# Patient Record
Sex: FEMALE | Race: WHITE | NOT HISPANIC OR LATINO
[De-identification: names, ages, dates, MRNs, and addresses within clinical notes are randomized per-mention and may not be internally consistent; named-entity substitution may affect disease eponyms.]

---

## 2017-02-22 ENCOUNTER — APPOINTMENT (OUTPATIENT)
Dept: ELECTROPHYSIOLOGY | Facility: CLINIC | Age: 82
End: 2017-02-22

## 2017-02-22 ENCOUNTER — NON-APPOINTMENT (OUTPATIENT)
Age: 82
End: 2017-02-22

## 2017-02-22 VITALS
WEIGHT: 120 LBS | SYSTOLIC BLOOD PRESSURE: 120 MMHG | HEIGHT: 61 IN | HEART RATE: 60 BPM | OXYGEN SATURATION: 96 % | DIASTOLIC BLOOD PRESSURE: 72 MMHG | BODY MASS INDEX: 22.66 KG/M2

## 2017-03-15 ENCOUNTER — APPOINTMENT (OUTPATIENT)
Dept: INTERNAL MEDICINE | Facility: CLINIC | Age: 82
End: 2017-03-15

## 2017-03-15 DIAGNOSIS — Z96.642 PRESENCE OF LEFT ARTIFICIAL HIP JOINT: ICD-10-CM

## 2017-04-04 ENCOUNTER — RX RENEWAL (OUTPATIENT)
Age: 82
End: 2017-04-04

## 2017-04-04 ENCOUNTER — TRANSCRIPTION ENCOUNTER (OUTPATIENT)
Age: 82
End: 2017-04-04

## 2017-04-28 ENCOUNTER — APPOINTMENT (OUTPATIENT)
Dept: INTERNAL MEDICINE | Facility: CLINIC | Age: 82
End: 2017-04-28

## 2017-04-28 VITALS — RESPIRATION RATE: 14 BRPM | HEART RATE: 68 BPM | SYSTOLIC BLOOD PRESSURE: 138 MMHG | DIASTOLIC BLOOD PRESSURE: 76 MMHG

## 2017-04-28 VITALS
HEART RATE: 63 BPM | WEIGHT: 124 LBS | BODY MASS INDEX: 23.41 KG/M2 | HEIGHT: 61 IN | DIASTOLIC BLOOD PRESSURE: 70 MMHG | SYSTOLIC BLOOD PRESSURE: 140 MMHG

## 2017-04-28 DIAGNOSIS — Z91.81 HISTORY OF FALLING: ICD-10-CM

## 2017-04-28 DIAGNOSIS — Z96.642 PRESENCE OF LEFT ARTIFICIAL HIP JOINT: ICD-10-CM

## 2017-04-28 LAB
ANION GAP SERPL CALC-SCNC: 16 MMOL/L
BUN SERPL-MCNC: 26 MG/DL
CALCIUM SERPL-MCNC: 9.8 MG/DL
CHLORIDE SERPL-SCNC: 104 MMOL/L
CO2 SERPL-SCNC: 24 MMOL/L
CREAT SERPL-MCNC: 0.99 MG/DL
GLUCOSE SERPL-MCNC: 89 MG/DL
POTASSIUM SERPL-SCNC: 4.8 MMOL/L
SODIUM SERPL-SCNC: 144 MMOL/L
T4 FREE SERPL-MCNC: 1.6 NG/DL
TSH SERPL-ACNC: 0.76 UIU/ML

## 2017-04-28 RX ORDER — CLINDAMYCIN HYDROCHLORIDE 300 MG/1
300 CAPSULE ORAL
Qty: 2 | Refills: 1 | Status: DISCONTINUED | COMMUNITY
Start: 2017-04-04 | End: 2017-04-28

## 2017-05-01 ENCOUNTER — MEDICATION RENEWAL (OUTPATIENT)
Age: 82
End: 2017-05-01

## 2017-05-01 LAB
CHOLEST SERPL-MCNC: 162 MG/DL
CHOLEST/HDLC SERPL: 3.8 RATIO
HDLC SERPL-MCNC: 43 MG/DL
LDLC SERPL CALC-MCNC: 73 MG/DL
TRIGL SERPL-MCNC: 231 MG/DL

## 2017-08-23 ENCOUNTER — NON-APPOINTMENT (OUTPATIENT)
Age: 82
End: 2017-08-23

## 2017-08-23 ENCOUNTER — APPOINTMENT (OUTPATIENT)
Dept: ELECTROPHYSIOLOGY | Facility: CLINIC | Age: 82
End: 2017-08-23
Payer: MEDICARE

## 2017-08-23 VITALS — DIASTOLIC BLOOD PRESSURE: 85 MMHG | SYSTOLIC BLOOD PRESSURE: 141 MMHG | HEART RATE: 58 BPM | OXYGEN SATURATION: 97 %

## 2017-08-23 PROCEDURE — 93000 ELECTROCARDIOGRAM COMPLETE: CPT

## 2017-08-23 PROCEDURE — 99214 OFFICE O/P EST MOD 30 MIN: CPT

## 2017-10-10 ENCOUNTER — TRANSCRIPTION ENCOUNTER (OUTPATIENT)
Age: 82
End: 2017-10-10

## 2017-10-10 ENCOUNTER — OTHER (OUTPATIENT)
Age: 82
End: 2017-10-10

## 2017-11-08 ENCOUNTER — APPOINTMENT (OUTPATIENT)
Dept: INTERNAL MEDICINE | Facility: CLINIC | Age: 82
End: 2017-11-08
Payer: MEDICARE

## 2017-11-08 VITALS
HEIGHT: 60.5 IN | WEIGHT: 124 LBS | BODY MASS INDEX: 23.72 KG/M2 | HEART RATE: 64 BPM | SYSTOLIC BLOOD PRESSURE: 140 MMHG | DIASTOLIC BLOOD PRESSURE: 60 MMHG

## 2017-11-08 PROCEDURE — 90688 IIV4 VACCINE SPLT 0.5 ML IM: CPT

## 2017-11-08 PROCEDURE — G0008: CPT

## 2017-11-08 PROCEDURE — 99214 OFFICE O/P EST MOD 30 MIN: CPT | Mod: 25

## 2018-02-28 ENCOUNTER — NON-APPOINTMENT (OUTPATIENT)
Age: 83
End: 2018-02-28

## 2018-02-28 ENCOUNTER — APPOINTMENT (OUTPATIENT)
Dept: ELECTROPHYSIOLOGY | Facility: CLINIC | Age: 83
End: 2018-02-28
Payer: MEDICARE

## 2018-02-28 VITALS
DIASTOLIC BLOOD PRESSURE: 83 MMHG | BODY MASS INDEX: 23.05 KG/M2 | OXYGEN SATURATION: 98 % | HEART RATE: 60 BPM | WEIGHT: 120 LBS | SYSTOLIC BLOOD PRESSURE: 156 MMHG

## 2018-02-28 PROCEDURE — 99214 OFFICE O/P EST MOD 30 MIN: CPT

## 2018-02-28 PROCEDURE — 93000 ELECTROCARDIOGRAM COMPLETE: CPT

## 2018-03-19 ENCOUNTER — TRANSCRIPTION ENCOUNTER (OUTPATIENT)
Age: 83
End: 2018-03-19

## 2018-04-18 ENCOUNTER — TRANSCRIPTION ENCOUNTER (OUTPATIENT)
Age: 83
End: 2018-04-18

## 2018-05-09 ENCOUNTER — APPOINTMENT (OUTPATIENT)
Dept: INTERNAL MEDICINE | Facility: CLINIC | Age: 83
End: 2018-05-09
Payer: MEDICARE

## 2018-05-09 VITALS
HEART RATE: 68 BPM | DIASTOLIC BLOOD PRESSURE: 70 MMHG | BODY MASS INDEX: 23.91 KG/M2 | SYSTOLIC BLOOD PRESSURE: 130 MMHG | WEIGHT: 125 LBS | HEIGHT: 60.5 IN

## 2018-05-09 PROCEDURE — 99214 OFFICE O/P EST MOD 30 MIN: CPT

## 2018-05-09 PROCEDURE — G0444 DEPRESSION SCREEN ANNUAL: CPT | Mod: 59

## 2018-05-10 LAB
ANION GAP SERPL CALC-SCNC: 13 MMOL/L
BUN SERPL-MCNC: 22 MG/DL
CALCIUM SERPL-MCNC: 9.7 MG/DL
CHLORIDE SERPL-SCNC: 105 MMOL/L
CHOLEST SERPL-MCNC: 158 MG/DL
CHOLEST/HDLC SERPL: 3.7 RATIO
CO2 SERPL-SCNC: 25 MMOL/L
CREAT SERPL-MCNC: 1.1 MG/DL
GLUCOSE SERPL-MCNC: 102 MG/DL
HDLC SERPL-MCNC: 43 MG/DL
LDLC SERPL CALC-MCNC: 72 MG/DL
POTASSIUM SERPL-SCNC: 5.3 MMOL/L
SODIUM SERPL-SCNC: 143 MMOL/L
TRIGL SERPL-MCNC: 216 MG/DL

## 2018-05-11 ENCOUNTER — MEDICATION RENEWAL (OUTPATIENT)
Age: 83
End: 2018-05-11

## 2018-05-11 LAB — TSH SERPL-ACNC: 0.77 UIU/ML

## 2018-05-14 ENCOUNTER — TRANSCRIPTION ENCOUNTER (OUTPATIENT)
Age: 83
End: 2018-05-14

## 2018-05-17 ENCOUNTER — APPOINTMENT (OUTPATIENT)
Dept: ALLERGY | Facility: CLINIC | Age: 83
End: 2018-05-17
Payer: MEDICARE

## 2018-05-17 VITALS
DIASTOLIC BLOOD PRESSURE: 80 MMHG | RESPIRATION RATE: 14 BRPM | HEIGHT: 61 IN | BODY MASS INDEX: 23.6 KG/M2 | SYSTOLIC BLOOD PRESSURE: 150 MMHG | HEART RATE: 64 BPM | WEIGHT: 125 LBS

## 2018-05-17 PROCEDURE — 99203 OFFICE O/P NEW LOW 30 MIN: CPT

## 2018-05-18 ENCOUNTER — TRANSCRIPTION ENCOUNTER (OUTPATIENT)
Age: 83
End: 2018-05-18

## 2018-06-13 ENCOUNTER — APPOINTMENT (OUTPATIENT)
Dept: ALLERGY | Facility: CLINIC | Age: 83
End: 2018-06-13
Payer: MEDICARE

## 2018-06-13 PROCEDURE — 95024 IQ TESTS W/ALLERGENIC XTRCS: CPT

## 2018-06-13 PROCEDURE — 95018 ALL TSTG PERQ&IQ DRUGS/BIOL: CPT

## 2018-06-14 ENCOUNTER — TRANSCRIPTION ENCOUNTER (OUTPATIENT)
Age: 83
End: 2018-06-14

## 2018-06-21 ENCOUNTER — APPOINTMENT (OUTPATIENT)
Dept: ALLERGY | Facility: CLINIC | Age: 83
End: 2018-06-21
Payer: MEDICARE

## 2018-06-21 PROCEDURE — 95076 INGEST CHALLENGE INI 120 MIN: CPT

## 2018-06-26 ENCOUNTER — TRANSCRIPTION ENCOUNTER (OUTPATIENT)
Age: 83
End: 2018-06-26

## 2018-06-28 ENCOUNTER — APPOINTMENT (OUTPATIENT)
Dept: ALLERGY | Facility: CLINIC | Age: 83
End: 2018-06-28
Payer: MEDICARE

## 2018-06-28 PROCEDURE — 95076 INGEST CHALLENGE INI 120 MIN: CPT

## 2018-06-28 PROCEDURE — 95018 ALL TSTG PERQ&IQ DRUGS/BIOL: CPT

## 2018-07-09 ENCOUNTER — TRANSCRIPTION ENCOUNTER (OUTPATIENT)
Age: 83
End: 2018-07-09

## 2018-08-29 ENCOUNTER — APPOINTMENT (OUTPATIENT)
Dept: ELECTROPHYSIOLOGY | Facility: CLINIC | Age: 83
End: 2018-08-29
Payer: MEDICARE

## 2018-08-29 ENCOUNTER — APPOINTMENT (OUTPATIENT)
Dept: ELECTROPHYSIOLOGY | Facility: CLINIC | Age: 83
End: 2018-08-29

## 2018-08-29 ENCOUNTER — NON-APPOINTMENT (OUTPATIENT)
Age: 83
End: 2018-08-29

## 2018-08-29 VITALS
SYSTOLIC BLOOD PRESSURE: 153 MMHG | HEIGHT: 61 IN | HEART RATE: 60 BPM | DIASTOLIC BLOOD PRESSURE: 73 MMHG | BODY MASS INDEX: 23.03 KG/M2 | WEIGHT: 122 LBS | OXYGEN SATURATION: 99 %

## 2018-08-29 PROCEDURE — 93000 ELECTROCARDIOGRAM COMPLETE: CPT

## 2018-08-29 PROCEDURE — 99214 OFFICE O/P EST MOD 30 MIN: CPT

## 2018-08-29 RX ORDER — CEFUROXIME SODIUM 750 MG/1
750 INJECTION, POWDER, FOR SOLUTION INTRAMUSCULAR; INTRAVENOUS
Qty: 1 | Refills: 0 | Status: DISCONTINUED | COMMUNITY
Start: 2018-05-31 | End: 2018-08-29

## 2018-08-29 RX ORDER — AMOXICILLIN 250 MG/5ML
250 POWDER, FOR SUSPENSION ORAL
Qty: 100 | Refills: 0 | Status: DISCONTINUED | COMMUNITY
Start: 2018-06-13 | End: 2018-08-29

## 2018-08-29 RX ORDER — CEFUROXIME SODIUM 1.5 G/16ML
1.5 INJECTION, POWDER, FOR SOLUTION INTRAVENOUS
Qty: 1 | Refills: 0 | Status: DISCONTINUED | COMMUNITY
Start: 2018-06-15 | End: 2018-08-29

## 2018-08-29 RX ORDER — CEPHALEXIN 250 MG/5ML
250 FOR SUSPENSION ORAL 4 TIMES DAILY
Qty: 1 | Refills: 0 | Status: DISCONTINUED | COMMUNITY
Start: 2018-06-25 | End: 2018-08-29

## 2018-10-10 ENCOUNTER — TRANSCRIPTION ENCOUNTER (OUTPATIENT)
Age: 83
End: 2018-10-10

## 2018-11-05 ENCOUNTER — TRANSCRIPTION ENCOUNTER (OUTPATIENT)
Age: 83
End: 2018-11-05

## 2018-11-14 ENCOUNTER — APPOINTMENT (OUTPATIENT)
Dept: INTERNAL MEDICINE | Facility: CLINIC | Age: 83
End: 2018-11-14
Payer: MEDICARE

## 2018-11-14 VITALS
HEART RATE: 68 BPM | BODY MASS INDEX: 23.41 KG/M2 | OXYGEN SATURATION: 98 % | SYSTOLIC BLOOD PRESSURE: 160 MMHG | HEIGHT: 61 IN | DIASTOLIC BLOOD PRESSURE: 70 MMHG | WEIGHT: 124 LBS

## 2018-11-14 VITALS — SYSTOLIC BLOOD PRESSURE: 148 MMHG | HEART RATE: 64 BPM | DIASTOLIC BLOOD PRESSURE: 72 MMHG | RESPIRATION RATE: 12 BRPM

## 2018-11-14 DIAGNOSIS — Z91.89 OTHER SPECIFIED PERSONAL RISK FACTORS, NOT ELSEWHERE CLASSIFIED: ICD-10-CM

## 2018-11-14 PROCEDURE — 90662 IIV NO PRSV INCREASED AG IM: CPT

## 2018-11-14 PROCEDURE — G0439: CPT

## 2018-11-14 PROCEDURE — G0008: CPT

## 2018-11-14 NOTE — PHYSICAL EXAM

## 2018-11-14 NOTE — ASSESSMENT
[FreeTextEntry1] : 1. HBP- Echo done in past- no LVH. Known white coat element- 24 hour ambu BP done April 2014- overall BP normal with only 14-17% systolic readings above 140 with highest 153. Repeated May 2016 with essentially optimal BP control- avg 118/65 with only 1/44 systolic readings above 140.. \par  \par  2. Hyperlipidemia- last LDL 72 in April 2017 on crestor 5- understands lack of data in age group but comfortable remaining on\par  \par  3. Hypothyroid- clin euthyroid- normal TSH in May 2018\par  \par  4. Arrhythmia- doing well on Sotalol under direction Dr. Naranjo-- f/u 6 mos- last 8/18- echo done last 11/15 and essentially normal\par  \par  5. h/o Ulcerative Colitis- rare occas exacerbations- colon last in Oct 2015 and neg- bx without dysplasia or significant inflammation- Dr Beltran- now with 6 mos of occasional blood- advised to f/u with GI- ? anosxopy or flex sig- to make sure bleeding is not related to colitis- sx somewhat atypical for hemorroids\par  \par  6. HCM- mammo 1/14- neg- pros and cons discussed- has decided to defer\par  colon 7/12, 10/15- neg\par  immun- ok- shingrix discussed\par  s/p hysterectomy\par  HC Proxy- - Vladimir and son Boris\par  Depression screening negative \par  \par  7. Osteoporosis- took alendronate from 6/98- 2/10=- On drug holiday- last DEXA 1/15 stable compared 2007/ 2010/2012- see notes- repeat at some point\par \par  8. Fall 9/16- first time- no injury- seemingly stumbled/misstepped- precautions advised and revised. ONe fall summer 2018- FALL PREVENTION REVIEWED AT LENGTH\par \par - f/u 6 mos or prn- flu shot and referral to see Dr Beltran

## 2018-11-14 NOTE — HEALTH RISK ASSESSMENT
[Very Good] : ~his/her~  mood as very good [One fall no injury in past year] : Patient reported one fall in the past year without injury [0] : 2) Feeling down, depressed, or hopeless: Not at all (0) [HIV test declined] : HIV test declined [Hepatitis C test declined] : Hepatitis C test declined [None] : None [With Family] : lives with family [Retired] : retired [] :  [# Of Children ___] : has [unfilled] children [Feels Safe at Home] : Feels safe at home [Fully functional (bathing, dressing, toileting, transferring, walking, feeding)] : Fully functional (bathing, dressing, toileting, transferring, walking, feeding) [Fully functional (using the telephone, shopping, preparing meals, housekeeping, doing laundry, using] : Fully functional and needs no help or supervision to perform IADLs (using the telephone, shopping, preparing meals, housekeeping, doing laundry, using transportation, managing medications and managing finances) [Smoke Detector] : smoke detector [Carbon Monoxide Detector] : carbon monoxide detector [Seat Belt] :  uses seat belt [Discussed at today's visit] : Advance Directives Discussed at today's visit [Designated Healthcare Proxy] : Designated healthcare proxy [FreeTextEntry1] : bleeding hemorrhoids [] : No [de-identified] : No ED or hosp [de-identified] : Cardiology; Aergy & I ; optho [de-identified] : fell summer 2018- tripped on uneven sidewalk- fell on grass [[id number]] : 0 [Change in mental status noted] : No change in mental status noted [Language] : denies difficulty with language [Behavior] : denies difficulty with behavior [Learning/Retaining New Information] : denies difficulty learning/retaining new information [Handling Complex Tasks] : denies difficulty handling complex tasks [Reasoning] : denies difficulty with reasoning [Spatial Ability and Orientation] : denies difficulty with spatial ability and orientation [Reports changes in hearing] : Reports no changes in hearing [Reports changes in vision] : Reports no changes in vision [Reports changes in dental health] : Reports no changes in dental health [de-identified] : glasses [FreeTextEntry4] : Vladimir comer

## 2018-11-14 NOTE — HISTORY OF PRESENT ILLNESS
[FreeTextEntry1] : 85 yo for scheduled annual wellness and f/u for hypertension, hypothyroid, drug allergies, cardiac arrhythmia [de-identified] : Last seen in May with communication since. Went ahead with drug allergy testing and underwent challenged with amoxacilllin  and cephalosporin without incident No longer considered Penicillin- allergy\par \par Patient has been generally well without any significant intercurrent issues or problems. Adherent with medications as noted without side effects. Appetite good and weight reportedly stable. Active with good exercise tolerance. No sx of chest pain, sob, palpitations, orthopnea, PND, HARDY, edema, lightheadedness..\par \par Exercises daily- calisthenics/stretching and walking Good tolerance. Feels some limitation of motion since hip surgery- i.e getting up from floor if goes down to mop floor NO pain

## 2018-11-15 ENCOUNTER — TRANSCRIPTION ENCOUNTER (OUTPATIENT)
Age: 83
End: 2018-11-15

## 2018-11-26 ENCOUNTER — APPOINTMENT (OUTPATIENT)
Dept: GASTROENTEROLOGY | Facility: CLINIC | Age: 83
End: 2018-11-26
Payer: MEDICARE

## 2018-11-26 VITALS
DIASTOLIC BLOOD PRESSURE: 78 MMHG | WEIGHT: 123 LBS | HEART RATE: 74 BPM | OXYGEN SATURATION: 98 % | SYSTOLIC BLOOD PRESSURE: 118 MMHG | BODY MASS INDEX: 23.22 KG/M2 | HEIGHT: 61 IN

## 2018-11-26 PROCEDURE — 99203 OFFICE O/P NEW LOW 30 MIN: CPT

## 2018-11-28 ENCOUNTER — TRANSCRIPTION ENCOUNTER (OUTPATIENT)
Age: 83
End: 2018-11-28

## 2019-01-04 ENCOUNTER — APPOINTMENT (OUTPATIENT)
Dept: ORTHOPEDIC SURGERY | Facility: CLINIC | Age: 84
End: 2019-01-04
Payer: MEDICARE

## 2019-01-04 VITALS
SYSTOLIC BLOOD PRESSURE: 125 MMHG | WEIGHT: 123 LBS | HEIGHT: 61 IN | HEART RATE: 61 BPM | BODY MASS INDEX: 23.22 KG/M2 | DIASTOLIC BLOOD PRESSURE: 78 MMHG

## 2019-01-04 PROCEDURE — 73521 X-RAY EXAM HIPS BI 2 VIEWS: CPT

## 2019-01-04 PROCEDURE — 99213 OFFICE O/P EST LOW 20 MIN: CPT

## 2019-01-04 PROCEDURE — 72100 X-RAY EXAM L-S SPINE 2/3 VWS: CPT

## 2019-01-08 ENCOUNTER — TRANSCRIPTION ENCOUNTER (OUTPATIENT)
Age: 84
End: 2019-01-08

## 2019-01-11 ENCOUNTER — TRANSCRIPTION ENCOUNTER (OUTPATIENT)
Age: 84
End: 2019-01-11

## 2019-02-13 ENCOUNTER — APPOINTMENT (OUTPATIENT)
Dept: ELECTROPHYSIOLOGY | Facility: CLINIC | Age: 84
End: 2019-02-13
Payer: MEDICARE

## 2019-02-13 ENCOUNTER — NON-APPOINTMENT (OUTPATIENT)
Age: 84
End: 2019-02-13

## 2019-02-13 VITALS
HEIGHT: 60 IN | OXYGEN SATURATION: 95 % | WEIGHT: 121 LBS | BODY MASS INDEX: 23.75 KG/M2 | DIASTOLIC BLOOD PRESSURE: 83 MMHG | SYSTOLIC BLOOD PRESSURE: 175 MMHG | HEART RATE: 62 BPM

## 2019-02-13 PROCEDURE — 99214 OFFICE O/P EST MOD 30 MIN: CPT | Mod: 25

## 2019-02-13 PROCEDURE — 93000 ELECTROCARDIOGRAM COMPLETE: CPT

## 2019-02-25 ENCOUNTER — TRANSCRIPTION ENCOUNTER (OUTPATIENT)
Age: 84
End: 2019-02-25

## 2019-02-27 ENCOUNTER — APPOINTMENT (OUTPATIENT)
Dept: ELECTROPHYSIOLOGY | Facility: CLINIC | Age: 84
End: 2019-02-27

## 2019-03-05 ENCOUNTER — TRANSCRIPTION ENCOUNTER (OUTPATIENT)
Age: 84
End: 2019-03-05

## 2019-03-24 NOTE — HISTORY OF PRESENT ILLNESS
[FreeTextEntry1] : 83 yo woman with prior history of SVT/AT maintained on Sotalol 40 mg bid. She has history of HTN - on Lorsartan. History of Hypothyroidism on Levoxyl and HLD - Rosuvastatin.\par She is feeling well and has no new symptoms.  \par Denies palpitations, SOB, chest pain, dizziness, lightheadedness, syncope or presyncope.  No bleeding problems ( h/o hemorroids)\par Prior echocardiogram showed preserved left ventricular function.  Prior nuclear stress test 2010 did not show any ischemia. She has not had any ischemic symptoms.

## 2019-03-24 NOTE — DISCUSSION/SUMMARY
[FreeTextEntry1] : No SVT recurrence.\par She is on sotalol and QT interval nl for rate. Recommend follow up electrolytes and  renal function\par Bradycardia: baseline. No progression. \par  HTN - Elevated BPs on current dosage of Lorsartan. Followup eval.  \par  Followup with electrophysiology in 6 months.

## 2019-03-24 NOTE — REVIEW OF SYSTEMS
[Negative] : Heme/Lymph [Feeling Fatigued] : not feeling fatigued [Shortness Of Breath] : no shortness of breath [Cough] : no cough [Joint Pain] : no joint pain [Dizziness] : no dizziness

## 2019-03-24 NOTE — REASON FOR VISIT
[Palpitations] : palpitations [Supraventricular Tachycardia] : supraventricular tachycardia [Spouse] : spouse [FreeTextEntry2] : follow up

## 2019-03-24 NOTE — PHYSICAL EXAM
[General Appearance - Well Developed] : well developed [General Appearance - In No Acute Distress] : no acute distress [Normal Conjunctiva] : the conjunctiva exhibited no abnormalities [No Oral Pallor] : no oral pallor [No Oral Cyanosis] : no oral cyanosis [Respiration, Rhythm And Depth] : normal respiratory rhythm and effort [Auscultation Breath Sounds / Voice Sounds] : lungs were clear to auscultation bilaterally [Abdomen Soft] : soft [Abdomen Tenderness] : non-tender [Abnormal Walk] : normal gait [Nail Clubbing] : no clubbing of the fingernails [Cyanosis, Localized] : no localized cyanosis [] : no rash [No Venous Stasis] : no venous stasis [Impaired Insight] : insight and judgment were intact [5th Left ICS - MCL] : palpated at the 5th LICS in the midclavicular line [Bradycardia] : bradycardic [Rhythm Regular] : regular [Normal S1] : normal S1 [Normal S2] : normal S2 [2+] : left 2+ [1+] : left 1+ [No Abnormalities] : the abdominal aorta was not enlarged and no bruit was heard [No Pitting Edema] : no pitting edema present [FreeTextEntry1] : no JVD or carotid bruit [S3] : no S3 [S4] : no S4

## 2019-04-25 ENCOUNTER — NON-APPOINTMENT (OUTPATIENT)
Age: 84
End: 2019-04-25

## 2019-04-25 ENCOUNTER — TRANSCRIPTION ENCOUNTER (OUTPATIENT)
Age: 84
End: 2019-04-25

## 2019-04-25 ENCOUNTER — APPOINTMENT (OUTPATIENT)
Dept: INTERNAL MEDICINE | Facility: CLINIC | Age: 84
End: 2019-04-25
Payer: MEDICARE

## 2019-04-25 VITALS
BODY MASS INDEX: 25.13 KG/M2 | WEIGHT: 128 LBS | HEIGHT: 60 IN | DIASTOLIC BLOOD PRESSURE: 70 MMHG | HEART RATE: 66 BPM | SYSTOLIC BLOOD PRESSURE: 170 MMHG | OXYGEN SATURATION: 97 %

## 2019-04-25 VITALS — DIASTOLIC BLOOD PRESSURE: 80 MMHG | SYSTOLIC BLOOD PRESSURE: 170 MMHG

## 2019-04-25 DIAGNOSIS — Z00.00 ENCOUNTER FOR GENERAL ADULT MEDICAL EXAMINATION W/OUT ABNORMAL FINDINGS: ICD-10-CM

## 2019-04-25 PROCEDURE — 99215 OFFICE O/P EST HI 40 MIN: CPT

## 2019-04-26 LAB
25(OH)D3 SERPL-MCNC: 50 NG/ML
ALBUMIN SERPL ELPH-MCNC: 4.5 G/DL
ALP BLD-CCNC: 60 U/L
ALT SERPL-CCNC: 19 U/L
AMYLASE/CREAT SERPL: 174 U/L
ANION GAP SERPL CALC-SCNC: 15 MMOL/L
AST SERPL-CCNC: 27 U/L
BACTERIA UR CULT: NORMAL
BASOPHILS # BLD AUTO: 0.03 K/UL
BASOPHILS NFR BLD AUTO: 0.6 %
BILIRUB SERPL-MCNC: 0.6 MG/DL
BILIRUB UR QL STRIP: NORMAL
BUN SERPL-MCNC: 21 MG/DL
CALCIUM SERPL-MCNC: 9.4 MG/DL
CHLORIDE SERPL-SCNC: 106 MMOL/L
CHOLEST SERPL-MCNC: 173 MG/DL
CHOLEST/HDLC SERPL: 3.7 RATIO
CLARITY UR: CLEAR
CO2 SERPL-SCNC: 21 MMOL/L
COLLECTION METHOD: NORMAL
CREAT SERPL-MCNC: 0.83 MG/DL
EOSINOPHIL # BLD AUTO: 0.09 K/UL
EOSINOPHIL NFR BLD AUTO: 1.7 %
ESTIMATED AVERAGE GLUCOSE: 117 MG/DL
GLUCOSE SERPL-MCNC: 78 MG/DL
GLUCOSE UR-MCNC: NORMAL
HBA1C MFR BLD HPLC: 5.7 %
HCG UR QL: 0.2 EU/DL
HCT VFR BLD CALC: 41.8 %
HDLC SERPL-MCNC: 47 MG/DL
HGB BLD-MCNC: 13.1 G/DL
HGB UR QL STRIP.AUTO: NORMAL
IMM GRANULOCYTES NFR BLD AUTO: 0.2 %
KETONES UR-MCNC: NORMAL
LDLC SERPL CALC-MCNC: 95 MG/DL
LEUKOCYTE ESTERASE UR QL STRIP: NORMAL
LPL SERPL-CCNC: 46 U/L
LYMPHOCYTES # BLD AUTO: 2.1 K/UL
LYMPHOCYTES NFR BLD AUTO: 40.8 %
MAN DIFF?: NORMAL
MCHC RBC-ENTMCNC: 28.8 PG
MCHC RBC-ENTMCNC: 31.3 GM/DL
MCV RBC AUTO: 91.9 FL
MONOCYTES # BLD AUTO: 0.45 K/UL
MONOCYTES NFR BLD AUTO: 8.7 %
NEUTROPHILS # BLD AUTO: 2.47 K/UL
NEUTROPHILS NFR BLD AUTO: 48 %
NITRITE UR QL STRIP: NORMAL
PH UR STRIP: 7
PLATELET # BLD AUTO: 232 K/UL
POTASSIUM SERPL-SCNC: 4.5 MMOL/L
PROT SERPL-MCNC: 7.2 G/DL
PROT UR STRIP-MCNC: NORMAL
RBC # BLD: 4.55 M/UL
RBC # FLD: 14.3 %
SODIUM SERPL-SCNC: 142 MMOL/L
SP GR UR STRIP: 1.01
TRIGL SERPL-MCNC: 155 MG/DL
TSH SERPL-ACNC: 1.48 UIU/ML
VIT B12 SERPL-MCNC: >2000 PG/ML
WBC # FLD AUTO: 5.15 K/UL

## 2019-04-26 NOTE — REVIEW OF SYSTEMS
[Vision Problems] : vision problems [Hearing Loss] : hearing loss [Sore Throat] : sore throat [Negative] : Psychiatric [Chills] : no chills [Fever] : no fever [Nasal Discharge] : no nasal discharge [Night Sweats] : no night sweats

## 2019-04-26 NOTE — PHYSICAL EXAM
[No Acute Distress] : no acute distress [Well Developed] : well developed [Well Nourished] : well nourished [Well-Appearing] : well-appearing [Normal Sclera/Conjunctiva] : normal sclera/conjunctiva [EOMI] : extraocular movements intact [Normal Outer Ear/Nose] : the outer ears and nose were normal in appearance [PERRL] : pupils equal round and reactive to light [Normal Oropharynx] : the oropharynx was normal [No JVD] : no jugular venous distention [Supple] : supple [No Respiratory Distress] : no respiratory distress  [Thyroid Normal, No Nodules] : the thyroid was normal and there were no nodules present [No Lymphadenopathy] : no lymphadenopathy [No Accessory Muscle Use] : no accessory muscle use [Clear to Auscultation] : lungs were clear to auscultation bilaterally [Normal Rate] : normal rate  [Regular Rhythm] : with a regular rhythm [Normal S1, S2] : normal S1 and S2 [No Murmur] : no murmur heard [No Carotid Bruits] : no carotid bruits [No Abdominal Bruit] : a ~M bruit was not heard ~T in the abdomen [No Varicosities] : no varicosities [Pedal Pulses Present] : the pedal pulses are present [No Edema] : there was no peripheral edema [No Extremity Clubbing/Cyanosis] : no extremity clubbing/cyanosis [No Palpable Aorta] : no palpable aorta [Soft] : abdomen soft [Non Tender] : non-tender [Non-distended] : non-distended [No Masses] : no abdominal mass palpated [No HSM] : no HSM [Normal Posterior Cervical Nodes] : no posterior cervical lymphadenopathy [Normal Bowel Sounds] : normal bowel sounds [Normal Anterior Cervical Nodes] : no anterior cervical lymphadenopathy [No CVA Tenderness] : no CVA  tenderness [No Joint Swelling] : no joint swelling [No Spinal Tenderness] : no spinal tenderness [Grossly Normal Strength/Tone] : grossly normal strength/tone [No Rash] : no rash [Normal Gait] : normal gait [Coordination Grossly Intact] : coordination grossly intact [Deep Tendon Reflexes (DTR)] : deep tendon reflexes were 2+ and symmetric [No Focal Deficits] : no focal deficits [Normal Affect] : the affect was normal [Normal Insight/Judgement] : insight and judgment were intact [Speech Grossly Normal] : speech grossly normal [de-identified] : conjunctiva pink [de-identified] : no bruit [de-identified] : Romberg negative, no pronator drift, finger to nose pointing- normal

## 2019-04-26 NOTE — HISTORY OF PRESENT ILLNESS
[FreeTextEntry8] : Seen today after patient called me yesterday night when on call. She is 85 yo female with hx SVT, controlled on Sotalol, followed by Dr Naarnjo. Had experienced two episodes of feeling "dizzy and not herself" in the past 24 hours, first episode was brief in the morning, only few minutes, second episode occurring in evening, after completing her one hour floor routine which she did without any problems, lasting for few hours, improved  by this morning but not yet herself. She thought she might have SVT and questioning need to increase Sotalol.  She denies chest pain, palpitations, dyspnea, headache, change of vision or speech or sensation of room spinning. She had no problems with her speech, weakness, or gait. She denies any URI symptoms, N/V or diarrhea, but did have on the prior evening,  episode of intense  abdominal cramps in suprapubic area, lasting few hours, and  but no associated diarrhea, constipation. Every day, she has 2 coffees, 1 tea, and 1 glass of water.\par She lives with her 90 yo  who is able to drive her.

## 2019-05-22 ENCOUNTER — APPOINTMENT (OUTPATIENT)
Dept: INTERNAL MEDICINE | Facility: CLINIC | Age: 84
End: 2019-05-22
Payer: MEDICARE

## 2019-05-22 VITALS — DIASTOLIC BLOOD PRESSURE: 70 MMHG | RESPIRATION RATE: 14 BRPM | SYSTOLIC BLOOD PRESSURE: 160 MMHG | HEART RATE: 70 BPM

## 2019-05-22 VITALS — BODY MASS INDEX: 25.13 KG/M2 | HEIGHT: 60 IN | WEIGHT: 128 LBS

## 2019-05-22 DIAGNOSIS — R10.9 UNSPECIFIED ABDOMINAL PAIN: ICD-10-CM

## 2019-05-22 DIAGNOSIS — K64.9 UNSPECIFIED HEMORRHOIDS: ICD-10-CM

## 2019-05-22 DIAGNOSIS — Z87.898 PERSONAL HISTORY OF OTHER SPECIFIED CONDITIONS: ICD-10-CM

## 2019-05-22 DIAGNOSIS — Z88.1 ALLERGY STATUS TO OTHER ANTIBIOTIC AGENTS: ICD-10-CM

## 2019-05-22 DIAGNOSIS — Z87.39 PERSONAL HISTORY OF OTHER DISEASES OF THE MUSCULOSKELETAL SYSTEM AND CONNECTIVE TISSUE: ICD-10-CM

## 2019-05-22 DIAGNOSIS — M25.551 PAIN IN RIGHT HIP: ICD-10-CM

## 2019-05-22 PROCEDURE — 99214 OFFICE O/P EST MOD 30 MIN: CPT

## 2019-05-22 NOTE — ASSESSMENT
[FreeTextEntry1] : 1. HBP- Echo done in past- no LVH. Known white coat element- 24 hour ambu BP done April 2014- overall BP normal with only 14-17% systolic readings above 140 with highest 153. Repeated May 2016 with essentially optimal BP control- avg 118/65 with only 1/44 systolic readings above 140.. BP elevated to 170 when seen last month and not feeling well. Dr Mark increased dose of losartan to 50 mg twice daily\par \par BP ok today, but could be white coat- and component of pseudohypertension- options reviewed- could do another 24 hr ambu bp- recently piurchased home cuff- will try to verify accuracy with MD son and then check BP  several times a week- to report on readings at some point- unless all high\par  \par  2. Hyperlipidemia- last LDL 95 in April 2019 on crestor 5- understands lack of data in age group but comfortable remaining on\par  \par  3. Hypothyroid- clin euthyroid- normal TSH in April 2019\par  \par  4. Arrhythmia- doing well on Sotalol under direction Dr. Naranjo-- f/u 6 mos- last 2/19- echo done last 11/15 and essentially normal\par  \par  5. h/o Ulcerative Colitis- rare occas exacerbations- colon last in Oct 2015 and neg- bx without dysplasia or significant inflammation- Dr Beltran-  with 6 mos of occasional blood reported 11/18- Saw Dr Beltran- felt to be hemorrhoidal\par  \par  6. HCM- mammo 1/14- neg- pros and cons discussed- has decided to defer\par  colon 7/12, 10/15- neg\par  immun- ok- shingrix discussed and encouraged\par  s/p hysterectomy\par  HC Proxy- - Vladimir and son Boris\par  Depression screening negative \par  \par  7. Osteoporosis- took alendronate from 6/98- 2/10=- On drug holiday- last DEXA 1/15 stable compared 2007/ 2010/2012- see notes- repeat at some point\par \par 8. Episode of dysequilibrium- April 2019- unclear etioilgy- resolved and eval neg- ? viral syndrone\par \par LAbs in APril\par

## 2019-05-22 NOTE — HEALTH RISK ASSESSMENT
[No falls in past year] : Patient reported no falls in the past year [0] : 2) Feeling down, depressed, or hopeless: Not at all (0) [] : No [de-identified] : No ED or Hosp [de-identified] : EP, Ortho, GI, Optho [de-identified] : ,  [de-identified] : Rare social [de-identified] : Extremely active around house and specific exercises [de-identified] : Healthy diet- tries to avoid fats Fruits and vegetables [[id number]] : 0

## 2019-05-22 NOTE — HISTORY OF PRESENT ILLNESS
[FreeTextEntry1] : 84+ yo for scheduled f/u for history of SVT, IBD, thyroid disease,  and routine care [de-identified] : Last seen by me in Nov. Saw Dr Varghese- hemorrhoids- bleeding- better. ROutine f/u with EP and Ortho. Had acute illness about one month ago- Reports an imbalance when walking- had similar sx when had arrhtymia- see notes. Exam and labs were all fine. Sx resolved completely. Only had sx when walking- in am and then resolved. No other neuro sx NO assoc nausea.\par \par Patient has been generally well since. Adherent with medications as noted without side effects. Appetite good and weight reportedly stable. Active with good exercise tolerance. No sx of chest pain, sob, palpitations, orthopnea, PND, HARDY, edema, lightheadedness..\par \par \par Walking and stretching, isometrics- course of PT and doing exercises at home

## 2019-08-19 ENCOUNTER — TRANSCRIPTION ENCOUNTER (OUTPATIENT)
Age: 84
End: 2019-08-19

## 2019-08-28 ENCOUNTER — APPOINTMENT (OUTPATIENT)
Dept: ELECTROPHYSIOLOGY | Facility: CLINIC | Age: 84
End: 2019-08-28
Payer: MEDICARE

## 2019-08-28 ENCOUNTER — NON-APPOINTMENT (OUTPATIENT)
Age: 84
End: 2019-08-28

## 2019-08-28 VITALS
OXYGEN SATURATION: 97 % | DIASTOLIC BLOOD PRESSURE: 88 MMHG | SYSTOLIC BLOOD PRESSURE: 149 MMHG | HEIGHT: 60 IN | HEART RATE: 63 BPM

## 2019-08-28 PROCEDURE — 99214 OFFICE O/P EST MOD 30 MIN: CPT

## 2019-08-28 PROCEDURE — 93000 ELECTROCARDIOGRAM COMPLETE: CPT

## 2019-08-28 NOTE — PHYSICAL EXAM
[General Appearance - Well Developed] : well developed [Normal Conjunctiva] : the conjunctiva exhibited no abnormalities [General Appearance - In No Acute Distress] : no acute distress [No Oral Pallor] : no oral pallor [No Oral Cyanosis] : no oral cyanosis [Respiration, Rhythm And Depth] : normal respiratory rhythm and effort [Auscultation Breath Sounds / Voice Sounds] : lungs were clear to auscultation bilaterally [Abdomen Soft] : soft [Abdomen Tenderness] : non-tender [Abnormal Walk] : normal gait [Nail Clubbing] : no clubbing of the fingernails [Cyanosis, Localized] : no localized cyanosis [] : no rash [No Venous Stasis] : no venous stasis [Impaired Insight] : insight and judgment were intact [5th Left ICS - MCL] : palpated at the 5th LICS in the midclavicular line [Bradycardia] : bradycardic [Rhythm Regular] : regular [Normal S1] : normal S1 [Normal S2] : normal S2 [2+] : left 2+ [1+] : left 1+ [No Abnormalities] : the abdominal aorta was not enlarged and no bruit was heard [No Pitting Edema] : no pitting edema present [FreeTextEntry1] : no JVD or carotid bruit [S3] : no S3 [S4] : no S4

## 2019-08-28 NOTE — HISTORY OF PRESENT ILLNESS
[FreeTextEntry1] : 84 yo woman with prior history of SVT/AT maintained on Sotalol 40 mg bid. She has history of HTN - on Lorsartan. History of Hypothyroidism on Levoxyl and HLD - Rosuvastatin.\par No new symptoms.  \par Denies palpitations, SOB, chest pain, dizziness, lightheadedness, syncope or presyncope.  No bleeding problems ( h/o hemorroids)\par Prior echocardiogram 2015showed preserved left ventricular function.  Prior nuclear stress test 2010 did not show any ischemia. She has not had any ischemic symptoms.

## 2019-08-28 NOTE — DISCUSSION/SUMMARY
[FreeTextEntry1] : She is doing well and no SVT recurrence.\par She is on sotalol and QT interval nl for rate. Recommend follow up electrolytes and  renal function\par Bradycardia: baseline. No progression. \par  Followup with electrophysiology in 6 months.

## 2019-10-29 ENCOUNTER — TRANSCRIPTION ENCOUNTER (OUTPATIENT)
Age: 84
End: 2019-10-29

## 2019-10-30 ENCOUNTER — APPOINTMENT (OUTPATIENT)
Dept: GASTROENTEROLOGY | Facility: CLINIC | Age: 84
End: 2019-10-30
Payer: MEDICARE

## 2019-10-30 PROCEDURE — 99214 OFFICE O/P EST MOD 30 MIN: CPT

## 2019-10-30 NOTE — PHYSICAL EXAM
[General Appearance - Alert] : alert [General Appearance - In No Acute Distress] : in no acute distress [Outer Ear] : the ears and nose were normal in appearance [Auscultation Breath Sounds / Voice Sounds] : lungs were clear to auscultation bilaterally [Heart Rate And Rhythm] : heart rate was normal and rhythm regular [Heart Sounds] : normal S1 and S2 [Heart Sounds Gallop] : no gallops [Murmurs] : no murmurs [Heart Sounds Pericardial Friction Rub] : no pericardial rub [Edema] : there was no peripheral edema [Bowel Sounds] : normal bowel sounds [Abdomen Soft] : soft [Abdomen Tenderness] : non-tender [] : no hepato-splenomegaly [Abdomen Mass (___ Cm)] : no abdominal mass palpated [Normal Sphincter Tone] : normal sphincter tone [No Rectal Mass] : no rectal mass [External Hemorrhoid] : external hemorrhoids [FreeTextEntry1] : brown stool in vault, no blood [Skin Color & Pigmentation] : normal skin color and pigmentation

## 2019-10-30 NOTE — HISTORY OF PRESENT ILLNESS
[FreeTextEntry1] : Yazmin Presents for a followup visit. She relates a few weeks of lower, cramping followed by diarrheal stools and gas and bloating. Sometimes she has incontinence. Sometimes she sees small amount of dark red blood. No fevers or chills. No weight loss. She admits to having dairy products.

## 2019-10-30 NOTE — ASSESSMENT
[FreeTextEntry1] : This is an 85-year-old female with ulcerative colitis reporting chronic lower abdominal cramping, fecal incontinence and diarrhea. Diarrhea occurs possibly 4 times a week consisting of several stools a day when she developed cramping. I recommend stool studies to rule out infectious process. I recommend fecal calprotection and lactoferrin.Recommend blood work including CBC, sedimentation rate and C-reactive protein. I recommend to a trial on a lactose-free diet. If the stool studies and blood work are unremarkable and she continues to have cramping with diarrhea after 2 weeks on lactose-free diet, she will need to undergo a colonoscopy. At this time she is reluctant to do this.Questions were answered. She stated understanding.

## 2019-10-30 NOTE — REASON FOR VISIT
[Follow-Up: _____] : a [unfilled] follow-up visit [FreeTextEntry1] : lower abdominal cramps, diarrhea, bloating

## 2019-10-31 ENCOUNTER — TRANSCRIPTION ENCOUNTER (OUTPATIENT)
Age: 84
End: 2019-10-31

## 2019-10-31 LAB
ALBUMIN SERPL ELPH-MCNC: 4.4 G/DL
ALP BLD-CCNC: 56 U/L
ALT SERPL-CCNC: 18 U/L
ANION GAP SERPL CALC-SCNC: 13 MMOL/L
AST SERPL-CCNC: 23 U/L
BASOPHILS # BLD AUTO: 0.04 K/UL
BASOPHILS NFR BLD AUTO: 0.7 %
BILIRUB SERPL-MCNC: 0.3 MG/DL
BUN SERPL-MCNC: 25 MG/DL
CALCIUM SERPL-MCNC: 9.4 MG/DL
CHLORIDE SERPL-SCNC: 107 MMOL/L
CO2 SERPL-SCNC: 23 MMOL/L
CREAT SERPL-MCNC: 0.9 MG/DL
CRP SERPL-MCNC: 0.57 MG/DL
EOSINOPHIL # BLD AUTO: 0.13 K/UL
EOSINOPHIL NFR BLD AUTO: 2.2 %
ERYTHROCYTE [SEDIMENTATION RATE] IN BLOOD BY WESTERGREN METHOD: 25 MM/HR
GLUCOSE SERPL-MCNC: 110 MG/DL
HCT VFR BLD CALC: 38 %
HGB BLD-MCNC: 11.9 G/DL
IMM GRANULOCYTES NFR BLD AUTO: 0.3 %
LYMPHOCYTES # BLD AUTO: 2.01 K/UL
LYMPHOCYTES NFR BLD AUTO: 34.7 %
MAN DIFF?: NORMAL
MCHC RBC-ENTMCNC: 29 PG
MCHC RBC-ENTMCNC: 31.3 GM/DL
MCV RBC AUTO: 92.7 FL
MONOCYTES # BLD AUTO: 0.66 K/UL
MONOCYTES NFR BLD AUTO: 11.4 %
NEUTROPHILS # BLD AUTO: 2.94 K/UL
NEUTROPHILS NFR BLD AUTO: 50.7 %
PLATELET # BLD AUTO: 273 K/UL
POTASSIUM SERPL-SCNC: 4.6 MMOL/L
PROT SERPL-MCNC: 6.7 G/DL
RBC # BLD: 4.1 M/UL
RBC # FLD: 14.6 %
SODIUM SERPL-SCNC: 143 MMOL/L
WBC # FLD AUTO: 5.8 K/UL

## 2019-11-01 LAB
C DIFF TOX GENS STL QL NAA+PROBE: NORMAL
CDIFF BY PCR: NOT DETECTED
GI PCR PANEL, STOOL: NORMAL

## 2019-11-05 LAB — CALPROTECTIN FECAL: 171 UG/G

## 2019-11-07 LAB — LACTOFERRIN STL-MCNC: 44.8

## 2019-11-12 ENCOUNTER — TRANSCRIPTION ENCOUNTER (OUTPATIENT)
Age: 84
End: 2019-11-12

## 2019-11-17 ENCOUNTER — MOBILE ON CALL (OUTPATIENT)
Age: 84
End: 2019-11-17

## 2019-11-18 ENCOUNTER — MESSAGE (OUTPATIENT)
Age: 84
End: 2019-11-18

## 2019-12-04 ENCOUNTER — APPOINTMENT (OUTPATIENT)
Dept: INTERNAL MEDICINE | Facility: CLINIC | Age: 84
End: 2019-12-04
Payer: MEDICARE

## 2019-12-04 VITALS
BODY MASS INDEX: 23.95 KG/M2 | OXYGEN SATURATION: 97 % | DIASTOLIC BLOOD PRESSURE: 70 MMHG | HEART RATE: 62 BPM | HEIGHT: 60 IN | WEIGHT: 122 LBS | SYSTOLIC BLOOD PRESSURE: 150 MMHG

## 2019-12-04 DIAGNOSIS — E03.9 HYPOTHYROIDISM, UNSPECIFIED: ICD-10-CM

## 2019-12-04 DIAGNOSIS — R10.30 LOWER ABDOMINAL PAIN, UNSPECIFIED: ICD-10-CM

## 2019-12-04 DIAGNOSIS — M47.816 SPONDYLOSIS W/OUT MYELOPATHY OR RADICULOPATHY, LUMBAR REGION: ICD-10-CM

## 2019-12-04 DIAGNOSIS — M81.0 AGE-RELATED OSTEOPOROSIS W/OUT CURRENT PATHOLOGICAL FRACTURE: ICD-10-CM

## 2019-12-04 DIAGNOSIS — E78.5 HYPERLIPIDEMIA, UNSPECIFIED: ICD-10-CM

## 2019-12-04 PROCEDURE — G0439: CPT

## 2019-12-04 PROCEDURE — G0444 DEPRESSION SCREEN ANNUAL: CPT | Mod: 59

## 2019-12-04 RX ORDER — BUDESONIDE 9 MG/1
9 TABLET, EXTENDED RELEASE ORAL
Qty: 30 | Refills: 2 | Status: DISCONTINUED | COMMUNITY
Start: 2019-11-18 | End: 2019-12-04

## 2019-12-04 RX ORDER — MESALAMINE 1.2 G/1
1.2 TABLET, DELAYED RELEASE ORAL
Qty: 120 | Refills: 5 | Status: DISCONTINUED | COMMUNITY
Start: 2019-11-07 | End: 2019-12-04

## 2019-12-04 NOTE — ASSESSMENT
[FreeTextEntry1] : 1. HBP- Echo done in past- no LVH. Known white coat element- 24 hour ambu BP done April 2014- overall BP normal with only 14-17% systolic readings above 140 with highest 153. Repeated May 2016 with essentially optimal BP control- avg 118/65 with only 1/44 systolic readings above 140.\par \par Home cuff- readings 24- 4 above 140 in range 140-150- leave on current. Brought in home cuff and assessed as accurate with office reading\par  \par  2. Hyperlipidemia- last LDL 95 in April 2019 on crestor 5- understands lack of data in age group but comfortable remaining on med\par  \par  3. Hypothyroid- clin euthyroid- normal TSH in April 2019- renewed today\par  \par  4. Arrhythmia- doing well on Sotalol under direction Dr. Naranjo-- f/u 6 mos- last 8/19- echo done last 11/15 and essentially normal- plans f/u 2/20\par  \par  5. h/o Ulcerative Colitis- rare occas exacerbations- colon last in Oct 2015 and neg- bx without dysplasia or significant inflammation- Dr Beltran- acute  sx 11/19 and started on oral meds- see notes- doing better- plan is to leave on meds for  to treat for 3 mos- til mid-feb and then reassess\par  \par  6. HCM- mammo 1/14- neg- pros and cons discussed- has decided to defer\par  colon 7/12, 10/15- neg\par  immun- ok- shingrix and flu shot this season completed\par  s/p hysterectomy\par  HC Proxy- - Vladimir and son Boris\par  Depression screening negative \par  \par  7. Osteoporosis- took alendronate from 6/98- 2/10=- On drug holiday- last DEXA 1/15 stable compared 2007/ 2010/2012- see notes- repeat at some point\par \par 8. Episode of dysequilibrium- April 2019- unclear etioilgy- resolved and eval neg- ? viral syndrone- not recurred\par \par Labs in [facility]- Dr Beltran- see no need to repeat- f/u 6 mos or prn\par

## 2019-12-04 NOTE — PHYSICAL EXAM
[No Acute Distress] : no acute distress [Well Nourished] : well nourished [Well Developed] : well developed [PERRL] : pupils equal round and reactive to light [Well-Appearing] : well-appearing [Normal Sclera/Conjunctiva] : normal sclera/conjunctiva [Normal Outer Ear/Nose] : the outer ears and nose were normal in appearance [EOMI] : extraocular movements intact [Normal Oropharynx] : the oropharynx was normal [Supple] : supple [No JVD] : no jugular venous distention [No Lymphadenopathy] : no lymphadenopathy [No Respiratory Distress] : no respiratory distress  [Thyroid Normal, No Nodules] : the thyroid was normal and there were no nodules present [No Accessory Muscle Use] : no accessory muscle use [Clear to Auscultation] : lungs were clear to auscultation bilaterally [Regular Rhythm] : with a regular rhythm [Normal Rate] : normal rate  [Normal S1, S2] : normal S1 and S2 [No Murmur] : no murmur heard [No Abdominal Bruit] : a ~M bruit was not heard ~T in the abdomen [No Carotid Bruits] : no carotid bruits [Pedal Pulses Present] : the pedal pulses are present [No Varicosities] : no varicosities [No Edema] : there was no peripheral edema [No Palpable Aorta] : no palpable aorta [No Extremity Clubbing/Cyanosis] : no extremity clubbing/cyanosis [Soft] : abdomen soft [Non Tender] : non-tender [Non-distended] : non-distended [No Masses] : no abdominal mass palpated [No HSM] : no HSM [Normal Bowel Sounds] : normal bowel sounds [Normal Posterior Cervical Nodes] : no posterior cervical lymphadenopathy [Normal Anterior Cervical Nodes] : no anterior cervical lymphadenopathy [No CVA Tenderness] : no CVA  tenderness [No Spinal Tenderness] : no spinal tenderness [No Joint Swelling] : no joint swelling [No Rash] : no rash [Grossly Normal Strength/Tone] : grossly normal strength/tone [Normal Gait] : normal gait [Coordination Grossly Intact] : coordination grossly intact [No Focal Deficits] : no focal deficits [Deep Tendon Reflexes (DTR)] : deep tendon reflexes were 2+ and symmetric [Normal Affect] : the affect was normal [Normal Insight/Judgement] : insight and judgment were intact [de-identified] : OA chnages of hands- DIP and PIP [de-identified] : TN's partially occluded by cerumen

## 2019-12-04 NOTE — COUNSELING
[Fall prevention counseling provided] : Fall prevention counseling provided [No throw rugs] : No throw rugs [Adequate lighting] : Adequate lighting [Use proper foot wear] : Use proper foot wear [Good understanding] : Patient has a good understanding of lifestyle changes and steps needed to achieve self management goal [None] : None

## 2019-12-04 NOTE — HEALTH RISK ASSESSMENT
[Fair] : ~his/her~ current health as fair  [Very Good] : ~his/her~  mood as very good [No] : No [No falls in past year] : Patient reported no falls in the past year [0] : 1) Little interest or pleasure doing things: Not at all (0) [1] : 2) Feeling down, depressed, or hopeless for several days (1) [HIV test declined] : HIV test declined [Hepatitis C test declined] : Hepatitis C test declined [None] : None [With Family] : lives with family [Retired] : retired [] :  [# Of Children ___] : has [unfilled] children [Feels Safe at Home] : Feels safe at home [Fully functional (bathing, dressing, toileting, transferring, walking, feeding)] : Fully functional (bathing, dressing, toileting, transferring, walking, feeding) [Fully functional (using the telephone, shopping, preparing meals, housekeeping, doing laundry, using] : Fully functional and needs no help or supervision to perform IADLs (using the telephone, shopping, preparing meals, housekeeping, doing laundry, using transportation, managing medications and managing finances) [Reports normal functional visual acuity (ie: able to read med bottle)] : Reports normal functional visual acuity [Smoke Detector] : smoke detector [Seat Belt] :  uses seat belt [Designated Healthcare Proxy] : Designated healthcare proxy [Name: ___] : Health Care Proxy's Name: [unfilled]  [I will adhere to the patient's wishes as expressed in the advance directive except as noted below.] : I will adhere to the patient's wishes as expressed in the advance directive except as noted below [FreeTextEntry1] : ulcerative colitis flair- see HPI [] : No [de-identified] : No hosp or ED or urgicenter [de-identified] : GI; derm- skin check; optho [de-identified] : Exercises at home- walks 2- 21/2 miles daily [Audit-CScore] : 0 [[id number]] : 1 [de-identified] : lactose free diet- somewhat in "flux" as some foods cause sx- self aware [Change in mental status noted] : No change in mental status noted [Language] : denies difficulty with language [Learning/Retaining New Information] : denies difficulty learning/retaining new information [Behavior] : denies difficulty with behavior [Reasoning] : denies difficulty with reasoning [Handling Complex Tasks] : denies difficulty handling complex tasks [Spatial Ability and Orientation] : denies difficulty with spatial ability and orientation [Reports changes in hearing] : Reports no changes in hearing [Reports changes in vision] : Reports no changes in vision [Reports changes in dental health] : Reports no changes in dental health [de-identified] : glasses [de-identified] : Drives without issues [AdvancecareDate] : 12/19

## 2019-12-04 NOTE — HISTORY OF PRESENT ILLNESS
[de-identified] : Last seen in May with contact since- see notes. Had flare of IBD and treated by Dr Beltran with oral budesonide and mesalamine- see GI notes- took mesalamine for one week- diarrhea- felt related- stopped- started budesonide- 3 caps daily- started two weeks ago- sx are improved.PLan is to treat for three months\par \par \par  Adherent with medications as noted without side effects. Appetite good and weight reportedly stable. Active with good exercise tolerance. No sx of chest pain, sob, palpitations, orthopnea, PND, HARDY, edema, lightheadedness..\par

## 2019-12-05 ENCOUNTER — TRANSCRIPTION ENCOUNTER (OUTPATIENT)
Age: 84
End: 2019-12-05

## 2019-12-16 ENCOUNTER — TRANSCRIPTION ENCOUNTER (OUTPATIENT)
Age: 84
End: 2019-12-16

## 2019-12-17 ENCOUNTER — TRANSCRIPTION ENCOUNTER (OUTPATIENT)
Age: 84
End: 2019-12-17

## 2019-12-20 ENCOUNTER — TRANSCRIPTION ENCOUNTER (OUTPATIENT)
Age: 84
End: 2019-12-20

## 2020-01-01 ENCOUNTER — TRANSCRIPTION ENCOUNTER (OUTPATIENT)
Age: 85
End: 2020-01-01

## 2020-01-01 ENCOUNTER — APPOINTMENT (OUTPATIENT)
Dept: INTERNAL MEDICINE | Facility: CLINIC | Age: 85
End: 2020-01-01
Payer: MEDICARE

## 2020-01-01 ENCOUNTER — MED ADMIN CHARGE (OUTPATIENT)
Age: 85
End: 2020-01-01

## 2020-01-01 ENCOUNTER — NON-APPOINTMENT (OUTPATIENT)
Age: 85
End: 2020-01-01

## 2020-01-01 ENCOUNTER — APPOINTMENT (OUTPATIENT)
Dept: GASTROENTEROLOGY | Facility: CLINIC | Age: 85
End: 2020-01-01
Payer: MEDICARE

## 2020-01-01 ENCOUNTER — RESULT REVIEW (OUTPATIENT)
Age: 85
End: 2020-01-01

## 2020-01-01 ENCOUNTER — INPATIENT (INPATIENT)
Facility: HOSPITAL | Age: 85
LOS: 8 days | Discharge: HOME | DRG: 872 | End: 2020-08-11
Attending: HOSPITALIST | Admitting: STUDENT IN AN ORGANIZED HEALTH CARE EDUCATION/TRAINING PROGRAM
Payer: MEDICARE

## 2020-01-01 ENCOUNTER — INPATIENT (INPATIENT)
Facility: HOSPITAL | Age: 85
LOS: 4 days | Discharge: HOME | DRG: 386 | End: 2020-05-20
Attending: HOSPITALIST | Admitting: STUDENT IN AN ORGANIZED HEALTH CARE EDUCATION/TRAINING PROGRAM
Payer: MEDICARE

## 2020-01-01 ENCOUNTER — APPOINTMENT (OUTPATIENT)
Dept: INTERNAL MEDICINE | Facility: CLINIC | Age: 85
End: 2020-01-01

## 2020-01-01 ENCOUNTER — APPOINTMENT (OUTPATIENT)
Dept: INFECTIOUS DISEASE | Facility: CLINIC | Age: 85
End: 2020-01-01
Payer: MEDICARE

## 2020-01-01 ENCOUNTER — APPOINTMENT (OUTPATIENT)
Dept: ELECTROPHYSIOLOGY | Facility: CLINIC | Age: 85
End: 2020-01-01

## 2020-01-01 VITALS
DIASTOLIC BLOOD PRESSURE: 72 MMHG | SYSTOLIC BLOOD PRESSURE: 125 MMHG | HEART RATE: 84 BPM | TEMPERATURE: 98 F | OXYGEN SATURATION: 97 % | RESPIRATION RATE: 18 BRPM

## 2020-01-01 VITALS
OXYGEN SATURATION: 95 % | HEART RATE: 65 BPM | DIASTOLIC BLOOD PRESSURE: 74 MMHG | SYSTOLIC BLOOD PRESSURE: 120 MMHG | RESPIRATION RATE: 18 BRPM | TEMPERATURE: 99 F

## 2020-01-01 VITALS
DIASTOLIC BLOOD PRESSURE: 59 MMHG | HEIGHT: 60 IN | HEART RATE: 51 BPM | TEMPERATURE: 98 F | RESPIRATION RATE: 19 BRPM | OXYGEN SATURATION: 99 % | SYSTOLIC BLOOD PRESSURE: 144 MMHG | WEIGHT: 115.08 LBS

## 2020-01-01 VITALS
HEIGHT: 60 IN | DIASTOLIC BLOOD PRESSURE: 69 MMHG | OXYGEN SATURATION: 96 % | RESPIRATION RATE: 22 BRPM | WEIGHT: 111.99 LBS | TEMPERATURE: 102 F | SYSTOLIC BLOOD PRESSURE: 130 MMHG | HEART RATE: 92 BPM

## 2020-01-01 DIAGNOSIS — Z02.9 ENCOUNTER FOR ADMINISTRATIVE EXAMINATIONS, UNSPECIFIED: ICD-10-CM

## 2020-01-01 DIAGNOSIS — I10 ESSENTIAL (PRIMARY) HYPERTENSION: ICD-10-CM

## 2020-01-01 DIAGNOSIS — Z98.89 OTHER SPECIFIED POSTPROCEDURAL STATES: Chronic | ICD-10-CM

## 2020-01-01 DIAGNOSIS — Z86.19 PERSONAL HISTORY OF OTHER INFECTIOUS AND PARASITIC DISEASES: ICD-10-CM

## 2020-01-01 DIAGNOSIS — K51.90 ULCERATIVE COLITIS, UNSPECIFIED, WITHOUT COMPLICATIONS: ICD-10-CM

## 2020-01-01 DIAGNOSIS — E03.9 HYPOTHYROIDISM, UNSPECIFIED: ICD-10-CM

## 2020-01-01 DIAGNOSIS — I47.1 SUPRAVENTRICULAR TACHYCARDIA: ICD-10-CM

## 2020-01-01 DIAGNOSIS — C18.9 MALIGNANT NEOPLASM OF COLON, UNSPECIFIED: ICD-10-CM

## 2020-01-01 DIAGNOSIS — A04.72 ENTEROCOLITIS DUE TO CLOSTRIDIUM DIFFICILE, NOT SPECIFIED AS RECURRENT: ICD-10-CM

## 2020-01-01 DIAGNOSIS — K51.90 ULCERATIVE COLITIS, UNSPECIFIED, W/OUT COMPLICATIONS: ICD-10-CM

## 2020-01-01 DIAGNOSIS — Z87.19 PERSONAL HISTORY OF OTHER DISEASES OF THE DIGESTIVE SYSTEM: ICD-10-CM

## 2020-01-01 DIAGNOSIS — M25.559 PAIN IN UNSPECIFIED HIP: ICD-10-CM

## 2020-01-01 DIAGNOSIS — Z23 ENCOUNTER FOR IMMUNIZATION: ICD-10-CM

## 2020-01-01 DIAGNOSIS — Z41.9 ENCOUNTER FOR PROCEDURE FOR PURPOSES OTHER THAN REMEDYING HEALTH STATE, UNSPECIFIED: Chronic | ICD-10-CM

## 2020-01-01 DIAGNOSIS — Z79.899 OTHER LONG TERM (CURRENT) DRUG THERAPY: ICD-10-CM

## 2020-01-01 DIAGNOSIS — E78.00 PURE HYPERCHOLESTEROLEMIA, UNSPECIFIED: ICD-10-CM

## 2020-01-01 DIAGNOSIS — A41.9 SEPSIS, UNSPECIFIED ORGANISM: ICD-10-CM

## 2020-01-01 DIAGNOSIS — K64.4 RESIDUAL HEMORRHOIDAL SKIN TAGS: ICD-10-CM

## 2020-01-01 DIAGNOSIS — Z96.642 PRESENCE OF LEFT ARTIFICIAL HIP JOINT: ICD-10-CM

## 2020-01-01 DIAGNOSIS — Z29.9 ENCOUNTER FOR PROPHYLACTIC MEASURES, UNSPECIFIED: ICD-10-CM

## 2020-01-01 DIAGNOSIS — E87.2 ACIDOSIS: ICD-10-CM

## 2020-01-01 DIAGNOSIS — Z93.3 COLOSTOMY STATUS: ICD-10-CM

## 2020-01-01 LAB
ALBUMIN SERPL ELPH-MCNC: 3 G/DL — LOW (ref 3.3–5)
ALBUMIN SERPL ELPH-MCNC: 3.1 G/DL — LOW (ref 3.3–5)
ALBUMIN SERPL ELPH-MCNC: 3.2 G/DL — LOW (ref 3.3–5)
ALBUMIN SERPL ELPH-MCNC: 3.2 G/DL — LOW (ref 3.3–5)
ALBUMIN SERPL ELPH-MCNC: 3.3 G/DL — SIGNIFICANT CHANGE UP (ref 3.3–5)
ALBUMIN SERPL ELPH-MCNC: 3.8 G/DL — SIGNIFICANT CHANGE UP (ref 3.3–5)
ALBUMIN SERPL ELPH-MCNC: 4.1 G/DL — SIGNIFICANT CHANGE UP (ref 3.3–5)
ALP SERPL-CCNC: 40 U/L — SIGNIFICANT CHANGE UP (ref 40–120)
ALP SERPL-CCNC: 41 U/L — SIGNIFICANT CHANGE UP (ref 40–120)
ALP SERPL-CCNC: 44 U/L — SIGNIFICANT CHANGE UP (ref 40–120)
ALP SERPL-CCNC: 45 U/L — SIGNIFICANT CHANGE UP (ref 40–120)
ALP SERPL-CCNC: 48 U/L — SIGNIFICANT CHANGE UP (ref 40–120)
ALP SERPL-CCNC: 53 U/L — SIGNIFICANT CHANGE UP (ref 40–120)
ALP SERPL-CCNC: 53 U/L — SIGNIFICANT CHANGE UP (ref 40–120)
ALP SERPL-CCNC: 54 U/L — SIGNIFICANT CHANGE UP (ref 40–120)
ALP SERPL-CCNC: 62 U/L — SIGNIFICANT CHANGE UP (ref 40–120)
ALT FLD-CCNC: 10 U/L — SIGNIFICANT CHANGE UP (ref 10–45)
ALT FLD-CCNC: 11 U/L — SIGNIFICANT CHANGE UP (ref 10–45)
ALT FLD-CCNC: 12 U/L — SIGNIFICANT CHANGE UP (ref 10–45)
ALT FLD-CCNC: 12 U/L — SIGNIFICANT CHANGE UP (ref 10–45)
ALT FLD-CCNC: 15 U/L — SIGNIFICANT CHANGE UP (ref 10–45)
ALT FLD-CCNC: 9 U/L — LOW (ref 10–45)
ALT FLD-CCNC: 9 U/L — LOW (ref 10–45)
ANION GAP SERPL CALC-SCNC: 13 MMOL/L — SIGNIFICANT CHANGE UP (ref 5–17)
ANION GAP SERPL CALC-SCNC: 14 MMOL/L — SIGNIFICANT CHANGE UP (ref 5–17)
ANION GAP SERPL CALC-SCNC: 14 MMOL/L — SIGNIFICANT CHANGE UP (ref 5–17)
ANION GAP SERPL CALC-SCNC: 15 MMOL/L — SIGNIFICANT CHANGE UP (ref 5–17)
ANION GAP SERPL CALC-SCNC: 16 MMOL/L — SIGNIFICANT CHANGE UP (ref 5–17)
ANION GAP SERPL CALC-SCNC: 17 MMOL/L — SIGNIFICANT CHANGE UP (ref 5–17)
ANION GAP SERPL CALC-SCNC: 18 MMOL/L — HIGH (ref 5–17)
ANISOCYTOSIS BLD QL: SLIGHT — SIGNIFICANT CHANGE UP
APPEARANCE UR: CLEAR — SIGNIFICANT CHANGE UP
APPEARANCE UR: CLEAR — SIGNIFICANT CHANGE UP
APTT BLD: 25.9 SEC — LOW (ref 27.5–36.3)
APTT BLD: 27.8 SEC — SIGNIFICANT CHANGE UP (ref 27.5–35.5)
AST SERPL-CCNC: 15 U/L — SIGNIFICANT CHANGE UP (ref 10–40)
AST SERPL-CCNC: 16 U/L — SIGNIFICANT CHANGE UP (ref 10–40)
AST SERPL-CCNC: 17 U/L — SIGNIFICANT CHANGE UP (ref 10–40)
AST SERPL-CCNC: 18 U/L — SIGNIFICANT CHANGE UP (ref 10–40)
AST SERPL-CCNC: 19 U/L — SIGNIFICANT CHANGE UP (ref 10–40)
AST SERPL-CCNC: 24 U/L — SIGNIFICANT CHANGE UP (ref 10–40)
AST SERPL-CCNC: 25 U/L — SIGNIFICANT CHANGE UP (ref 10–40)
BACTERIA # UR AUTO: NEGATIVE — SIGNIFICANT CHANGE UP
BACTERIA # UR AUTO: NEGATIVE — SIGNIFICANT CHANGE UP
BASE EXCESS BLDV CALC-SCNC: 1.2 MMOL/L — SIGNIFICANT CHANGE UP (ref -2–2)
BASOPHILS # BLD AUTO: 0 K/UL — SIGNIFICANT CHANGE UP (ref 0–0.2)
BASOPHILS # BLD AUTO: 0 K/UL — SIGNIFICANT CHANGE UP (ref 0–0.2)
BASOPHILS # BLD AUTO: 0.03 K/UL — SIGNIFICANT CHANGE UP (ref 0–0.2)
BASOPHILS # BLD AUTO: 0.03 K/UL — SIGNIFICANT CHANGE UP (ref 0–0.2)
BASOPHILS # BLD AUTO: 0.06 K/UL — SIGNIFICANT CHANGE UP (ref 0–0.2)
BASOPHILS # BLD AUTO: 0.07 K/UL — SIGNIFICANT CHANGE UP (ref 0–0.2)
BASOPHILS # BLD AUTO: 0.07 K/UL — SIGNIFICANT CHANGE UP (ref 0–0.2)
BASOPHILS # BLD AUTO: 0.08 K/UL — SIGNIFICANT CHANGE UP (ref 0–0.2)
BASOPHILS NFR BLD AUTO: 0 % — SIGNIFICANT CHANGE UP (ref 0–2)
BASOPHILS NFR BLD AUTO: 0 % — SIGNIFICANT CHANGE UP (ref 0–2)
BASOPHILS NFR BLD AUTO: 0.2 % — SIGNIFICANT CHANGE UP (ref 0–2)
BASOPHILS NFR BLD AUTO: 0.3 % — SIGNIFICANT CHANGE UP (ref 0–2)
BASOPHILS NFR BLD AUTO: 0.3 % — SIGNIFICANT CHANGE UP (ref 0–2)
BASOPHILS NFR BLD AUTO: 0.4 % — SIGNIFICANT CHANGE UP (ref 0–2)
BASOPHILS NFR BLD AUTO: 0.5 % — SIGNIFICANT CHANGE UP (ref 0–2)
BASOPHILS NFR BLD AUTO: 0.6 % — SIGNIFICANT CHANGE UP (ref 0–2)
BILIRUB SERPL-MCNC: 0.1 MG/DL — LOW (ref 0.2–1.2)
BILIRUB SERPL-MCNC: 0.2 MG/DL — SIGNIFICANT CHANGE UP (ref 0.2–1.2)
BILIRUB SERPL-MCNC: 0.2 MG/DL — SIGNIFICANT CHANGE UP (ref 0.2–1.2)
BILIRUB SERPL-MCNC: 0.4 MG/DL — SIGNIFICANT CHANGE UP (ref 0.2–1.2)
BILIRUB SERPL-MCNC: 0.5 MG/DL — SIGNIFICANT CHANGE UP (ref 0.2–1.2)
BILIRUB SERPL-MCNC: 0.6 MG/DL — SIGNIFICANT CHANGE UP (ref 0.2–1.2)
BILIRUB SERPL-MCNC: 0.6 MG/DL — SIGNIFICANT CHANGE UP (ref 0.2–1.2)
BILIRUB UR-MCNC: NEGATIVE — SIGNIFICANT CHANGE UP
BILIRUB UR-MCNC: NEGATIVE — SIGNIFICANT CHANGE UP
BLD GP AB SCN SERPL QL: NEGATIVE — SIGNIFICANT CHANGE UP
BLD GP AB SCN SERPL QL: NEGATIVE — SIGNIFICANT CHANGE UP
BUN SERPL-MCNC: 10 MG/DL — SIGNIFICANT CHANGE UP (ref 7–23)
BUN SERPL-MCNC: 11 MG/DL — SIGNIFICANT CHANGE UP (ref 7–23)
BUN SERPL-MCNC: 12 MG/DL — SIGNIFICANT CHANGE UP (ref 7–23)
BUN SERPL-MCNC: 14 MG/DL — SIGNIFICANT CHANGE UP (ref 7–23)
BUN SERPL-MCNC: 14 MG/DL — SIGNIFICANT CHANGE UP (ref 7–23)
BUN SERPL-MCNC: 15 MG/DL — SIGNIFICANT CHANGE UP (ref 7–23)
BUN SERPL-MCNC: 16 MG/DL — SIGNIFICANT CHANGE UP (ref 7–23)
BUN SERPL-MCNC: 19 MG/DL — SIGNIFICANT CHANGE UP (ref 7–23)
BUN SERPL-MCNC: 21 MG/DL — SIGNIFICANT CHANGE UP (ref 7–23)
BUN SERPL-MCNC: 27 MG/DL — HIGH (ref 7–23)
BUN SERPL-MCNC: 8 MG/DL — SIGNIFICANT CHANGE UP (ref 7–23)
BUN SERPL-MCNC: 9 MG/DL — SIGNIFICANT CHANGE UP (ref 7–23)
C DIFF GDH STL QL: POSITIVE — SIGNIFICANT CHANGE UP
C DIFF GDH STL QL: SIGNIFICANT CHANGE UP
C DIFF TOX GENS STL QL NAA+PROBE: NORMAL
CA-I SERPL-SCNC: 1.15 MMOL/L — SIGNIFICANT CHANGE UP (ref 1.12–1.3)
CALCIUM SERPL-MCNC: 7.7 MG/DL — LOW (ref 8.4–10.5)
CALCIUM SERPL-MCNC: 7.7 MG/DL — LOW (ref 8.4–10.5)
CALCIUM SERPL-MCNC: 7.9 MG/DL — LOW (ref 8.4–10.5)
CALCIUM SERPL-MCNC: 7.9 MG/DL — LOW (ref 8.4–10.5)
CALCIUM SERPL-MCNC: 8 MG/DL — LOW (ref 8.4–10.5)
CALCIUM SERPL-MCNC: 8.1 MG/DL — LOW (ref 8.4–10.5)
CALCIUM SERPL-MCNC: 8.4 MG/DL — SIGNIFICANT CHANGE UP (ref 8.4–10.5)
CALCIUM SERPL-MCNC: 8.5 MG/DL — SIGNIFICANT CHANGE UP (ref 8.4–10.5)
CALCIUM SERPL-MCNC: 8.6 MG/DL — SIGNIFICANT CHANGE UP (ref 8.4–10.5)
CALCIUM SERPL-MCNC: 8.6 MG/DL — SIGNIFICANT CHANGE UP (ref 8.4–10.5)
CALCIUM SERPL-MCNC: 8.7 MG/DL — SIGNIFICANT CHANGE UP (ref 8.4–10.5)
CALCIUM SERPL-MCNC: 8.7 MG/DL — SIGNIFICANT CHANGE UP (ref 8.4–10.5)
CALCIUM SERPL-MCNC: 8.8 MG/DL — SIGNIFICANT CHANGE UP (ref 8.4–10.5)
CALCIUM SERPL-MCNC: 9 MG/DL — SIGNIFICANT CHANGE UP (ref 8.4–10.5)
CALCIUM SERPL-MCNC: 9 MG/DL — SIGNIFICANT CHANGE UP (ref 8.4–10.5)
CALCIUM SERPL-MCNC: 9.1 MG/DL — SIGNIFICANT CHANGE UP (ref 8.4–10.5)
CALPROTECTIN STL-MCNT: 278 UG/G — HIGH (ref 0–120)
CDIFF BY PCR: DETECTED
CEA SERPL-MCNC: 116 NG/ML — HIGH (ref 0–3.8)
CHLORIDE BLDV-SCNC: 112 MMOL/L — HIGH (ref 96–108)
CHLORIDE SERPL-SCNC: 101 MMOL/L — SIGNIFICANT CHANGE UP (ref 96–108)
CHLORIDE SERPL-SCNC: 102 MMOL/L — SIGNIFICANT CHANGE UP (ref 96–108)
CHLORIDE SERPL-SCNC: 103 MMOL/L — SIGNIFICANT CHANGE UP (ref 96–108)
CHLORIDE SERPL-SCNC: 104 MMOL/L — SIGNIFICANT CHANGE UP (ref 96–108)
CHLORIDE SERPL-SCNC: 104 MMOL/L — SIGNIFICANT CHANGE UP (ref 96–108)
CHLORIDE SERPL-SCNC: 105 MMOL/L — SIGNIFICANT CHANGE UP (ref 96–108)
CHLORIDE SERPL-SCNC: 105 MMOL/L — SIGNIFICANT CHANGE UP (ref 96–108)
CHLORIDE SERPL-SCNC: 106 MMOL/L — SIGNIFICANT CHANGE UP (ref 96–108)
CHLORIDE SERPL-SCNC: 107 MMOL/L — SIGNIFICANT CHANGE UP (ref 96–108)
CHLORIDE SERPL-SCNC: 107 MMOL/L — SIGNIFICANT CHANGE UP (ref 96–108)
CHLORIDE SERPL-SCNC: 108 MMOL/L — SIGNIFICANT CHANGE UP (ref 96–108)
CHLORIDE SERPL-SCNC: 108 MMOL/L — SIGNIFICANT CHANGE UP (ref 96–108)
CO2 BLDV-SCNC: 27 MMOL/L — SIGNIFICANT CHANGE UP (ref 22–30)
CO2 SERPL-SCNC: 16 MMOL/L — LOW (ref 22–31)
CO2 SERPL-SCNC: 16 MMOL/L — LOW (ref 22–31)
CO2 SERPL-SCNC: 17 MMOL/L — LOW (ref 22–31)
CO2 SERPL-SCNC: 19 MMOL/L — LOW (ref 22–31)
CO2 SERPL-SCNC: 20 MMOL/L — LOW (ref 22–31)
CO2 SERPL-SCNC: 20 MMOL/L — LOW (ref 22–31)
CO2 SERPL-SCNC: 21 MMOL/L — LOW (ref 22–31)
CO2 SERPL-SCNC: 21 MMOL/L — LOW (ref 22–31)
CO2 SERPL-SCNC: 22 MMOL/L — SIGNIFICANT CHANGE UP (ref 22–31)
CO2 SERPL-SCNC: 23 MMOL/L — SIGNIFICANT CHANGE UP (ref 22–31)
CO2 SERPL-SCNC: 24 MMOL/L — SIGNIFICANT CHANGE UP (ref 22–31)
CO2 SERPL-SCNC: 24 MMOL/L — SIGNIFICANT CHANGE UP (ref 22–31)
CO2 SERPL-SCNC: 25 MMOL/L — SIGNIFICANT CHANGE UP (ref 22–31)
CO2 SERPL-SCNC: 25 MMOL/L — SIGNIFICANT CHANGE UP (ref 22–31)
COLOR SPEC: YELLOW — SIGNIFICANT CHANGE UP
COLOR SPEC: YELLOW — SIGNIFICANT CHANGE UP
CREAT SERPL-MCNC: 0.63 MG/DL — SIGNIFICANT CHANGE UP (ref 0.5–1.3)
CREAT SERPL-MCNC: 0.65 MG/DL — SIGNIFICANT CHANGE UP (ref 0.5–1.3)
CREAT SERPL-MCNC: 0.67 MG/DL — SIGNIFICANT CHANGE UP (ref 0.5–1.3)
CREAT SERPL-MCNC: 0.71 MG/DL — SIGNIFICANT CHANGE UP (ref 0.5–1.3)
CREAT SERPL-MCNC: 0.71 MG/DL — SIGNIFICANT CHANGE UP (ref 0.5–1.3)
CREAT SERPL-MCNC: 0.72 MG/DL — SIGNIFICANT CHANGE UP (ref 0.5–1.3)
CREAT SERPL-MCNC: 0.72 MG/DL — SIGNIFICANT CHANGE UP (ref 0.5–1.3)
CREAT SERPL-MCNC: 0.73 MG/DL — SIGNIFICANT CHANGE UP (ref 0.5–1.3)
CREAT SERPL-MCNC: 0.8 MG/DL — SIGNIFICANT CHANGE UP (ref 0.5–1.3)
CREAT SERPL-MCNC: 0.83 MG/DL — SIGNIFICANT CHANGE UP (ref 0.5–1.3)
CREAT SERPL-MCNC: 0.88 MG/DL — SIGNIFICANT CHANGE UP (ref 0.5–1.3)
CREAT SERPL-MCNC: 0.88 MG/DL — SIGNIFICANT CHANGE UP (ref 0.5–1.3)
CREAT SERPL-MCNC: 0.9 MG/DL — SIGNIFICANT CHANGE UP (ref 0.5–1.3)
CREAT SERPL-MCNC: 0.98 MG/DL — SIGNIFICANT CHANGE UP (ref 0.5–1.3)
CRP SERPL-MCNC: 5.86 MG/DL — HIGH (ref 0–0.4)
CULTURE RESULTS: NO GROWTH — SIGNIFICANT CHANGE UP
CULTURE RESULTS: SIGNIFICANT CHANGE UP
DIFF PNL FLD: ABNORMAL
DIFF PNL FLD: NEGATIVE — SIGNIFICANT CHANGE UP
EOSINOPHIL # BLD AUTO: 0 K/UL — SIGNIFICANT CHANGE UP (ref 0–0.5)
EOSINOPHIL # BLD AUTO: 0 K/UL — SIGNIFICANT CHANGE UP (ref 0–0.5)
EOSINOPHIL # BLD AUTO: 0.02 K/UL — SIGNIFICANT CHANGE UP (ref 0–0.5)
EOSINOPHIL # BLD AUTO: 0.03 K/UL — SIGNIFICANT CHANGE UP (ref 0–0.5)
EOSINOPHIL # BLD AUTO: 0.07 K/UL — SIGNIFICANT CHANGE UP (ref 0–0.5)
EOSINOPHIL # BLD AUTO: 0.09 K/UL — SIGNIFICANT CHANGE UP (ref 0–0.5)
EOSINOPHIL # BLD AUTO: 0.1 K/UL — SIGNIFICANT CHANGE UP (ref 0–0.5)
EOSINOPHIL # BLD AUTO: 0.17 K/UL — SIGNIFICANT CHANGE UP (ref 0–0.5)
EOSINOPHIL NFR BLD AUTO: 0 % — SIGNIFICANT CHANGE UP (ref 0–6)
EOSINOPHIL NFR BLD AUTO: 0 % — SIGNIFICANT CHANGE UP (ref 0–6)
EOSINOPHIL NFR BLD AUTO: 0.1 % — SIGNIFICANT CHANGE UP (ref 0–6)
EOSINOPHIL NFR BLD AUTO: 0.3 % — SIGNIFICANT CHANGE UP (ref 0–6)
EOSINOPHIL NFR BLD AUTO: 0.4 % — SIGNIFICANT CHANGE UP (ref 0–6)
EOSINOPHIL NFR BLD AUTO: 0.5 % — SIGNIFICANT CHANGE UP (ref 0–6)
EOSINOPHIL NFR BLD AUTO: 0.7 % — SIGNIFICANT CHANGE UP (ref 0–6)
EOSINOPHIL NFR BLD AUTO: 1.4 % — SIGNIFICANT CHANGE UP (ref 0–6)
EPI CELLS # UR: 3 — SIGNIFICANT CHANGE UP
EPI CELLS # UR: 5 /HPF — SIGNIFICANT CHANGE UP
ERYTHROCYTE [SEDIMENTATION RATE] IN BLOOD: 12 MM/HR — SIGNIFICANT CHANGE UP (ref 0–20)
GAMMA INTERFERON BACKGROUND BLD IA-ACNC: 0.02 IU/ML — SIGNIFICANT CHANGE UP
GAS PNL BLDV: 139 MMOL/L — SIGNIFICANT CHANGE UP (ref 135–145)
GAS PNL BLDV: SIGNIFICANT CHANGE UP
GLUCOSE BLDV-MCNC: 119 MG/DL — HIGH (ref 70–99)
GLUCOSE SERPL-MCNC: 102 MG/DL — HIGH (ref 70–99)
GLUCOSE SERPL-MCNC: 103 MG/DL — HIGH (ref 70–99)
GLUCOSE SERPL-MCNC: 103 MG/DL — HIGH (ref 70–99)
GLUCOSE SERPL-MCNC: 114 MG/DL — HIGH (ref 70–99)
GLUCOSE SERPL-MCNC: 117 MG/DL — HIGH (ref 70–99)
GLUCOSE SERPL-MCNC: 131 MG/DL — HIGH (ref 70–99)
GLUCOSE SERPL-MCNC: 147 MG/DL — HIGH (ref 70–99)
GLUCOSE SERPL-MCNC: 165 MG/DL — HIGH (ref 70–99)
GLUCOSE SERPL-MCNC: 175 MG/DL — HIGH (ref 70–99)
GLUCOSE SERPL-MCNC: 183 MG/DL — HIGH (ref 70–99)
GLUCOSE SERPL-MCNC: 206 MG/DL — HIGH (ref 70–99)
GLUCOSE SERPL-MCNC: 222 MG/DL — HIGH (ref 70–99)
GLUCOSE SERPL-MCNC: 82 MG/DL — SIGNIFICANT CHANGE UP (ref 70–99)
GLUCOSE SERPL-MCNC: 94 MG/DL — SIGNIFICANT CHANGE UP (ref 70–99)
GLUCOSE SERPL-MCNC: 94 MG/DL — SIGNIFICANT CHANGE UP (ref 70–99)
GLUCOSE SERPL-MCNC: 95 MG/DL — SIGNIFICANT CHANGE UP (ref 70–99)
GLUCOSE UR QL: NEGATIVE — SIGNIFICANT CHANGE UP
GLUCOSE UR QL: NEGATIVE — SIGNIFICANT CHANGE UP
HAV IGG SER QL IA: REACTIVE
HAV IGM SER-ACNC: SIGNIFICANT CHANGE UP
HBV CORE AB SER-ACNC: REACTIVE
HBV DNA # SERPL NAA+PROBE: SIGNIFICANT CHANGE UP IU/ML
HBV DNA SERPL NAA+PROBE-LOG#: SIGNIFICANT CHANGE UP LOG10IU/ML
HBV SURFACE AB SER-ACNC: >1000 MIU/ML — SIGNIFICANT CHANGE UP
HBV SURFACE AG SER-ACNC: SIGNIFICANT CHANGE UP
HCO3 BLDV-SCNC: 25 MMOL/L — SIGNIFICANT CHANGE UP (ref 21–29)
HCT VFR BLD CALC: 28 % — LOW (ref 34.5–45)
HCT VFR BLD CALC: 29 % — LOW (ref 34.5–45)
HCT VFR BLD CALC: 29 % — LOW (ref 34.5–45)
HCT VFR BLD CALC: 29.3 % — LOW (ref 34.5–45)
HCT VFR BLD CALC: 29.4 % — LOW (ref 34.5–45)
HCT VFR BLD CALC: 29.8 % — LOW (ref 34.5–45)
HCT VFR BLD CALC: 30.1 % — LOW (ref 34.5–45)
HCT VFR BLD CALC: 30.6 % — LOW (ref 34.5–45)
HCT VFR BLD CALC: 30.8 % — LOW (ref 34.5–45)
HCT VFR BLD CALC: 31.7 % — LOW (ref 34.5–45)
HCT VFR BLD CALC: 31.8 % — LOW (ref 34.5–45)
HCT VFR BLD CALC: 32.7 % — LOW (ref 34.5–45)
HCT VFR BLD CALC: 32.8 % — LOW (ref 34.5–45)
HCT VFR BLD CALC: 33.2 % — LOW (ref 34.5–45)
HCT VFR BLD CALC: 38 % — SIGNIFICANT CHANGE UP (ref 34.5–45)
HCT VFR BLDA CALC: 39 % — SIGNIFICANT CHANGE UP (ref 39–50)
HCV RNA SERPL NAA DL=5-ACNC: SIGNIFICANT CHANGE UP IU/ML
HCV RNA SPEC NAA+PROBE-LOG IU: SIGNIFICANT CHANGE UP LOG10IU/ML
HGB BLD CALC-MCNC: 12.6 G/DL — SIGNIFICANT CHANGE UP (ref 11.5–15.5)
HGB BLD-MCNC: 10.2 G/DL — LOW (ref 11.5–15.5)
HGB BLD-MCNC: 10.7 G/DL — LOW (ref 11.5–15.5)
HGB BLD-MCNC: 11.6 G/DL — SIGNIFICANT CHANGE UP (ref 11.5–15.5)
HGB BLD-MCNC: 8.8 G/DL — LOW (ref 11.5–15.5)
HGB BLD-MCNC: 8.9 G/DL — LOW (ref 11.5–15.5)
HGB BLD-MCNC: 9.1 G/DL — LOW (ref 11.5–15.5)
HGB BLD-MCNC: 9.1 G/DL — LOW (ref 11.5–15.5)
HGB BLD-MCNC: 9.2 G/DL — LOW (ref 11.5–15.5)
HGB BLD-MCNC: 9.3 G/DL — LOW (ref 11.5–15.5)
HGB BLD-MCNC: 9.4 G/DL — LOW (ref 11.5–15.5)
HGB BLD-MCNC: 9.6 G/DL — LOW (ref 11.5–15.5)
HGB BLD-MCNC: 9.6 G/DL — LOW (ref 11.5–15.5)
HGB BLD-MCNC: 9.7 G/DL — LOW (ref 11.5–15.5)
HYALINE CASTS # UR AUTO: 0 /LPF — SIGNIFICANT CHANGE UP (ref 0–7)
HYALINE CASTS # UR AUTO: 2 /LPF — SIGNIFICANT CHANGE UP (ref 0–2)
IMM GRANULOCYTES NFR BLD AUTO: 0.4 % — SIGNIFICANT CHANGE UP (ref 0–1.5)
IMM GRANULOCYTES NFR BLD AUTO: 0.5 % — SIGNIFICANT CHANGE UP (ref 0–1.5)
IMM GRANULOCYTES NFR BLD AUTO: 1.1 % — SIGNIFICANT CHANGE UP (ref 0–1.5)
IMM GRANULOCYTES NFR BLD AUTO: 1.5 % — SIGNIFICANT CHANGE UP (ref 0–1.5)
IMM GRANULOCYTES NFR BLD AUTO: 1.9 % — HIGH (ref 0–1.5)
IMM GRANULOCYTES NFR BLD AUTO: 2.5 % — HIGH (ref 0–1.5)
INR BLD: 0.97 RATIO — SIGNIFICANT CHANGE UP (ref 0.88–1.16)
INR BLD: 1.14 RATIO — SIGNIFICANT CHANGE UP (ref 0.88–1.16)
KETONES UR-MCNC: ABNORMAL
KETONES UR-MCNC: SIGNIFICANT CHANGE UP
LACTATE BLDV-MCNC: 1.7 MMOL/L — SIGNIFICANT CHANGE UP (ref 0.7–2)
LACTATE BLDV-MCNC: 2.4 MMOL/L — HIGH (ref 0.7–2)
LACTATE SERPL-SCNC: 1 MMOL/L — SIGNIFICANT CHANGE UP (ref 0.7–2)
LACTATE SERPL-SCNC: 1.6 MMOL/L — SIGNIFICANT CHANGE UP (ref 0.7–2)
LACTATE SERPL-SCNC: 2.2 MMOL/L — HIGH (ref 0.7–2)
LACTATE SERPL-SCNC: 2.4 MMOL/L — HIGH (ref 0.7–2)
LACTATE SERPL-SCNC: 2.4 MMOL/L — HIGH (ref 0.7–2)
LACTATE SERPL-SCNC: 2.7 MMOL/L — HIGH (ref 0.7–2)
LACTATE SERPL-SCNC: 3.9 MMOL/L — HIGH (ref 0.7–2)
LACTATE SERPL-SCNC: 3.9 MMOL/L — HIGH (ref 0.7–2)
LEUKOCYTE ESTERASE UR-ACNC: ABNORMAL
LEUKOCYTE ESTERASE UR-ACNC: NEGATIVE — SIGNIFICANT CHANGE UP
LYMPHOCYTES # BLD AUTO: 0.79 K/UL — LOW (ref 1–3.3)
LYMPHOCYTES # BLD AUTO: 1.42 K/UL — SIGNIFICANT CHANGE UP (ref 1–3.3)
LYMPHOCYTES # BLD AUTO: 1.79 K/UL — SIGNIFICANT CHANGE UP (ref 1–3.3)
LYMPHOCYTES # BLD AUTO: 1.8 K/UL — SIGNIFICANT CHANGE UP (ref 1–3.3)
LYMPHOCYTES # BLD AUTO: 1.87 K/UL — SIGNIFICANT CHANGE UP (ref 1–3.3)
LYMPHOCYTES # BLD AUTO: 13.4 % — SIGNIFICANT CHANGE UP (ref 13–44)
LYMPHOCYTES # BLD AUTO: 15.8 % — SIGNIFICANT CHANGE UP (ref 13–44)
LYMPHOCYTES # BLD AUTO: 16.8 % — SIGNIFICANT CHANGE UP (ref 13–44)
LYMPHOCYTES # BLD AUTO: 17.9 % — SIGNIFICANT CHANGE UP (ref 13–44)
LYMPHOCYTES # BLD AUTO: 2.22 K/UL — SIGNIFICANT CHANGE UP (ref 1–3.3)
LYMPHOCYTES # BLD AUTO: 2.23 K/UL — SIGNIFICANT CHANGE UP (ref 1–3.3)
LYMPHOCYTES # BLD AUTO: 2.32 K/UL — SIGNIFICANT CHANGE UP (ref 1–3.3)
LYMPHOCYTES # BLD AUTO: 2.6 % — LOW (ref 13–44)
LYMPHOCYTES # BLD AUTO: 6.1 % — LOW (ref 13–44)
LYMPHOCYTES # BLD AUTO: 8.2 % — LOW (ref 13–44)
LYMPHOCYTES # BLD AUTO: 8.9 % — LOW (ref 13–44)
M TB IFN-G BLD-IMP: NEGATIVE — SIGNIFICANT CHANGE UP
M TB IFN-G CD4+ BCKGRND COR BLD-ACNC: 0.01 IU/ML — SIGNIFICANT CHANGE UP
M TB IFN-G CD4+CD8+ BCKGRND COR BLD-ACNC: 0.01 IU/ML — SIGNIFICANT CHANGE UP
MAGNESIUM SERPL-MCNC: 1.5 MG/DL — LOW (ref 1.6–2.6)
MAGNESIUM SERPL-MCNC: 1.6 MG/DL — SIGNIFICANT CHANGE UP (ref 1.6–2.6)
MAGNESIUM SERPL-MCNC: 1.6 MG/DL — SIGNIFICANT CHANGE UP (ref 1.6–2.6)
MAGNESIUM SERPL-MCNC: 1.7 MG/DL — SIGNIFICANT CHANGE UP (ref 1.6–2.6)
MAGNESIUM SERPL-MCNC: 1.8 MG/DL — SIGNIFICANT CHANGE UP (ref 1.6–2.6)
MAGNESIUM SERPL-MCNC: 2 MG/DL — SIGNIFICANT CHANGE UP (ref 1.6–2.6)
MAGNESIUM SERPL-MCNC: 2.1 MG/DL — SIGNIFICANT CHANGE UP (ref 1.6–2.6)
MANUAL SMEAR VERIFICATION: SIGNIFICANT CHANGE UP
MCHC RBC-ENTMCNC: 26.5 PG — LOW (ref 27–34)
MCHC RBC-ENTMCNC: 26.7 PG — LOW (ref 27–34)
MCHC RBC-ENTMCNC: 26.7 PG — LOW (ref 27–34)
MCHC RBC-ENTMCNC: 26.8 PG — LOW (ref 27–34)
MCHC RBC-ENTMCNC: 26.8 PG — LOW (ref 27–34)
MCHC RBC-ENTMCNC: 26.9 PG — LOW (ref 27–34)
MCHC RBC-ENTMCNC: 26.9 PG — LOW (ref 27–34)
MCHC RBC-ENTMCNC: 27.1 PG — SIGNIFICANT CHANGE UP (ref 27–34)
MCHC RBC-ENTMCNC: 27.1 PG — SIGNIFICANT CHANGE UP (ref 27–34)
MCHC RBC-ENTMCNC: 27.2 PG — SIGNIFICANT CHANGE UP (ref 27–34)
MCHC RBC-ENTMCNC: 27.3 PG — SIGNIFICANT CHANGE UP (ref 27–34)
MCHC RBC-ENTMCNC: 27.4 PG — SIGNIFICANT CHANGE UP (ref 27–34)
MCHC RBC-ENTMCNC: 28.1 PG — SIGNIFICANT CHANGE UP (ref 27–34)
MCHC RBC-ENTMCNC: 28.4 PG — SIGNIFICANT CHANGE UP (ref 27–34)
MCHC RBC-ENTMCNC: 29 PG — SIGNIFICANT CHANGE UP (ref 27–34)
MCHC RBC-ENTMCNC: 30.5 GM/DL — LOW (ref 32–36)
MCHC RBC-ENTMCNC: 30.5 GM/DL — LOW (ref 32–36)
MCHC RBC-ENTMCNC: 30.6 GM/DL — LOW (ref 32–36)
MCHC RBC-ENTMCNC: 30.7 GM/DL — LOW (ref 32–36)
MCHC RBC-ENTMCNC: 30.7 GM/DL — LOW (ref 32–36)
MCHC RBC-ENTMCNC: 31.1 GM/DL — LOW (ref 32–36)
MCHC RBC-ENTMCNC: 31.1 GM/DL — LOW (ref 32–36)
MCHC RBC-ENTMCNC: 31.2 GM/DL — LOW (ref 32–36)
MCHC RBC-ENTMCNC: 31.2 GM/DL — LOW (ref 32–36)
MCHC RBC-ENTMCNC: 31.4 GM/DL — LOW (ref 32–36)
MCHC RBC-ENTMCNC: 32 GM/DL — SIGNIFICANT CHANGE UP (ref 32–36)
MCHC RBC-ENTMCNC: 32.2 GM/DL — SIGNIFICANT CHANGE UP (ref 32–36)
MCHC RBC-ENTMCNC: 32.7 GM/DL — SIGNIFICANT CHANGE UP (ref 32–36)
MCV RBC AUTO: 85.1 FL — SIGNIFICANT CHANGE UP (ref 80–100)
MCV RBC AUTO: 85.2 FL — SIGNIFICANT CHANGE UP (ref 80–100)
MCV RBC AUTO: 85.5 FL — SIGNIFICANT CHANGE UP (ref 80–100)
MCV RBC AUTO: 85.9 FL — SIGNIFICANT CHANGE UP (ref 80–100)
MCV RBC AUTO: 86 FL — SIGNIFICANT CHANGE UP (ref 80–100)
MCV RBC AUTO: 86.6 FL — SIGNIFICANT CHANGE UP (ref 80–100)
MCV RBC AUTO: 86.7 FL — SIGNIFICANT CHANGE UP (ref 80–100)
MCV RBC AUTO: 87.2 FL — SIGNIFICANT CHANGE UP (ref 80–100)
MCV RBC AUTO: 87.6 FL — SIGNIFICANT CHANGE UP (ref 80–100)
MCV RBC AUTO: 88.1 FL — SIGNIFICANT CHANGE UP (ref 80–100)
MCV RBC AUTO: 88.3 FL — SIGNIFICANT CHANGE UP (ref 80–100)
MCV RBC AUTO: 88.3 FL — SIGNIFICANT CHANGE UP (ref 80–100)
MCV RBC AUTO: 88.6 FL — SIGNIFICANT CHANGE UP (ref 80–100)
MCV RBC AUTO: 89.6 FL — SIGNIFICANT CHANGE UP (ref 80–100)
MCV RBC AUTO: 90.4 FL — SIGNIFICANT CHANGE UP (ref 80–100)
MONOCYTES # BLD AUTO: 0.43 K/UL — SIGNIFICANT CHANGE UP (ref 0–0.9)
MONOCYTES # BLD AUTO: 0.82 K/UL — SIGNIFICANT CHANGE UP (ref 0–0.9)
MONOCYTES # BLD AUTO: 0.89 K/UL — SIGNIFICANT CHANGE UP (ref 0–0.9)
MONOCYTES # BLD AUTO: 0.98 K/UL — HIGH (ref 0–0.9)
MONOCYTES # BLD AUTO: 1.27 K/UL — HIGH (ref 0–0.9)
MONOCYTES # BLD AUTO: 1.4 K/UL — HIGH (ref 0–0.9)
MONOCYTES # BLD AUTO: 1.59 K/UL — HIGH (ref 0–0.9)
MONOCYTES # BLD AUTO: 1.82 K/UL — HIGH (ref 0–0.9)
MONOCYTES NFR BLD AUTO: 10.1 % — SIGNIFICANT CHANGE UP (ref 2–14)
MONOCYTES NFR BLD AUTO: 2.7 % — SIGNIFICANT CHANGE UP (ref 2–14)
MONOCYTES NFR BLD AUTO: 3.5 % — SIGNIFICANT CHANGE UP (ref 2–14)
MONOCYTES NFR BLD AUTO: 6 % — SIGNIFICANT CHANGE UP (ref 2–14)
MONOCYTES NFR BLD AUTO: 6.1 % — SIGNIFICANT CHANGE UP (ref 2–14)
MONOCYTES NFR BLD AUTO: 7.1 % — SIGNIFICANT CHANGE UP (ref 2–14)
MONOCYTES NFR BLD AUTO: 7.3 % — SIGNIFICANT CHANGE UP (ref 2–14)
MONOCYTES NFR BLD AUTO: 8.3 % — SIGNIFICANT CHANGE UP (ref 2–14)
NEUTROPHILS # BLD AUTO: 10.4 K/UL — HIGH (ref 1.8–7.4)
NEUTROPHILS # BLD AUTO: 13.97 K/UL — HIGH (ref 1.8–7.4)
NEUTROPHILS # BLD AUTO: 18.01 K/UL — HIGH (ref 1.8–7.4)
NEUTROPHILS # BLD AUTO: 27.78 K/UL — HIGH (ref 1.8–7.4)
NEUTROPHILS # BLD AUTO: 32.86 K/UL — HIGH (ref 1.8–7.4)
NEUTROPHILS # BLD AUTO: 8.8 K/UL — HIGH (ref 1.8–7.4)
NEUTROPHILS # BLD AUTO: 8.88 K/UL — HIGH (ref 1.8–7.4)
NEUTROPHILS # BLD AUTO: 9.72 K/UL — HIGH (ref 1.8–7.4)
NEUTROPHILS NFR BLD AUTO: 70.5 % — SIGNIFICANT CHANGE UP (ref 43–77)
NEUTROPHILS NFR BLD AUTO: 70.5 % — SIGNIFICANT CHANGE UP (ref 43–77)
NEUTROPHILS NFR BLD AUTO: 74.9 % — SIGNIFICANT CHANGE UP (ref 43–77)
NEUTROPHILS NFR BLD AUTO: 77.6 % — HIGH (ref 43–77)
NEUTROPHILS NFR BLD AUTO: 82.7 % — HIGH (ref 43–77)
NEUTROPHILS NFR BLD AUTO: 86.9 % — HIGH (ref 43–77)
NEUTROPHILS NFR BLD AUTO: 87.6 % — HIGH (ref 43–77)
NEUTROPHILS NFR BLD AUTO: 91.4 % — HIGH (ref 43–77)
NEUTS BAND # BLD: 3.5 % — SIGNIFICANT CHANGE UP (ref 0–8)
NITRITE UR-MCNC: NEGATIVE — SIGNIFICANT CHANGE UP
NITRITE UR-MCNC: NEGATIVE — SIGNIFICANT CHANGE UP
NRBC # BLD: 0 /100 WBCS — SIGNIFICANT CHANGE UP (ref 0–0)
OVALOCYTES BLD QL SMEAR: SLIGHT — SIGNIFICANT CHANGE UP
PCO2 BLDV: 41 MMHG — SIGNIFICANT CHANGE UP (ref 35–50)
PH BLDV: 7.41 — SIGNIFICANT CHANGE UP (ref 7.35–7.45)
PH UR: 5.5 — SIGNIFICANT CHANGE UP (ref 5–8)
PH UR: 6.5 — SIGNIFICANT CHANGE UP (ref 5–8)
PHOSPHATE SERPL-MCNC: 1.1 MG/DL — LOW (ref 2.5–4.5)
PHOSPHATE SERPL-MCNC: 1.3 MG/DL — LOW (ref 2.5–4.5)
PHOSPHATE SERPL-MCNC: 1.5 MG/DL — LOW (ref 2.5–4.5)
PHOSPHATE SERPL-MCNC: 2.2 MG/DL — LOW (ref 2.5–4.5)
PHOSPHATE SERPL-MCNC: 2.2 MG/DL — LOW (ref 2.5–4.5)
PHOSPHATE SERPL-MCNC: 2.5 MG/DL — SIGNIFICANT CHANGE UP (ref 2.5–4.5)
PHOSPHATE SERPL-MCNC: 2.6 MG/DL — SIGNIFICANT CHANGE UP (ref 2.5–4.5)
PHOSPHATE SERPL-MCNC: 2.8 MG/DL — SIGNIFICANT CHANGE UP (ref 2.5–4.5)
PHOSPHATE SERPL-MCNC: 2.9 MG/DL — SIGNIFICANT CHANGE UP (ref 2.5–4.5)
PHOSPHATE SERPL-MCNC: 2.9 MG/DL — SIGNIFICANT CHANGE UP (ref 2.5–4.5)
PHOSPHATE SERPL-MCNC: 3.3 MG/DL — SIGNIFICANT CHANGE UP (ref 2.5–4.5)
PHOSPHATE SERPL-MCNC: 3.4 MG/DL — SIGNIFICANT CHANGE UP (ref 2.5–4.5)
PHOSPHATE SERPL-MCNC: 3.5 MG/DL — SIGNIFICANT CHANGE UP (ref 2.5–4.5)
PLAT MORPH BLD: NORMAL — SIGNIFICANT CHANGE UP
PLATELET # BLD AUTO: 211 K/UL — SIGNIFICANT CHANGE UP (ref 150–400)
PLATELET # BLD AUTO: 241 K/UL — SIGNIFICANT CHANGE UP (ref 150–400)
PLATELET # BLD AUTO: 259 K/UL — SIGNIFICANT CHANGE UP (ref 150–400)
PLATELET # BLD AUTO: 265 K/UL — SIGNIFICANT CHANGE UP (ref 150–400)
PLATELET # BLD AUTO: 276 K/UL — SIGNIFICANT CHANGE UP (ref 150–400)
PLATELET # BLD AUTO: 287 K/UL — SIGNIFICANT CHANGE UP (ref 150–400)
PLATELET # BLD AUTO: 288 K/UL — SIGNIFICANT CHANGE UP (ref 150–400)
PLATELET # BLD AUTO: 317 K/UL — SIGNIFICANT CHANGE UP (ref 150–400)
PLATELET # BLD AUTO: 358 K/UL — SIGNIFICANT CHANGE UP (ref 150–400)
PLATELET # BLD AUTO: 384 K/UL — SIGNIFICANT CHANGE UP (ref 150–400)
PLATELET # BLD AUTO: 385 K/UL — SIGNIFICANT CHANGE UP (ref 150–400)
PLATELET # BLD AUTO: 418 K/UL — HIGH (ref 150–400)
PLATELET # BLD AUTO: 456 K/UL — HIGH (ref 150–400)
PLATELET # BLD AUTO: 495 K/UL — HIGH (ref 150–400)
PLATELET # BLD AUTO: 506 K/UL — HIGH (ref 150–400)
PO2 BLDV: 20 MMHG — LOW (ref 25–45)
POLYCHROMASIA BLD QL SMEAR: SLIGHT — SIGNIFICANT CHANGE UP
POTASSIUM BLDV-SCNC: 3.9 MMOL/L — SIGNIFICANT CHANGE UP (ref 3.5–5.3)
POTASSIUM SERPL-MCNC: 3.2 MMOL/L — LOW (ref 3.5–5.3)
POTASSIUM SERPL-MCNC: 3.3 MMOL/L — LOW (ref 3.5–5.3)
POTASSIUM SERPL-MCNC: 3.4 MMOL/L — LOW (ref 3.5–5.3)
POTASSIUM SERPL-MCNC: 3.5 MMOL/L — SIGNIFICANT CHANGE UP (ref 3.5–5.3)
POTASSIUM SERPL-MCNC: 3.6 MMOL/L — SIGNIFICANT CHANGE UP (ref 3.5–5.3)
POTASSIUM SERPL-MCNC: 3.7 MMOL/L — SIGNIFICANT CHANGE UP (ref 3.5–5.3)
POTASSIUM SERPL-MCNC: 3.8 MMOL/L — SIGNIFICANT CHANGE UP (ref 3.5–5.3)
POTASSIUM SERPL-MCNC: 3.8 MMOL/L — SIGNIFICANT CHANGE UP (ref 3.5–5.3)
POTASSIUM SERPL-MCNC: 3.9 MMOL/L — SIGNIFICANT CHANGE UP (ref 3.5–5.3)
POTASSIUM SERPL-MCNC: 4.3 MMOL/L — SIGNIFICANT CHANGE UP (ref 3.5–5.3)
POTASSIUM SERPL-SCNC: 3.2 MMOL/L — LOW (ref 3.5–5.3)
POTASSIUM SERPL-SCNC: 3.3 MMOL/L — LOW (ref 3.5–5.3)
POTASSIUM SERPL-SCNC: 3.4 MMOL/L — LOW (ref 3.5–5.3)
POTASSIUM SERPL-SCNC: 3.5 MMOL/L — SIGNIFICANT CHANGE UP (ref 3.5–5.3)
POTASSIUM SERPL-SCNC: 3.6 MMOL/L — SIGNIFICANT CHANGE UP (ref 3.5–5.3)
POTASSIUM SERPL-SCNC: 3.7 MMOL/L — SIGNIFICANT CHANGE UP (ref 3.5–5.3)
POTASSIUM SERPL-SCNC: 3.8 MMOL/L — SIGNIFICANT CHANGE UP (ref 3.5–5.3)
POTASSIUM SERPL-SCNC: 3.8 MMOL/L — SIGNIFICANT CHANGE UP (ref 3.5–5.3)
POTASSIUM SERPL-SCNC: 3.9 MMOL/L — SIGNIFICANT CHANGE UP (ref 3.5–5.3)
POTASSIUM SERPL-SCNC: 4.3 MMOL/L — SIGNIFICANT CHANGE UP (ref 3.5–5.3)
PROT SERPL-MCNC: 5.4 G/DL — LOW (ref 6–8.3)
PROT SERPL-MCNC: 5.5 G/DL — LOW (ref 6–8.3)
PROT SERPL-MCNC: 5.6 G/DL — LOW (ref 6–8.3)
PROT SERPL-MCNC: 5.9 G/DL — LOW (ref 6–8.3)
PROT SERPL-MCNC: 6 G/DL — SIGNIFICANT CHANGE UP (ref 6–8.3)
PROT SERPL-MCNC: 6.5 G/DL — SIGNIFICANT CHANGE UP (ref 6–8.3)
PROT SERPL-MCNC: 7.3 G/DL — SIGNIFICANT CHANGE UP (ref 6–8.3)
PROT UR-MCNC: ABNORMAL
PROT UR-MCNC: SIGNIFICANT CHANGE UP
PROTHROM AB SERPL-ACNC: 11 SEC — SIGNIFICANT CHANGE UP (ref 10–12.9)
PROTHROM AB SERPL-ACNC: 13.5 SEC — SIGNIFICANT CHANGE UP (ref 10.6–13.6)
QUANT TB PLUS MITOGEN MINUS NIL: 8.57 IU/ML — SIGNIFICANT CHANGE UP
RBC # BLD: 3.29 M/UL — LOW (ref 3.8–5.2)
RBC # BLD: 3.29 M/UL — LOW (ref 3.8–5.2)
RBC # BLD: 3.35 M/UL — LOW (ref 3.8–5.2)
RBC # BLD: 3.36 M/UL — LOW (ref 3.8–5.2)
RBC # BLD: 3.44 M/UL — LOW (ref 3.8–5.2)
RBC # BLD: 3.47 M/UL — LOW (ref 3.8–5.2)
RBC # BLD: 3.47 M/UL — LOW (ref 3.8–5.2)
RBC # BLD: 3.58 M/UL — LOW (ref 3.8–5.2)
RBC # BLD: 3.59 M/UL — LOW (ref 3.8–5.2)
RBC # BLD: 3.59 M/UL — LOW (ref 3.8–5.2)
RBC # BLD: 3.6 M/UL — LOW (ref 3.8–5.2)
RBC # BLD: 3.63 M/UL — LOW (ref 3.8–5.2)
RBC # BLD: 3.69 M/UL — LOW (ref 3.8–5.2)
RBC # BLD: 3.79 M/UL — LOW (ref 3.8–5.2)
RBC # BLD: 4.24 M/UL — SIGNIFICANT CHANGE UP (ref 3.8–5.2)
RBC # FLD: 13.6 % — SIGNIFICANT CHANGE UP (ref 10.3–14.5)
RBC # FLD: 14 % — SIGNIFICANT CHANGE UP (ref 10.3–14.5)
RBC # FLD: 14.1 % — SIGNIFICANT CHANGE UP (ref 10.3–14.5)
RBC # FLD: 14.2 % — SIGNIFICANT CHANGE UP (ref 10.3–14.5)
RBC # FLD: 14.4 % — SIGNIFICANT CHANGE UP (ref 10.3–14.5)
RBC # FLD: 15.9 % — HIGH (ref 10.3–14.5)
RBC # FLD: 16 % — HIGH (ref 10.3–14.5)
RBC # FLD: 16.1 % — HIGH (ref 10.3–14.5)
RBC # FLD: 16.1 % — HIGH (ref 10.3–14.5)
RBC # FLD: 16.2 % — HIGH (ref 10.3–14.5)
RBC # FLD: 16.3 % — HIGH (ref 10.3–14.5)
RBC # FLD: 16.3 % — HIGH (ref 10.3–14.5)
RBC # FLD: 16.5 % — HIGH (ref 10.3–14.5)
RBC BLD AUTO: ABNORMAL
RBC CASTS # UR COMP ASSIST: 1 /HPF — SIGNIFICANT CHANGE UP (ref 0–4)
RBC CASTS # UR COMP ASSIST: 2 /HPF — SIGNIFICANT CHANGE UP (ref 0–4)
RH IG SCN BLD-IMP: POSITIVE — SIGNIFICANT CHANGE UP
RH IG SCN BLD-IMP: POSITIVE — SIGNIFICANT CHANGE UP
SAO2 % BLDV: 29 % — LOW (ref 67–88)
SARS-COV-2 IGG SERPL QL IA: NEGATIVE — SIGNIFICANT CHANGE UP
SARS-COV-2 IGM SERPL IA-ACNC: <3.8 AU/ML — SIGNIFICANT CHANGE UP
SARS-COV-2 RNA SPEC QL NAA+PROBE: SIGNIFICANT CHANGE UP
SODIUM SERPL-SCNC: 137 MMOL/L — SIGNIFICANT CHANGE UP (ref 135–145)
SODIUM SERPL-SCNC: 139 MMOL/L — SIGNIFICANT CHANGE UP (ref 135–145)
SODIUM SERPL-SCNC: 139 MMOL/L — SIGNIFICANT CHANGE UP (ref 135–145)
SODIUM SERPL-SCNC: 140 MMOL/L — SIGNIFICANT CHANGE UP (ref 135–145)
SODIUM SERPL-SCNC: 141 MMOL/L — SIGNIFICANT CHANGE UP (ref 135–145)
SODIUM SERPL-SCNC: 141 MMOL/L — SIGNIFICANT CHANGE UP (ref 135–145)
SODIUM SERPL-SCNC: 142 MMOL/L — SIGNIFICANT CHANGE UP (ref 135–145)
SODIUM SERPL-SCNC: 142 MMOL/L — SIGNIFICANT CHANGE UP (ref 135–145)
SODIUM SERPL-SCNC: 144 MMOL/L — SIGNIFICANT CHANGE UP (ref 135–145)
SODIUM SERPL-SCNC: 145 MMOL/L — SIGNIFICANT CHANGE UP (ref 135–145)
SODIUM SERPL-SCNC: 145 MMOL/L — SIGNIFICANT CHANGE UP (ref 135–145)
SP GR SPEC: 1.01 — SIGNIFICANT CHANGE UP (ref 1.01–1.02)
SP GR SPEC: 1.02 — SIGNIFICANT CHANGE UP (ref 1.01–1.02)
SPECIMEN SOURCE: SIGNIFICANT CHANGE UP
UROBILINOGEN FLD QL: NEGATIVE — SIGNIFICANT CHANGE UP
UROBILINOGEN FLD QL: NEGATIVE — SIGNIFICANT CHANGE UP
WBC # BLD: 10.09 K/UL — SIGNIFICANT CHANGE UP (ref 3.8–10.5)
WBC # BLD: 10.3 K/UL — SIGNIFICANT CHANGE UP (ref 3.8–10.5)
WBC # BLD: 10.85 K/UL — HIGH (ref 3.8–10.5)
WBC # BLD: 11.84 K/UL — HIGH (ref 3.8–10.5)
WBC # BLD: 12.48 K/UL — HIGH (ref 3.8–10.5)
WBC # BLD: 13.41 K/UL — HIGH (ref 3.8–10.5)
WBC # BLD: 13.81 K/UL — HIGH (ref 3.8–10.5)
WBC # BLD: 14.62 K/UL — HIGH (ref 3.8–10.5)
WBC # BLD: 15.95 K/UL — HIGH (ref 3.8–10.5)
WBC # BLD: 21.8 K/UL — HIGH (ref 3.8–10.5)
WBC # BLD: 30.39 K/UL — HIGH (ref 3.8–10.5)
WBC # BLD: 36.35 K/UL — HIGH (ref 3.8–10.5)
WBC # BLD: 4.86 K/UL — SIGNIFICANT CHANGE UP (ref 3.8–10.5)
WBC # BLD: 5.64 K/UL — SIGNIFICANT CHANGE UP (ref 3.8–10.5)
WBC # BLD: 6.09 K/UL — SIGNIFICANT CHANGE UP (ref 3.8–10.5)
WBC # FLD AUTO: 10.09 K/UL — SIGNIFICANT CHANGE UP (ref 3.8–10.5)
WBC # FLD AUTO: 10.3 K/UL — SIGNIFICANT CHANGE UP (ref 3.8–10.5)
WBC # FLD AUTO: 10.85 K/UL — HIGH (ref 3.8–10.5)
WBC # FLD AUTO: 11.84 K/UL — HIGH (ref 3.8–10.5)
WBC # FLD AUTO: 12.48 K/UL — HIGH (ref 3.8–10.5)
WBC # FLD AUTO: 13.41 K/UL — HIGH (ref 3.8–10.5)
WBC # FLD AUTO: 13.81 K/UL — HIGH (ref 3.8–10.5)
WBC # FLD AUTO: 14.62 K/UL — HIGH (ref 3.8–10.5)
WBC # FLD AUTO: 15.95 K/UL — HIGH (ref 3.8–10.5)
WBC # FLD AUTO: 21.8 K/UL — HIGH (ref 3.8–10.5)
WBC # FLD AUTO: 30.39 K/UL — HIGH (ref 3.8–10.5)
WBC # FLD AUTO: 36.35 K/UL — HIGH (ref 3.8–10.5)
WBC # FLD AUTO: 4.86 K/UL — SIGNIFICANT CHANGE UP (ref 3.8–10.5)
WBC # FLD AUTO: 5.64 K/UL — SIGNIFICANT CHANGE UP (ref 3.8–10.5)
WBC # FLD AUTO: 6.09 K/UL — SIGNIFICANT CHANGE UP (ref 3.8–10.5)
WBC UR QL: 2 /HPF — SIGNIFICANT CHANGE UP (ref 0–5)
WBC UR QL: 5 /HPF — SIGNIFICANT CHANGE UP (ref 0–5)

## 2020-01-01 PROCEDURE — 80048 BASIC METABOLIC PNL TOTAL CA: CPT

## 2020-01-01 PROCEDURE — 86850 RBC ANTIBODY SCREEN: CPT

## 2020-01-01 PROCEDURE — 73502 X-RAY EXAM HIP UNI 2-3 VIEWS: CPT

## 2020-01-01 PROCEDURE — 86900 BLOOD TYPING SEROLOGIC ABO: CPT

## 2020-01-01 PROCEDURE — 85610 PROTHROMBIN TIME: CPT

## 2020-01-01 PROCEDURE — 99233 SBSQ HOSP IP/OBS HIGH 50: CPT

## 2020-01-01 PROCEDURE — 99285 EMERGENCY DEPT VISIT HI MDM: CPT | Mod: 25

## 2020-01-01 PROCEDURE — 86901 BLOOD TYPING SEROLOGIC RH(D): CPT

## 2020-01-01 PROCEDURE — 99442: CPT | Mod: 95

## 2020-01-01 PROCEDURE — 83735 ASSAY OF MAGNESIUM: CPT

## 2020-01-01 PROCEDURE — 80053 COMPREHEN METABOLIC PANEL: CPT

## 2020-01-01 PROCEDURE — 96375 TX/PRO/DX INJ NEW DRUG ADDON: CPT

## 2020-01-01 PROCEDURE — 99223 1ST HOSP IP/OBS HIGH 75: CPT | Mod: GC

## 2020-01-01 PROCEDURE — 83993 ASSAY FOR CALPROTECTIN FECAL: CPT

## 2020-01-01 PROCEDURE — 99233 SBSQ HOSP IP/OBS HIGH 50: CPT | Mod: GC

## 2020-01-01 PROCEDURE — 84295 ASSAY OF SERUM SODIUM: CPT

## 2020-01-01 PROCEDURE — 82378 CARCINOEMBRYONIC ANTIGEN: CPT

## 2020-01-01 PROCEDURE — 74177 CT ABD & PELVIS W/CONTRAST: CPT | Mod: 26

## 2020-01-01 PROCEDURE — 97162 PT EVAL MOD COMPLEX 30 MIN: CPT

## 2020-01-01 PROCEDURE — 99232 SBSQ HOSP IP/OBS MODERATE 35: CPT

## 2020-01-01 PROCEDURE — 99232 SBSQ HOSP IP/OBS MODERATE 35: CPT | Mod: GC

## 2020-01-01 PROCEDURE — 90662 IIV NO PRSV INCREASED AG IM: CPT

## 2020-01-01 PROCEDURE — 88305 TISSUE EXAM BY PATHOLOGIST: CPT

## 2020-01-01 PROCEDURE — 86769 SARS-COV-2 COVID-19 ANTIBODY: CPT

## 2020-01-01 PROCEDURE — 71260 CT THORAX DX C+: CPT | Mod: 26

## 2020-01-01 PROCEDURE — 93010 ELECTROCARDIOGRAM REPORT: CPT

## 2020-01-01 PROCEDURE — 76937 US GUIDE VASCULAR ACCESS: CPT | Mod: 26

## 2020-01-01 PROCEDURE — 93970 EXTREMITY STUDY: CPT

## 2020-01-01 PROCEDURE — 87507 IADNA-DNA/RNA PROBE TQ 12-25: CPT

## 2020-01-01 PROCEDURE — 71260 CT THORAX DX C+: CPT

## 2020-01-01 PROCEDURE — 82803 BLOOD GASES ANY COMBINATION: CPT

## 2020-01-01 PROCEDURE — G0008: CPT

## 2020-01-01 PROCEDURE — 99222 1ST HOSP IP/OBS MODERATE 55: CPT

## 2020-01-01 PROCEDURE — 73502 X-RAY EXAM HIP UNI 2-3 VIEWS: CPT | Mod: 26,RT

## 2020-01-01 PROCEDURE — 82330 ASSAY OF CALCIUM: CPT

## 2020-01-01 PROCEDURE — 84100 ASSAY OF PHOSPHORUS: CPT

## 2020-01-01 PROCEDURE — 85652 RBC SED RATE AUTOMATED: CPT

## 2020-01-01 PROCEDURE — 83605 ASSAY OF LACTIC ACID: CPT

## 2020-01-01 PROCEDURE — 87040 BLOOD CULTURE FOR BACTERIA: CPT

## 2020-01-01 PROCEDURE — 99285 EMERGENCY DEPT VISIT HI MDM: CPT | Mod: CS

## 2020-01-01 PROCEDURE — 85730 THROMBOPLASTIN TIME PARTIAL: CPT

## 2020-01-01 PROCEDURE — 93970 EXTREMITY STUDY: CPT | Mod: 26

## 2020-01-01 PROCEDURE — 99213 OFFICE O/P EST LOW 20 MIN: CPT | Mod: 95

## 2020-01-01 PROCEDURE — 86706 HEP B SURFACE ANTIBODY: CPT

## 2020-01-01 PROCEDURE — 99213 OFFICE O/P EST LOW 20 MIN: CPT

## 2020-01-01 PROCEDURE — 86704 HEP B CORE ANTIBODY TOTAL: CPT

## 2020-01-01 PROCEDURE — 87086 URINE CULTURE/COLONY COUNT: CPT

## 2020-01-01 PROCEDURE — 99421 OL DIG E/M SVC 5-10 MIN: CPT

## 2020-01-01 PROCEDURE — 99443: CPT | Mod: 95

## 2020-01-01 PROCEDURE — 76937 US GUIDE VASCULAR ACCESS: CPT

## 2020-01-01 PROCEDURE — 87517 HEPATITIS B DNA QUANT: CPT

## 2020-01-01 PROCEDURE — 99285 EMERGENCY DEPT VISIT HI MDM: CPT | Mod: GC

## 2020-01-01 PROCEDURE — 99239 HOSP IP/OBS DSCHRG MGMT >30: CPT

## 2020-01-01 PROCEDURE — 82435 ASSAY OF BLOOD CHLORIDE: CPT

## 2020-01-01 PROCEDURE — 86480 TB TEST CELL IMMUN MEASURE: CPT

## 2020-01-01 PROCEDURE — 86140 C-REACTIVE PROTEIN: CPT

## 2020-01-01 PROCEDURE — 85027 COMPLETE CBC AUTOMATED: CPT

## 2020-01-01 PROCEDURE — 86708 HEPATITIS A ANTIBODY: CPT

## 2020-01-01 PROCEDURE — 88305 TISSUE EXAM BY PATHOLOGIST: CPT | Mod: 26

## 2020-01-01 PROCEDURE — 82947 ASSAY GLUCOSE BLOOD QUANT: CPT

## 2020-01-01 PROCEDURE — 81001 URINALYSIS AUTO W/SCOPE: CPT

## 2020-01-01 PROCEDURE — 45331 SIGMOIDOSCOPY AND BIOPSY: CPT | Mod: GC

## 2020-01-01 PROCEDURE — 85014 HEMATOCRIT: CPT

## 2020-01-01 PROCEDURE — 87324 CLOSTRIDIUM AG IA: CPT

## 2020-01-01 PROCEDURE — 87340 HEPATITIS B SURFACE AG IA: CPT

## 2020-01-01 PROCEDURE — 99223 1ST HOSP IP/OBS HIGH 75: CPT

## 2020-01-01 PROCEDURE — 83690 ASSAY OF LIPASE: CPT

## 2020-01-01 PROCEDURE — 87449 NOS EACH ORGANISM AG IA: CPT

## 2020-01-01 PROCEDURE — 12345: CPT | Mod: NC,GC

## 2020-01-01 PROCEDURE — 74177 CT ABD & PELVIS W/CONTRAST: CPT

## 2020-01-01 PROCEDURE — 99222 1ST HOSP IP/OBS MODERATE 55: CPT | Mod: GC

## 2020-01-01 PROCEDURE — 86709 HEPATITIS A IGM ANTIBODY: CPT

## 2020-01-01 PROCEDURE — U0003: CPT

## 2020-01-01 PROCEDURE — 93005 ELECTROCARDIOGRAM TRACING: CPT

## 2020-01-01 PROCEDURE — 84132 ASSAY OF SERUM POTASSIUM: CPT

## 2020-01-01 PROCEDURE — 87522 HEPATITIS C REVRS TRNSCRPJ: CPT

## 2020-01-01 PROCEDURE — 96374 THER/PROPH/DIAG INJ IV PUSH: CPT

## 2020-01-01 PROCEDURE — 96374 THER/PROPH/DIAG INJ IV PUSH: CPT | Mod: XU

## 2020-01-01 RX ORDER — POTASSIUM CHLORIDE 20 MEQ
40 PACKET (EA) ORAL EVERY 4 HOURS
Refills: 0 | Status: COMPLETED | OUTPATIENT
Start: 2020-01-01 | End: 2020-01-01

## 2020-01-01 RX ORDER — SOTALOL HCL 120 MG
40 TABLET ORAL
Refills: 0 | Status: DISCONTINUED | OUTPATIENT
Start: 2020-01-01 | End: 2020-01-01

## 2020-01-01 RX ORDER — VANCOMYCIN HCL 1 G
125 VIAL (EA) INTRAVENOUS
Qty: 128 | Refills: 0
Start: 2020-01-01

## 2020-01-01 RX ORDER — SODIUM CHLORIDE 9 MG/ML
1000 INJECTION, SOLUTION INTRAVENOUS
Refills: 0 | Status: DISCONTINUED | OUTPATIENT
Start: 2020-01-01 | End: 2020-01-01

## 2020-01-01 RX ORDER — MESALAMINE 375 MG/1
0.38 CAPSULE, EXTENDED RELEASE ORAL DAILY
Qty: 120 | Refills: 6 | Status: DISCONTINUED | COMMUNITY
Start: 2020-01-01 | End: 2020-01-01

## 2020-01-01 RX ORDER — POTASSIUM CHLORIDE 20 MEQ
40 PACKET (EA) ORAL ONCE
Refills: 0 | Status: COMPLETED | OUTPATIENT
Start: 2020-01-01 | End: 2020-01-01

## 2020-01-01 RX ORDER — LEVOTHYROXINE SODIUM 125 MCG
1 TABLET ORAL
Qty: 0 | Refills: 0 | DISCHARGE

## 2020-01-01 RX ORDER — MESALAMINE 4 G/60ML
4 ENEMA RECTAL
Qty: 28 | Refills: 5 | Status: DISCONTINUED | COMMUNITY
Start: 2020-01-01 | End: 2020-01-01

## 2020-01-01 RX ORDER — LEVOTHYROXINE SODIUM 125 MCG
50 TABLET ORAL DAILY
Refills: 0 | Status: DISCONTINUED | OUTPATIENT
Start: 2020-01-01 | End: 2020-01-01

## 2020-01-01 RX ORDER — SODIUM CHLORIDE 9 MG/ML
1500 INJECTION INTRAMUSCULAR; INTRAVENOUS; SUBCUTANEOUS ONCE
Refills: 0 | Status: COMPLETED | OUTPATIENT
Start: 2020-01-01 | End: 2020-01-01

## 2020-01-01 RX ORDER — ENOXAPARIN SODIUM 100 MG/ML
40 INJECTION SUBCUTANEOUS DAILY
Refills: 0 | Status: DISCONTINUED | OUTPATIENT
Start: 2020-01-01 | End: 2020-01-01

## 2020-01-01 RX ORDER — PREDNISONE 5 MG/1
5 TABLET ORAL
Qty: 250 | Refills: 0 | Status: DISCONTINUED | COMMUNITY
Start: 2020-01-01 | End: 2020-01-01

## 2020-01-01 RX ORDER — VANCOMYCIN HCL 1 G
2.5 VIAL (EA) INTRAVENOUS
Qty: 128 | Refills: 0
Start: 2020-01-01

## 2020-01-01 RX ORDER — SODIUM,POTASSIUM PHOSPHATES 278-250MG
4 POWDER IN PACKET (EA) ORAL ONCE
Refills: 0 | Status: COMPLETED | OUTPATIENT
Start: 2020-01-01 | End: 2020-01-01

## 2020-01-01 RX ORDER — MAGNESIUM SULFATE 500 MG/ML
2 VIAL (ML) INJECTION ONCE
Refills: 0 | Status: COMPLETED | OUTPATIENT
Start: 2020-01-01 | End: 2020-01-01

## 2020-01-01 RX ORDER — POTASSIUM PHOSPHATE, MONOBASIC POTASSIUM PHOSPHATE, DIBASIC 236; 224 MG/ML; MG/ML
30 INJECTION, SOLUTION INTRAVENOUS ONCE
Refills: 0 | Status: COMPLETED | OUTPATIENT
Start: 2020-01-01 | End: 2020-01-01

## 2020-01-01 RX ORDER — LEVOTHYROXINE SODIUM 125 MCG
1 TABLET ORAL
Qty: 0 | Refills: 0 | DISCHARGE
Start: 2020-01-01

## 2020-01-01 RX ORDER — LOSARTAN POTASSIUM 100 MG/1
1 TABLET, FILM COATED ORAL
Qty: 14 | Refills: 0
Start: 2020-01-01 | End: 2020-01-01

## 2020-01-01 RX ORDER — SOTALOL HCL 120 MG
0.5 TABLET ORAL
Qty: 0 | Refills: 0 | DISCHARGE

## 2020-01-01 RX ORDER — VANCOMYCIN HCL 1 G
125 VIAL (EA) INTRAVENOUS EVERY 6 HOURS
Refills: 0 | Status: DISCONTINUED | OUTPATIENT
Start: 2020-01-01 | End: 2020-01-01

## 2020-01-01 RX ORDER — FIDAXOMICIN 200 MG/1
200 TABLET, FILM COATED ORAL
Qty: 20 | Refills: 1 | Status: DISCONTINUED | COMMUNITY
Start: 2020-01-01 | End: 2020-01-01

## 2020-01-01 RX ORDER — SODIUM CHLORIDE 9 MG/ML
1000 INJECTION INTRAMUSCULAR; INTRAVENOUS; SUBCUTANEOUS ONCE
Refills: 0 | Status: COMPLETED | OUTPATIENT
Start: 2020-01-01 | End: 2020-01-01

## 2020-01-01 RX ORDER — METRONIDAZOLE 500 MG
500 TABLET ORAL EVERY 8 HOURS
Refills: 0 | Status: DISCONTINUED | OUTPATIENT
Start: 2020-01-01 | End: 2020-01-01

## 2020-01-01 RX ORDER — VANCOMYCIN HYDROCHLORIDE 250 MG/5ML
250 SOLUTION ORAL DAILY
Qty: 1 | Refills: 3 | Status: DISCONTINUED | COMMUNITY
Start: 2020-01-01 | End: 2020-01-01

## 2020-01-01 RX ORDER — LOSARTAN POTASSIUM 100 MG/1
1 TABLET, FILM COATED ORAL
Qty: 0 | Refills: 0 | DISCHARGE

## 2020-01-01 RX ORDER — CHOLESTYRAMINE 4 G/9G
4 POWDER, FOR SUSPENSION ORAL DAILY
Refills: 0 | Status: DISCONTINUED | OUTPATIENT
Start: 2020-01-01 | End: 2020-01-01

## 2020-01-01 RX ORDER — SODIUM,POTASSIUM PHOSPHATES 278-250MG
2 POWDER IN PACKET (EA) ORAL
Refills: 0 | Status: COMPLETED | OUTPATIENT
Start: 2020-01-01 | End: 2020-01-01

## 2020-01-01 RX ORDER — HYDROCORTISONE 25 MG/G
2.5 CREAM TOPICAL
Qty: 1 | Refills: 5 | Status: DISCONTINUED | COMMUNITY
Start: 2018-11-26 | End: 2020-01-01

## 2020-01-01 RX ORDER — ATORVASTATIN CALCIUM 80 MG/1
20 TABLET, FILM COATED ORAL AT BEDTIME
Refills: 0 | Status: DISCONTINUED | OUTPATIENT
Start: 2020-01-01 | End: 2020-01-01

## 2020-01-01 RX ORDER — METRONIDAZOLE 500 MG
TABLET ORAL
Refills: 0 | Status: DISCONTINUED | OUTPATIENT
Start: 2020-01-01 | End: 2020-01-01

## 2020-01-01 RX ORDER — BUDESONIDE 3 MG/1
3 CAPSULE, COATED PELLETS ORAL DAILY
Qty: 90 | Refills: 0 | Status: DISCONTINUED | COMMUNITY
Start: 2019-11-18 | End: 2020-01-01

## 2020-01-01 RX ORDER — METRONIDAZOLE 500 MG
500 TABLET ORAL ONCE
Refills: 0 | Status: COMPLETED | OUTPATIENT
Start: 2020-01-01 | End: 2020-01-01

## 2020-01-01 RX ORDER — HYDROCORTISONE 2.5% 25 MG/G
2.5 CREAM TOPICAL
Qty: 1 | Refills: 2 | Status: DISCONTINUED | COMMUNITY
Start: 2020-01-01 | End: 2020-01-01

## 2020-01-01 RX ORDER — POTASSIUM PHOSPHATE, MONOBASIC POTASSIUM PHOSPHATE, DIBASIC 236; 224 MG/ML; MG/ML
15 INJECTION, SOLUTION INTRAVENOUS ONCE
Refills: 0 | Status: COMPLETED | OUTPATIENT
Start: 2020-01-01 | End: 2020-01-01

## 2020-01-01 RX ORDER — BUDESONIDE, MICRONIZED 100 %
1 POWDER (GRAM) MISCELLANEOUS
Qty: 0 | Refills: 0 | DISCHARGE

## 2020-01-01 RX ORDER — PANTOPRAZOLE SODIUM 20 MG/1
40 TABLET, DELAYED RELEASE ORAL
Refills: 0 | Status: DISCONTINUED | OUTPATIENT
Start: 2020-01-01 | End: 2020-01-01

## 2020-01-01 RX ORDER — LOSARTAN POTASSIUM 100 MG/1
50 TABLET, FILM COATED ORAL DAILY
Refills: 0 | Status: DISCONTINUED | OUTPATIENT
Start: 2020-01-01 | End: 2020-01-01

## 2020-01-01 RX ORDER — LEVOTHYROXINE SODIUM 125 MCG
100 TABLET ORAL DAILY
Refills: 0 | Status: DISCONTINUED | OUTPATIENT
Start: 2020-01-01 | End: 2020-01-01

## 2020-01-01 RX ORDER — POTASSIUM CHLORIDE 20 MEQ
1 PACKET (EA) ORAL
Qty: 7 | Refills: 0
Start: 2020-01-01 | End: 2020-01-01

## 2020-01-01 RX ORDER — SOTALOL HCL 120 MG
40 TABLET ORAL EVERY 12 HOURS
Refills: 0 | Status: DISCONTINUED | OUTPATIENT
Start: 2020-01-01 | End: 2020-01-01

## 2020-01-01 RX ORDER — PANTOPRAZOLE 20 MG/1
20 TABLET, DELAYED RELEASE ORAL DAILY
Qty: 30 | Refills: 2 | Status: DISCONTINUED | COMMUNITY
Start: 2020-01-01 | End: 2020-01-01

## 2020-01-01 RX ORDER — ACETAMINOPHEN 500 MG
650 TABLET ORAL EVERY 6 HOURS
Refills: 0 | Status: DISCONTINUED | OUTPATIENT
Start: 2020-01-01 | End: 2020-01-01

## 2020-01-01 RX ORDER — ONDANSETRON 8 MG/1
4 TABLET, FILM COATED ORAL ONCE
Refills: 0 | Status: COMPLETED | OUTPATIENT
Start: 2020-01-01 | End: 2020-01-01

## 2020-01-01 RX ORDER — SODIUM,POTASSIUM PHOSPHATES 278-250MG
3 POWDER IN PACKET (EA) ORAL ONCE
Refills: 0 | Status: COMPLETED | OUTPATIENT
Start: 2020-01-01 | End: 2020-01-01

## 2020-01-01 RX ORDER — PANTOPRAZOLE 20 MG/1
20 TABLET, DELAYED RELEASE ORAL TWICE DAILY
Qty: 60 | Refills: 2 | Status: DISCONTINUED | COMMUNITY
Start: 2020-01-01 | End: 2020-01-01

## 2020-01-01 RX ORDER — ONDANSETRON 8 MG/1
4 TABLET, FILM COATED ORAL EVERY 6 HOURS
Refills: 0 | Status: DISCONTINUED | OUTPATIENT
Start: 2020-01-01 | End: 2020-01-01

## 2020-01-01 RX ORDER — ROSUVASTATIN CALCIUM 5 MG/1
1 TABLET ORAL
Qty: 0 | Refills: 0 | DISCHARGE

## 2020-01-01 RX ORDER — BENZOCAINE AND MENTHOL 5; 1 G/100ML; G/100ML
1 LIQUID ORAL ONCE
Refills: 0 | Status: COMPLETED | OUTPATIENT
Start: 2020-01-01 | End: 2020-01-01

## 2020-01-01 RX ORDER — METRONIDAZOLE 250 MG/1
250 TABLET ORAL EVERY 6 HOURS
Qty: 40 | Refills: 0 | Status: DISCONTINUED | COMMUNITY
Start: 2020-01-01 | End: 2020-01-01

## 2020-01-01 RX ORDER — MESALAMINE 800 MG/1
800 TABLET, DELAYED RELEASE ORAL
Qty: 180 | Refills: 5 | Status: DISCONTINUED | COMMUNITY
Start: 2020-01-01 | End: 2020-01-01

## 2020-01-01 RX ORDER — ATORVASTATIN CALCIUM 80 MG/1
1 TABLET, FILM COATED ORAL
Qty: 14 | Refills: 0
Start: 2020-01-01 | End: 2020-01-01

## 2020-01-01 RX ORDER — MORPHINE SULFATE 50 MG/1
2 CAPSULE, EXTENDED RELEASE ORAL ONCE
Refills: 0 | Status: DISCONTINUED | OUTPATIENT
Start: 2020-01-01 | End: 2020-01-01

## 2020-01-01 RX ADMIN — Medication 650 MILLIGRAM(S): at 04:39

## 2020-01-01 RX ADMIN — ATORVASTATIN CALCIUM 20 MILLIGRAM(S): 80 TABLET, FILM COATED ORAL at 23:11

## 2020-01-01 RX ADMIN — Medication 40 MILLIEQUIVALENT(S): at 00:35

## 2020-01-01 RX ADMIN — Medication 50 MICROGRAM(S): at 05:44

## 2020-01-01 RX ADMIN — Medication 40 MILLIGRAM(S): at 05:06

## 2020-01-01 RX ADMIN — Medication 125 MILLIGRAM(S): at 22:35

## 2020-01-01 RX ADMIN — Medication 650 MILLIGRAM(S): at 22:29

## 2020-01-01 RX ADMIN — ENOXAPARIN SODIUM 40 MILLIGRAM(S): 100 INJECTION SUBCUTANEOUS at 12:15

## 2020-01-01 RX ADMIN — Medication 100 MILLIGRAM(S): at 04:46

## 2020-01-01 RX ADMIN — Medication 125 MILLIGRAM(S): at 05:44

## 2020-01-01 RX ADMIN — Medication 20 MILLIGRAM(S): at 21:33

## 2020-01-01 RX ADMIN — ATORVASTATIN CALCIUM 20 MILLIGRAM(S): 80 TABLET, FILM COATED ORAL at 21:14

## 2020-01-01 RX ADMIN — Medication 40 MILLIGRAM(S): at 06:52

## 2020-01-01 RX ADMIN — Medication 125 MILLIGRAM(S): at 05:51

## 2020-01-01 RX ADMIN — Medication 100 MILLIGRAM(S): at 04:39

## 2020-01-01 RX ADMIN — Medication 40 MILLIGRAM(S): at 05:35

## 2020-01-01 RX ADMIN — Medication 50 MICROGRAM(S): at 05:23

## 2020-01-01 RX ADMIN — ATORVASTATIN CALCIUM 20 MILLIGRAM(S): 80 TABLET, FILM COATED ORAL at 22:55

## 2020-01-01 RX ADMIN — Medication 100 MILLIGRAM(S): at 20:24

## 2020-01-01 RX ADMIN — Medication 20 MILLIGRAM(S): at 05:43

## 2020-01-01 RX ADMIN — Medication 40 MILLIGRAM(S): at 19:06

## 2020-01-01 RX ADMIN — Medication 100 MILLIGRAM(S): at 21:46

## 2020-01-01 RX ADMIN — ATORVASTATIN CALCIUM 20 MILLIGRAM(S): 80 TABLET, FILM COATED ORAL at 21:38

## 2020-01-01 RX ADMIN — Medication 40 MILLIGRAM(S): at 06:32

## 2020-01-01 RX ADMIN — ENOXAPARIN SODIUM 40 MILLIGRAM(S): 100 INJECTION SUBCUTANEOUS at 11:33

## 2020-01-01 RX ADMIN — Medication 125 MILLIGRAM(S): at 06:01

## 2020-01-01 RX ADMIN — Medication 125 MILLIGRAM(S): at 05:22

## 2020-01-01 RX ADMIN — Medication 100 MILLIGRAM(S): at 13:35

## 2020-01-01 RX ADMIN — ENOXAPARIN SODIUM 40 MILLIGRAM(S): 100 INJECTION SUBCUTANEOUS at 12:23

## 2020-01-01 RX ADMIN — ONDANSETRON 4 MILLIGRAM(S): 8 TABLET, FILM COATED ORAL at 16:57

## 2020-01-01 RX ADMIN — Medication 40 MILLIGRAM(S): at 05:05

## 2020-01-01 RX ADMIN — POTASSIUM PHOSPHATE, MONOBASIC POTASSIUM PHOSPHATE, DIBASIC 83.33 MILLIMOLE(S): 236; 224 INJECTION, SOLUTION INTRAVENOUS at 19:36

## 2020-01-01 RX ADMIN — ATORVASTATIN CALCIUM 20 MILLIGRAM(S): 80 TABLET, FILM COATED ORAL at 21:39

## 2020-01-01 RX ADMIN — Medication 40 MILLIGRAM(S): at 17:43

## 2020-01-01 RX ADMIN — ATORVASTATIN CALCIUM 20 MILLIGRAM(S): 80 TABLET, FILM COATED ORAL at 21:33

## 2020-01-01 RX ADMIN — Medication 125 MILLIGRAM(S): at 00:03

## 2020-01-01 RX ADMIN — Medication 650 MILLIGRAM(S): at 05:09

## 2020-01-01 RX ADMIN — Medication 50 MICROGRAM(S): at 05:05

## 2020-01-01 RX ADMIN — Medication 40 MILLIGRAM(S): at 05:51

## 2020-01-01 RX ADMIN — Medication 125 MILLIGRAM(S): at 12:28

## 2020-01-01 RX ADMIN — Medication 40 MILLIGRAM(S): at 18:45

## 2020-01-01 RX ADMIN — Medication 100 MILLIGRAM(S): at 23:00

## 2020-01-01 RX ADMIN — Medication 125 MILLIGRAM(S): at 06:24

## 2020-01-01 RX ADMIN — Medication 40 MILLIGRAM(S): at 06:14

## 2020-01-01 RX ADMIN — ENOXAPARIN SODIUM 40 MILLIGRAM(S): 100 INJECTION SUBCUTANEOUS at 12:53

## 2020-01-01 RX ADMIN — ENOXAPARIN SODIUM 40 MILLIGRAM(S): 100 INJECTION SUBCUTANEOUS at 11:50

## 2020-01-01 RX ADMIN — MORPHINE SULFATE 2 MILLIGRAM(S): 50 CAPSULE, EXTENDED RELEASE ORAL at 16:57

## 2020-01-01 RX ADMIN — Medication 650 MILLIGRAM(S): at 23:00

## 2020-01-01 RX ADMIN — ENOXAPARIN SODIUM 40 MILLIGRAM(S): 100 INJECTION SUBCUTANEOUS at 13:35

## 2020-01-01 RX ADMIN — Medication 125 MILLIGRAM(S): at 00:05

## 2020-01-01 RX ADMIN — Medication 125 MILLIGRAM(S): at 12:53

## 2020-01-01 RX ADMIN — Medication 100 MILLIGRAM(S): at 11:47

## 2020-01-01 RX ADMIN — Medication 125 MILLIGRAM(S): at 06:32

## 2020-01-01 RX ADMIN — ATORVASTATIN CALCIUM 20 MILLIGRAM(S): 80 TABLET, FILM COATED ORAL at 22:08

## 2020-01-01 RX ADMIN — SODIUM CHLORIDE 75 MILLILITER(S): 9 INJECTION, SOLUTION INTRAVENOUS at 12:55

## 2020-01-01 RX ADMIN — Medication 100 MILLIGRAM(S): at 05:46

## 2020-01-01 RX ADMIN — Medication 100 MILLIGRAM(S): at 09:33

## 2020-01-01 RX ADMIN — Medication 50 MICROGRAM(S): at 05:50

## 2020-01-01 RX ADMIN — Medication 125 MILLIGRAM(S): at 19:11

## 2020-01-01 RX ADMIN — Medication 50 GRAM(S): at 12:34

## 2020-01-01 RX ADMIN — Medication 125 MILLIGRAM(S): at 17:43

## 2020-01-01 RX ADMIN — Medication 125 MILLIGRAM(S): at 12:34

## 2020-01-01 RX ADMIN — Medication 2 PACKET(S): at 19:47

## 2020-01-01 RX ADMIN — Medication 100 MILLIGRAM(S): at 22:27

## 2020-01-01 RX ADMIN — Medication 50 MICROGRAM(S): at 05:36

## 2020-01-01 RX ADMIN — Medication 125 MILLIGRAM(S): at 23:09

## 2020-01-01 RX ADMIN — Medication 40 MILLIGRAM(S): at 17:32

## 2020-01-01 RX ADMIN — Medication 40 MILLIGRAM(S): at 05:22

## 2020-01-01 RX ADMIN — Medication 50 MICROGRAM(S): at 06:02

## 2020-01-01 RX ADMIN — Medication 4 PACKET(S): at 12:22

## 2020-01-01 RX ADMIN — Medication 650 MILLIGRAM(S): at 22:46

## 2020-01-01 RX ADMIN — Medication 125 MILLIGRAM(S): at 17:11

## 2020-01-01 RX ADMIN — Medication 40 MILLIGRAM(S): at 17:57

## 2020-01-01 RX ADMIN — Medication 50 MICROGRAM(S): at 06:14

## 2020-01-01 RX ADMIN — Medication 100 MILLIGRAM(S): at 20:57

## 2020-01-01 RX ADMIN — Medication 125 MILLIGRAM(S): at 12:23

## 2020-01-01 RX ADMIN — ATORVASTATIN CALCIUM 20 MILLIGRAM(S): 80 TABLET, FILM COATED ORAL at 22:35

## 2020-01-01 RX ADMIN — ENOXAPARIN SODIUM 40 MILLIGRAM(S): 100 INJECTION SUBCUTANEOUS at 11:48

## 2020-01-01 RX ADMIN — Medication 100 MILLIGRAM(S): at 12:26

## 2020-01-01 RX ADMIN — ATORVASTATIN CALCIUM 20 MILLIGRAM(S): 80 TABLET, FILM COATED ORAL at 21:42

## 2020-01-01 RX ADMIN — Medication 40 MILLIGRAM(S): at 06:02

## 2020-01-01 RX ADMIN — Medication 125 MILLIGRAM(S): at 23:11

## 2020-01-01 RX ADMIN — POTASSIUM PHOSPHATE, MONOBASIC POTASSIUM PHOSPHATE, DIBASIC 62.5 MILLIMOLE(S): 236; 224 INJECTION, SOLUTION INTRAVENOUS at 08:50

## 2020-01-01 RX ADMIN — LOSARTAN POTASSIUM 50 MILLIGRAM(S): 100 TABLET, FILM COATED ORAL at 05:05

## 2020-01-01 RX ADMIN — ATORVASTATIN CALCIUM 20 MILLIGRAM(S): 80 TABLET, FILM COATED ORAL at 21:46

## 2020-01-01 RX ADMIN — ENOXAPARIN SODIUM 40 MILLIGRAM(S): 100 INJECTION SUBCUTANEOUS at 12:26

## 2020-01-01 RX ADMIN — Medication 125 MILLIGRAM(S): at 22:27

## 2020-01-01 RX ADMIN — Medication 100 MILLIGRAM(S): at 19:08

## 2020-01-01 RX ADMIN — ATORVASTATIN CALCIUM 20 MILLIGRAM(S): 80 TABLET, FILM COATED ORAL at 22:26

## 2020-01-01 RX ADMIN — Medication 40 MILLIGRAM(S): at 18:17

## 2020-01-01 RX ADMIN — Medication 650 MILLIGRAM(S): at 22:27

## 2020-01-01 RX ADMIN — Medication 100 MILLIGRAM(S): at 00:03

## 2020-01-01 RX ADMIN — Medication 40 MILLIEQUIVALENT(S): at 18:34

## 2020-01-01 RX ADMIN — Medication 40 MILLIGRAM(S): at 18:15

## 2020-01-01 RX ADMIN — POTASSIUM PHOSPHATE, MONOBASIC POTASSIUM PHOSPHATE, DIBASIC 83.33 MILLIMOLE(S): 236; 224 INJECTION, SOLUTION INTRAVENOUS at 14:48

## 2020-01-01 RX ADMIN — Medication 100 MILLIGRAM(S): at 13:44

## 2020-01-01 RX ADMIN — Medication 100 MILLIGRAM(S): at 05:17

## 2020-01-01 RX ADMIN — Medication 50 MICROGRAM(S): at 05:13

## 2020-01-01 RX ADMIN — Medication 20 MILLIGRAM(S): at 19:03

## 2020-01-01 RX ADMIN — Medication 100 MILLIGRAM(S): at 20:37

## 2020-01-01 RX ADMIN — ENOXAPARIN SODIUM 40 MILLIGRAM(S): 100 INJECTION SUBCUTANEOUS at 12:29

## 2020-01-01 RX ADMIN — LOSARTAN POTASSIUM 50 MILLIGRAM(S): 100 TABLET, FILM COATED ORAL at 05:36

## 2020-01-01 RX ADMIN — Medication 40 MILLIGRAM(S): at 18:33

## 2020-01-01 RX ADMIN — Medication 125 MILLIGRAM(S): at 06:47

## 2020-01-01 RX ADMIN — Medication 40 MILLIGRAM(S): at 02:13

## 2020-01-01 RX ADMIN — Medication 20 MILLIGRAM(S): at 07:36

## 2020-01-01 RX ADMIN — Medication 2 PACKET(S): at 05:50

## 2020-01-01 RX ADMIN — Medication 40 MILLIGRAM(S): at 19:57

## 2020-01-01 RX ADMIN — SODIUM CHLORIDE 1000 MILLILITER(S): 9 INJECTION INTRAMUSCULAR; INTRAVENOUS; SUBCUTANEOUS at 17:54

## 2020-01-01 RX ADMIN — Medication 125 MILLIGRAM(S): at 12:29

## 2020-01-01 RX ADMIN — Medication 40 MILLIGRAM(S): at 18:25

## 2020-01-01 RX ADMIN — Medication 100 MILLIGRAM(S): at 15:38

## 2020-01-01 RX ADMIN — Medication 125 MILLIGRAM(S): at 23:22

## 2020-01-01 RX ADMIN — BENZOCAINE AND MENTHOL 1 LOZENGE: 5; 1 LIQUID ORAL at 18:21

## 2020-01-01 RX ADMIN — Medication 40 MILLIGRAM(S): at 06:24

## 2020-01-01 RX ADMIN — SODIUM CHLORIDE 1500 MILLILITER(S): 9 INJECTION INTRAMUSCULAR; INTRAVENOUS; SUBCUTANEOUS at 19:17

## 2020-01-01 RX ADMIN — Medication 125 MILLIGRAM(S): at 18:25

## 2020-01-01 RX ADMIN — LOSARTAN POTASSIUM 50 MILLIGRAM(S): 100 TABLET, FILM COATED ORAL at 06:09

## 2020-01-01 RX ADMIN — Medication 40 MILLIGRAM(S): at 17:09

## 2020-01-01 RX ADMIN — Medication 100 MILLIGRAM(S): at 06:02

## 2020-01-01 RX ADMIN — Medication 125 MILLIGRAM(S): at 12:54

## 2020-01-01 RX ADMIN — Medication 125 MILLIGRAM(S): at 23:19

## 2020-01-01 RX ADMIN — Medication 125 MILLIGRAM(S): at 18:46

## 2020-01-01 RX ADMIN — Medication 50 MICROGRAM(S): at 05:22

## 2020-01-01 RX ADMIN — Medication 3 PACKET(S): at 10:06

## 2020-01-01 RX ADMIN — Medication 125 MILLIGRAM(S): at 13:34

## 2020-01-01 RX ADMIN — Medication 100 MILLIGRAM(S): at 20:12

## 2020-01-01 RX ADMIN — Medication 40 MILLIGRAM(S): at 05:44

## 2020-01-01 RX ADMIN — Medication 50 MICROGRAM(S): at 06:25

## 2020-01-01 RX ADMIN — Medication 100 MILLIGRAM(S): at 12:28

## 2020-01-01 RX ADMIN — ENOXAPARIN SODIUM 40 MILLIGRAM(S): 100 INJECTION SUBCUTANEOUS at 12:34

## 2020-01-01 RX ADMIN — Medication 650 MILLIGRAM(S): at 21:46

## 2020-01-01 RX ADMIN — Medication 125 MILLIGRAM(S): at 11:33

## 2020-01-01 RX ADMIN — Medication 125 MILLIGRAM(S): at 17:57

## 2020-01-01 RX ADMIN — Medication 20 MILLIGRAM(S): at 12:54

## 2020-01-01 RX ADMIN — Medication 2 PACKET(S): at 18:33

## 2020-01-01 RX ADMIN — Medication 40 MILLIEQUIVALENT(S): at 14:49

## 2020-01-01 RX ADMIN — ATORVASTATIN CALCIUM 20 MILLIGRAM(S): 80 TABLET, FILM COATED ORAL at 22:28

## 2020-01-01 RX ADMIN — ENOXAPARIN SODIUM 40 MILLIGRAM(S): 100 INJECTION SUBCUTANEOUS at 13:19

## 2020-01-01 RX ADMIN — Medication 125 MILLIGRAM(S): at 12:26

## 2020-01-01 RX ADMIN — Medication 50 MICROGRAM(S): at 06:09

## 2020-01-01 RX ADMIN — Medication 40 MILLIGRAM(S): at 06:10

## 2020-01-01 RX ADMIN — Medication 125 MILLIGRAM(S): at 23:59

## 2020-01-01 RX ADMIN — Medication 40 MILLIGRAM(S): at 16:58

## 2020-01-01 RX ADMIN — PANTOPRAZOLE SODIUM 40 MILLIGRAM(S): 20 TABLET, DELAYED RELEASE ORAL at 06:09

## 2020-01-01 RX ADMIN — LOSARTAN POTASSIUM 50 MILLIGRAM(S): 100 TABLET, FILM COATED ORAL at 05:23

## 2020-01-01 RX ADMIN — Medication 2 PACKET(S): at 06:33

## 2020-01-01 RX ADMIN — Medication 125 MILLIGRAM(S): at 18:17

## 2020-01-01 RX ADMIN — Medication 100 MILLIGRAM(S): at 22:08

## 2020-01-01 RX ADMIN — Medication 100 MILLIGRAM(S): at 12:53

## 2020-01-01 RX ADMIN — Medication 100 MILLIGRAM(S): at 03:34

## 2020-01-01 RX ADMIN — Medication 125 MILLIGRAM(S): at 06:15

## 2020-01-01 RX ADMIN — ENOXAPARIN SODIUM 40 MILLIGRAM(S): 100 INJECTION SUBCUTANEOUS at 12:54

## 2020-01-01 RX ADMIN — Medication 125 MILLIGRAM(S): at 18:33

## 2020-01-01 RX ADMIN — Medication 50 MICROGRAM(S): at 06:52

## 2020-01-01 RX ADMIN — Medication 125 MILLIGRAM(S): at 06:52

## 2020-02-26 ENCOUNTER — APPOINTMENT (OUTPATIENT)
Dept: ELECTROPHYSIOLOGY | Facility: CLINIC | Age: 85
End: 2020-02-26

## 2020-05-15 NOTE — H&P ADULT - ATTENDING COMMENTS
Pt seen and examined at bedside.  I have precepted this case with house staff and agree with resident note above and have edited it where appropriate.  85 F with noted hx admitted for UC flare. Reported intolerance of mesalamine containing medications. No bloody bm overnight, no abd pain. f/u infectious w/u, if negative can start steroids pending GI recs. colonoscopy per GI.   Care to be assumed by day hospitalist at 8 am.  This patient was assigned to me by the hospitalist in charge; my involvement in this case has consisted of the initial history, physical, chart review, and management plan.  This patient was previously unknown to me.

## 2020-05-15 NOTE — ED PROVIDER NOTE - ATTENDING CONTRIBUTION TO CARE
Attending Statement (SAMANTHA Silverio MD):    HPI: 84 y/o F with h/o UC, SVT (on sotalol), HTN, HLD, presenting with abdominal pain, nausea, vomiting and bloody diarrhea.  Patient states symptoms began 2 weeks ago with mild diarrhea, and then more severe including blood over past week; was started Tuesday on Budesonide for presumed UC flare, but has experience no improvement in diarrhea.  Today, patient began to experience nausea, NB/NB vomiting and abdominal pain (starting in the left side of abdomen and radiating across) since approximately 9AM.  Called her GI (Dr Varghese) and PCP (Dr Lee) and was advised to go to ED.    PSH: lap hysterectomy, hip surgery, tonsillectomy  PMH: SVT (on sotalol), Hypothyroidism (on levothyroxine), HTN (on losartan), HLD  Allergies: sulfa, pcn    Review of Systems:  -General: no fever or chills  -ENT: no congestion, no difficulty swallowing  -Pulmonary: no cough, no shortness of breath  -Cardiac: no chest pain, no palpitations  -Gastrointestinal: + abdominal pain, + nausea, + vomiting, and + diarrhea.  -Genitourinary: no blood or pain with urination  -Musculoskeletal: no back or neck pain  -Skin: no rashes  -Endocrine: hypothyroid  -Neurologic: No focal weakness or numbness    PSH/PMH as noted above    On Physical Exam:  General: well appearing, in NAD, speaking clearly in full sentences and without difficulty; cooperative with exam  HEENT: PERRL, MMM  Neck: no neck tenderness, no nuchal rigidity  Cardiac: normal s1, s2; RRR; no MGR  Lungs: CTABL  Abdomen: soft, nondistended; + tenderness in LLQ w/o rebound or guarding  : no bladder tenderness or distension  Skin: intact, no rash  Extremities: no peripheral edema, no gross deformities  Neuro: no gross neurologic deficits    MDM: 84 y/o F with h/o UC, SVT (on sotalol), HTN, HLD, presenting with abdominal pain, nausea, vomiting and bloody diarrhea.  Patietn afebrile, bradycardic but not hypotensive or ill appearing; has tenderness in the LLQ w/o rebound or guarding.  Concerning for UC flare, complications from UC flare such as microperf or abscess, or diverticulitis; less likely mesenteric ischemia given focal tenderness and overall presentation.  Obstruction also in differential, though less likely.  Given GI complaints, very unlikely abdominal aortic pathology.  No fever, presentation not c/w sepsis at this time, but infectious etiology not excluded; however, antibiotics deferred at this time pending additional work-up (labs and CT, consultation with GI).  Will obtain CT AP to further evaluate.  Labs sent: cbc (eval for anemia; likely to have leukocytosis given recently started budesonide), CMP (to evaluate for electrolyte abnormalities or renal/liver dysfunction), lipase (r/o pancreatitis, low suspicion), and vbg/lactate.  UA r/o UTI.  Disposition pending initial results.

## 2020-05-15 NOTE — CONSULT NOTE ADULT - ASSESSMENT
84 yo F with PMHx of ulcerative colitis, hypothyroidism, HTN, HLD who was advised to come to the ED by Dr. Varghese (GI) for abdominal pain, bloody diarrhea, intractable N/V since 5/12.    1) Bloody Diarrhea  Patient presenting with worsening abdominal pain and bloody diarrhea since 5/12 not improving with apriso or budesonide.  DDx: UC flare vs. infectious colitis  2) Nausea vomiting  Acute onset  DDx: UC flare vs. viral gastroenteritis.     - please obtain Cdiff and GI PCR panel  - please check ESR. CRP, fecal calprotectin  - please obtain CTAP  - can continue apriso for now  - once Cdiff and GI PCR panel returns, can consider starting IV steroids  - zofran PRN for N/V  - clear liquid diet for now. If patient's N/V improves, can consider advancing to BRAT/lactose free diet as tolerated  - DVT ppx  - serial CBC and transfuse for Hb<7 86 yo F with PMHx of ulcerative colitis, hypothyroidism, HTN, HLD who was advised to come to the ED by Dr. Varghese (GI) for abdominal pain, bloody diarrhea, intractable N/V since 5/12.    1) Bloody Diarrhea  Patient presenting with worsening abdominal pain and bloody diarrhea since 5/12 not improving with apriso or budesonide.  DDx: UC flare vs. infectious colitis  2) Nausea vomiting  Acute onset  DDx: UC flare vs. viral gastroenteritis.     - please obtain Cdiff and GI PCR panel  - please check ESR. CRP, fecal calprotectin  - please obtain CTAP  - can continue apriso for now  - once Cdiff and GI PCR panel returns, can consider starting IV steroids  - will consider colonsocopy during this admission  - zofran PRN for N/V  - clear liquid diet for now. If patient's N/V improves, can consider advancing to BRAT/lactose free diet as tolerated  - DVT ppx  - serial CBC and transfuse for Hb<7 84 yo F with PMHx of ulcerative colitis, hypothyroidism, HTN, HLD who was advised to come to the ED by Dr. Varghese (GI) for abdominal pain, bloody diarrhea, intractable N/V since 5/12.    1) Bloody Diarrhea  Patient presenting with worsening abdominal pain and bloody diarrhea since 5/12 not improving with apriso or budesonide.  DDx: UC flare vs. infectious colitis    2) Nausea vomiting - Acute onset  DDx: UC flare vs. viral gastroenteritis.     - please obtain Cdiff and GI PCR panel  - please check ESR. CRP, fecal calprotectin  - please obtain CTAP  - can continue apriso for now  - once Cdiff and GI PCR panel returns, can consider starting IV steroids  - will consider colonsocopy during this admission  - zofran PRN for N/V  - clear liquid diet for now. If patient's N/V improves, can consider advancing to BRAT/lactose free diet as tolerated  - DVT ppx  - serial CBC and transfuse for Hb<7

## 2020-05-15 NOTE — H&P ADULT - ASSESSMENT
85y F pmhx  ulcerative colitis, SVT on sotolol , hypothyroid, HTN, HLD p/w abdominal pain with nausea, vomiting, bloody diarrhea found to have clinical and radiographic evidence of colitis, differential includes infectious vs inflammatory in origin, work up in progress. GI following

## 2020-05-15 NOTE — H&P ADULT - NSHPPHYSICALEXAM_GEN_ALL_CORE
T(C): 37.2 (05-15-20 @ 22:35), Max: 37.2 (05-15-20 @ 22:35)  HR: 66 (05-15-20 @ 22:35) (51 - 66)  BP: 117/74 (05-15-20 @ 22:35) (117/74 - 144/59)  RR: 18 (05-15-20 @ 22:35) (18 - 22)  SpO2: 99% (05-15-20 @ 22:35) (99% - 99%)  PHYSICAL EXAM:  GENERAL: NAD, well-developed  HEAD:  Atraumatic, Normocephalic  EYES: EOMI, PERRLA, conjunctiva and sclera clear  NECK: Supple, No JVD  CHEST/LUNG: Clear to auscultation bilaterally; No wheeze  HEART: Regular rate and rhythm; No murmurs, rubs, or gallops  ABDOMEN: Soft, Nontender, Nondistended; Bowel sounds present  EXTREMITIES:, No clubbing, cyanosis, or edema  PSYCH: AAOx3  NEUROLOGY: non-focal  SKIN: No rashes or lesions T(C): 37.2 (05-15-20 @ 22:35), Max: 37.2 (05-15-20 @ 22:35)  HR: 66 (05-15-20 @ 22:35) (51 - 66)  BP: 117/74 (05-15-20 @ 22:35) (117/74 - 144/59)  RR: 18 (05-15-20 @ 22:35) (18 - 22)  SpO2: 99% (05-15-20 @ 22:35) (99% - 99%)  PHYSICAL EXAM:  GENERAL: NAD, well-developed  HEAD:  Atraumatic, Normocephalic  EYES: EOMI, PERRLA, conjunctiva and sclera clear  NECK: Supple, No JVD  CHEST/LUNG: Clear to auscultation bilaterally; No wheeze  HEART: Regular rate and rhythm; No murmurs, rubs, or gallops  ABDOMEN: Soft, Tender LUQ with no guarding or rebound tenderness, + bowel sounds   EXTREMITIES:, No clubbing, cyanosis, or edema  PSYCH: AAOx3  NEUROLOGY: non-focal  SKIN: No rashes or lesions T(C): 37.2 (05-15-20 @ 22:35), Max: 37.2 (05-15-20 @ 22:35)  HR: 66 (05-15-20 @ 22:35) (51 - 66)  BP: 117/74 (05-15-20 @ 22:35) (117/74 - 144/59)  RR: 18 (05-15-20 @ 22:35) (18 - 22)  SpO2: 99% (05-15-20 @ 22:35) (99% - 99%)  PHYSICAL EXAM:  GENERAL: NAD, well-developed  HEAD:  Atraumatic, Normocephalic  EYES: EOMI, PERRLA, conjunctiva and sclera clear  NECK: Supple, No JVD  CHEST/LUNG: Clear to auscultation bilaterally; No wheeze  HEART: Regular rate and rhythm; No murmurs, rubs, or gallops no sig Le edema  ABDOMEN: Soft, Tender LUQ with no guarding or rebound tenderness, + bowel sounds   EXTREMITIES:, No clubbing, cyanosis, or edema  PSYCH: AAOx3 no si no hi normal affect  NEUROLOGY: non-focal motor and sensation grossly intact b/l ue/le, no facial droop  SKIN: No rashes or lesions

## 2020-05-15 NOTE — H&P ADULT - NSICDXPASTSURGICALHX_GEN_ALL_CORE_FT
PAST SURGICAL HISTORY:  Elective surgery Pilonidal cystectomy    H/O: Hysterectomy     S/P D&C (status post dilation and curettage)     S/P tonsillectomy

## 2020-05-15 NOTE — H&P ADULT - HISTORY OF PRESENT ILLNESS
85y F pmhx  ulcerative colitis, SVT, hypothyroid, HTN, HLD p/w abdominal pain with nausea, vomiting, bloody diarrhea. Bloody diarrhea  has been going on daily for 2 weeks, followed outpatient by Dr. Eri Beltran who had prescribed budesonide on Tuesday (5/12) for UC flare without relief. Called Dr. Beltran and PCP Dr. Lee due to sudden onset nausea with non-bloody vomiting since 9AM this morning and advised to come to ED, nausea since resolved. Patient typically takes Apriso for UC since 2018, but has required occasional Budesonide for mild UC flare . Did not experience any symptomatic improvement after  budesonide 9mg. Patient last colonoscopy on 7/12 with polypectomy at that time with pseudopolypms noted in the sigmoid colon which was biopsied. Patient noted to have diverticulosis in the colon. Patient has had a history of laparoscopic hysterectomy. Pt denies dizziness, cp, palpitations, bloody urine or urinary complaints, LE pain or swelling different from baseline (end of day swelling on budesonide). Pt denies desire for pain control.    VS in the ED: 144/59, HR 51, RR 19, T 98.1 breathing 99 % on RA 85y F pmhx  ulcerative colitis, SVT on sotolol , hypothyroid, HTN, HLD p/w abdominal pain with nausea, vomiting, bloody diarrhea. notes that she has had bloody diarrhea for the past 3 weeks. She states that she had a major life stressor one year ago and has had on and off UC flare since. Prior to that did , followed outpatient by Dr. Eri Beltran who had prescribed budesonide on Tuesday (5/12) for UC flare without relief. Called Dr. Beltran and PCP Dr. Lee due to sudden onset nausea with non-bloody vomiting since 9AM this morning and advised to come to ED, nausea since resolved. Patient typically takes Apriso for UC since 2018, but has required occasional Budesonide for mild UC flare . Did not experience any symptomatic improvement after  budesonide 9mg. Patient last colonoscopy on 7/12 with polypectomy at that time with pseudopolypms noted in the sigmoid colon which was biopsied. Patient noted to have diverticulosis in the colon. Patient has had a history of laparoscopic hysterectomy. Pt denies dizziness, cp, palpitations, bloody urine or urinary complaints, LE pain or swelling different from baseline (end of day swelling on budesonide). Pt denies desire for pain control.    VS in the ED: 144/59, HR 51, RR 19, T 98.1 breathing 99 % on RA 85y F pmhx  ulcerative colitis, SVT on sotolol , hypothyroid, HTN, HLD p/w abdominal pain with nausea, vomiting, bloody diarrhea. notes that she has had bloody diarrhea for the past 3 weeks. She states that she had a major life stressor one year ago and has had on and off UC flare since. Prior to that her life was not  significant impacted from UC. She states 4 months ago was started on budesonide that was tapered and stopped 1 month ago.  She is followed outpatient by Dr. Eri Beltran who had prescribed budesonide  once more on Tuesday (5/12) for UC flare without relief. Called Dr. Beltran and PCP Dr. Lee due to sudden onset nausea with non-bloody non bilious clear emesis that was recurrent since 9AM this morning and advised to come to ED, nausea since resolved. States that she experienced a sharp LUQ pain.  She typically takes Apriso for UC since 2018, but has required occasional Budesonide for mild UC flare . Did not tolerate mesalamine po in the past.  Did not experience any symptomatic improvement after budesonide 9mg. Patient last colonoscopy on 7/12 with polypectomy at that time with pseudopolyps noted in the sigmoid colon which was biopsied. Patient noted to have diverticulosis in the colon. Patient has had a history of laparoscopic hysterectomy. Pt denies dizziness, cp, palpitations, bloody urine or urinary complaints, LE pain or swelling different from baseline (end of day swelling on budesonide). Pt denies desire for pain control. She notes her allergies to keflex and PCN were tested by an allergist and not be true allergies .     VS in the ED: 144/59, HR 51, RR 19, T 98.1 breathing 99 % on RA

## 2020-05-15 NOTE — H&P ADULT - NSHPLABSRESULTS_GEN_ALL_CORE
11.6   15.95 )-----------( 288      ( 15 May 2020 16:19 )             38.0   05-15    145  |  106  |  27<H>  ----------------------------<  131<H>  3.9   |  25  |  0.83    Ca    9.1      15 May 2020 16:19    TPro  7.3  /  Alb  4.1  /  TBili  0.5  /  DBili  x   /  AST  18  /  ALT  15  /  AlkPhos  62  05-15    CT Abdomen and Pelvis w/ Oral Cont and w/ IV Cont :    IMPRESSION:     Colitis of the descending and proximal sigmoid colon.. 11.6   15.95 )-----------( 288      ( 15 May 2020 16:19 )             38.0   05-15    145  |  106  |  27<H>  ----------------------------<  131<H>  3.9   |  25  |  0.83    Ca    9.1      15 May 2020 16:19    TPro  7.3  /  Alb  4.1  /  TBili  0.5  /  DBili  x   /  AST  18  /  ALT  15  /  AlkPhos  62  05-15    CT Abdomen and Pelvis w/ Oral Cont and w/ IV Cont :    IMPRESSION:     Colitis of the descending and proximal sigmoid colon..    labs, imaging ,ekg personally reviewed

## 2020-05-15 NOTE — ED PROVIDER NOTE - PROGRESS NOTE DETAILS
Elizabeth Goldberger PGY-3: pt signed out to me pending CT read. S/f colitis, no acute surgical pathology. Will admit to medicine for UC flare, GI following Attending MD Ledesma: Colitis on CT, no acute surgical pathology noted.  Medicine admission.  Stable for transfer to the floors.

## 2020-05-15 NOTE — H&P ADULT - NSHPSOCIALHISTORY_GEN_ALL_CORE
Patient denies smoking , alcohol and illicit drugs. Previously employed in insurance company with  retired currently

## 2020-05-15 NOTE — H&P ADULT - NSHPREVIEWOFSYSTEMS_GEN_ALL_CORE
REVIEW OF SYSTEMS:  CONSTITUTIONAL: No weakness, fevers or chills  EYES/ENT: No visual changes;  No vertigo or throat pain   NECK: No pain or stiffness  RESPIRATORY: No cough, wheezing, hemoptysis; No shortness of breath  CARDIOVASCULAR: No chest pain or palpitations  GASTROINTESTINAL: No abdominal or epigastric pain. No nausea, vomiting, or hematemesis; No diarrhea or constipation. No melena or hematochezia.  GENITOURINARY: No dysuria, frequency or hematuria  NEUROLOGICAL: No numbness or weakness  SKIN: No itching, burning, rashes, or lesions   All other review of systems is negative unless indicated above. REVIEW OF SYSTEMS:  CONSTITUTIONAL: No weakness, fevers or chills  EYES/ENT: No visual changes;  No vertigo or throat pain   NECK: No pain or stiffness  RESPIRATORY: No cough, wheezing, hemoptysis; No shortness of breath  CARDIOVASCULAR: No chest pain or palpitations  GASTROINTESTINAL: + emesis, + bloody stools, + nausea   GENITOURINARY: No dysuria, frequency or hematuria  NEUROLOGICAL: No numbness or weakness  SKIN: No itching, burning, rashes, or lesions   All other review of systems is negative unless indicated above.

## 2020-05-15 NOTE — ED ADULT NURSE NOTE - PSH
Elective surgery  Pilonidal cystectomy  H/O: Hysterectomy    S/P D&C (status post dilation and curettage)    S/P tonsillectomy

## 2020-05-15 NOTE — ED PROVIDER NOTE - SHIFT CHANGE DETAILS
Attending MD Ledesma: 85F with PMH UC, s/p lap hyst, diarrhea for few days, budesonide by GI for UC, LLQ pain today with n/v. +LLQ ttp.  Afebrile.  CTAP and labs pending.  Seen by GI.  Pending labs, CTAP and final recs by GI. Repeat Lac after IVFs.

## 2020-05-15 NOTE — ED PROVIDER NOTE - OBJECTIVE STATEMENT
85y F pmhx colitis, SVT, hypothyroid, HTN, HLD p/w abdominal pain with nausea, vomiting, bloody diarrhea. Bloody diarrhea daily for 2 weeks, followed outpatient by Dr. Eri Beltran, prescribed budesonide on Tuesday (5/12) for UC flare without relief. Called Dr. Beltran and PCP Dr. Lee due to sudden onset nausea with non-bloody vomiting since 9AM this morning and advised to come to ED, nausea since resolved. Hx of laparoscopic hysterectomy. Pt denies dizziness, cp, palpitations, bloody urine or urinary complaints, LE pain or swelling different from baseline (end of day swelling on budesonide). Pt denies desire for pain control.  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

## 2020-05-15 NOTE — H&P ADULT - PROBLEM SELECTOR PLAN 2
C/W Sotolol 40mg BID   EKG obtained  Consider EP consult in AM as patient lost to follow up in pandemic and patients EP doctor recently moved out of [facility]

## 2020-05-15 NOTE — ED PROVIDER NOTE - PMH
Atrial Tachycardia  SVT on Sotalol  Colitis    Hypercholesteremia    Hypertension, Essential    Hypothyroid    Lipoma of neck

## 2020-05-15 NOTE — ED PROVIDER NOTE - PHYSICAL EXAMINATION
GEN: Pt in NAD, A&Ox3.  PSYCH: Affect appropriate.  EYES: Sclera pale w/o injection, PERRLA.   ENT: Air way patent. Mucus membranes, tongue pale. Neck supple FROM.   RESP: No chest wall tenderness, CTA b/l, no wheezes, rales, or rhonchi.   CARDIAC: Irregular rhythm, HR 52, clear distinct S1, S2, no appreciable murmurs.  ABD: Abdomen soft, no guarding or rebound tenderness. Tenderness to palpation b/l lower quadrants. No CVAT b/l.  VASC: Radial and dorsalis pedis pulses 2+ b/l. No edema or calf tenderness.  SKIN: Skin pale and cool to touch. No rashes or lesions.

## 2020-05-15 NOTE — CONSULT NOTE ADULT - SUBJECTIVE AND OBJECTIVE BOX
Chief Complaint:  Patient is a 85y old  Female who presents with a chief complaint of     HPI:  84 yo F with PMHx of ulcerative colitis, hypothyroidism, HTN, HLD who was advised to come to the ED by Dr. Varghese (GI) for abdominal pain, bloody diarrhea, intractable N/V since 5/12.    Patient has been seeing Dr. Varghese since 2018. She has been taking Apriso for her UC. Since 2018, she has remained on apriso but has required occasional budesonide for mild UC flare.    On 5/12 patient was prescribed budesonide 9mg but her symptoms did not improve. She called Dr. Varghese to report that her abdominal pain and rectal bleeding was still ongoing and she was also developing N/V as well. Patient was advised to come to the ED.    Allergies:  amoxicillin (Other)  Keflex (Other)  penicillin (Unknown)  sulfa drugs (Unknown)      Home Medications:    Hospital Medications:      PMHX/PSHX:  Colitis  Atrial fibrillation with rapid ventricular response  Hypothyroid  SVT (supraventricular tachycardia)  Lipoma of neck  Atrial Tachycardia  Hypercholesteremia  Hypertension, Essential  Elective surgery  S/P tonsillectomy  S/P D&C (status post dilation and curettage)  H/O: Hysterectomy      Family history:  Family history of Parkinson's disease (Sibling)  Family history of CVA (Father)  Family history of renal failure (Mother)      Social History:     ROS:     General:  No wt loss, fevers, chills, night sweats, fatigue,   Eyes:  Good vision, no reported pain  ENT:  No sore throat, pain, runny nose, dysphagia  CV:  No pain, palpitations, hypo/hypertension  Resp:  No dyspnea, cough, tachypnea, wheezing  GI:  See HPI  :  No pain, bleeding, incontinence, nocturia  Muscle:  No pain, weakness  Neuro:  No weakness, tingling, memory problems  Psych:  No fatigue, insomnia, mood problems, depression  Endocrine:  No polyuria, polydipsia, cold/heat intolerance  Heme:  No petechiae, ecchymosis, easy bruisability  Skin:  No rash, edema      PHYSICAL EXAM:     GENERAL:  Appears stated age, well-groomed, well-nourished, no distress  HEENT:  NC/AT,  conjunctivae clear and pink,  no JVD  CHEST:  Full & symmetric excursion, no increased effort, breath sounds clear  HEART:  Regular rhythm, S1, S2, no murmur/rub/S3/S4, no abdominal bruit, no edema  ABDOMEN:  Soft, non-tender, non-distended, normoactive bowel sounds,  no masses ,  EXTREMITIES:  no cyanosis,clubbing or edema  SKIN:  No rash/erythema/ecchymoses/petechiae/wounds/abscess/warm/dry  NEURO:  Alert, oriented    Vital Signs:  Vital Signs Last 24 Hrs  T(C): 36.7 (15 May 2020 14:19), Max: 36.7 (15 May 2020 14:19)  T(F): 98.1 (15 May 2020 14:19), Max: 98.1 (15 May 2020 14:19)  HR: 51 (15 May 2020 14:19) (51 - 51)  BP: 144/59 (15 May 2020 14:19) (144/59 - 144/59)  BP(mean): --  RR: 19 (15 May 2020 14:19) (19 - 19)  SpO2: 99% (15 May 2020 14:19) (99% - 99%)  Daily Height in cm: 152.4 (15 May 2020 14:19)    Daily     LABS:                    Imaging: Chief Complaint:  Patient is a 85y old  Female who presents with a chief complaint of     HPI:  84 yo F with PMHx of ulcerative colitis, hypothyroidism, HTN, HLD who was advised to come to the ED by Dr. Varghese (GI) for abdominal pain, bloody diarrhea, intractable N/V since 5/12.    Patient has been seeing Dr. Varghese for management of her UC. She has been taking Apriso for her UC. Since 2018, she has remained on apriso but has required occasional budesonide for mild UC flare.    On 5/12 patient was prescribed budesonide 9mg but her symptoms did not improve. She called Dr. Varghese to report that her abdominal pain and rectal bleeding was still ongoing and she was also developing N/V as well. Patient was advised to come to the ED.    Last time this patient had colonoscopy was 7/2012   - One 2 mm polyp in the ascending colon (adenomatous polyp). Resected and retrieved.  - Pseudopolyps in the sigmoid colon. This was biopsied.  - Diverticulosis in the sigmoid colon.  - The examination was otherwise normal.  - Background biopsies were taken from the ascending colon, transverse colon,                        descending colon and rectum.    Allergies:  amoxicillin (Other)  Keflex (Other)  penicillin (Unknown)  sulfa drugs (Unknown)      Home Medications:    Hospital Medications:      PMHX/PSHX:  Colitis  Atrial fibrillation with rapid ventricular response  Hypothyroid  SVT (supraventricular tachycardia)  Lipoma of neck  Atrial Tachycardia  Hypercholesteremia  Hypertension, Essential  Elective surgery  S/P tonsillectomy  S/P D&C (status post dilation and curettage)  H/O: Hysterectomy      Family history:  Family history of Parkinson's disease (Sibling)  Family history of CVA (Father)  Family history of renal failure (Mother)      Social History:     ROS:     General:  No wt loss, fevers, chills, night sweats, fatigue,   Eyes:  Good vision, no reported pain  ENT:  No sore throat, pain, runny nose, dysphagia  CV:  No pain, palpitations, hypo/hypertension  Resp:  No dyspnea, cough, tachypnea, wheezing  GI:  See HPI  :  No pain, bleeding, incontinence, nocturia  Muscle:  No pain, weakness  Neuro:  No weakness, tingling, memory problems  Psych:  No fatigue, insomnia, mood problems, depression  Endocrine:  No polyuria, polydipsia, cold/heat intolerance  Heme:  No petechiae, ecchymosis, easy bruisability  Skin:  No rash, edema      PHYSICAL EXAM:     GENERAL:  Appears stated age, well-groomed, well-nourished, no distress  HEENT:  NC/AT,  conjunctivae clear and pink,  no JVD  CHEST:  Full & symmetric excursion, no increased effort, breath sounds clear  HEART:  Regular rhythm, S1, S2, no murmur/rub/S3/S4, no abdominal bruit, no edema  ABDOMEN:  Soft, non-tender, non-distended, normoactive bowel sounds,  no masses ,  EXTREMITIES:  no cyanosis,clubbing or edema  SKIN:  No rash/erythema/ecchymoses/petechiae/wounds/abscess/warm/dry  NEURO:  Alert, oriented    Vital Signs:  Vital Signs Last 24 Hrs  T(C): 36.7 (15 May 2020 14:19), Max: 36.7 (15 May 2020 14:19)  T(F): 98.1 (15 May 2020 14:19), Max: 98.1 (15 May 2020 14:19)  HR: 51 (15 May 2020 14:19) (51 - 51)  BP: 144/59 (15 May 2020 14:19) (144/59 - 144/59)  BP(mean): --  RR: 19 (15 May 2020 14:19) (19 - 19)  SpO2: 99% (15 May 2020 14:19) (99% - 99%)  Daily Height in cm: 152.4 (15 May 2020 14:19)    Daily     LABS:                    Imaging:

## 2020-05-15 NOTE — H&P ADULT - PROBLEM SELECTOR PLAN 1
Patient with UC flare noted by bloody stools X 3 weeks, abdominal pain to palpation on physical exam and radiographic evidence on CT  Differential includes UC flare vs infectious etiology  CT with findings of: Colitis of the descending and proximal sigmoid colon.  GI consulted: with recommendations  to check  Cdiff and GI PCR panel  - f/u ESR. CRP, fecal calprotectin  - c/w apriso per GI however pharmacy does not have this medication in stock can trial 1.6 grams divided into 2-4 doses vs patient bringing in home meds while inpatient   - once Cdiff and GI PCR panel returns, can consider starting IV steroids  - GI considering colonoscopy during this admission  - Zofran PRN for N/V, EKG to allow for QTC   - clear liquid diet for now. If patient's N/V improves, can consider advancing to BRAT/lactose free diet as tolerated  - serial CBC and transfuse for Hb<7 Patient with UC flare noted by bloody stools X 3 weeks, abdominal pain to palpation on physical exam and radiographic evidence on CT  Differential includes UC flare vs infectious etiology  CT with findings of: Colitis of the descending and proximal sigmoid colon.  GI consulted: with recommendations  to check  Cdiff and GI PCR panel  - f/u ESR. CRP, fecal calprotectin  - c/w apriso per GI however pharmacy does not have this medication in stock can trial 1.6 grams divided into 2-4 doses vs patient bringing in home meds while inpatient. Patient does not want to take apriso or mesalamine because profuse diarrhea. Will need to discuss with gastroenterology about alternative agents that could be offered.   - once Cdiff and GI PCR panel returns, can consider starting IV steroids  - GI considering colonoscopy during this admission  - Zofran PRN for N/V, EKG to allow for QTC   - clear liquid diet for now. If patient's N/V improves, can consider advancing to BRAT/lactose free diet as tolerated  - serial CBC and transfuse for Hb<7

## 2020-05-15 NOTE — ED ADULT NURSE REASSESSMENT NOTE - NS ED NURSE REASSESS COMMENT FT1
Report received from MAXIMILIANO Luna. Pt is resting comfortably in bed. Pt A&Ox4. Pt informed of POC and awaiting a bed upstairs. Pt has no complaints at this time. Pt safety maintained.

## 2020-05-15 NOTE — ED ADULT NURSE NOTE - NSIMPLEMENTINTERV_GEN_ALL_ED
Implemented All Fall Risk Interventions:  [place] to call system. Call bell, personal items and telephone within reach. Instruct patient to call for assistance. Room bathroom lighting operational. Non-slip footwear when patient is off stretcher. Physically safe environment: no spills, clutter or unnecessary equipment. Stretcher in lowest position, wheels locked, appropriate side rails in place. Provide visual cue, wrist band, yellow gown, etc. Monitor gait and stability. Monitor for mental status changes and reorient to person, place, and time. Review medications for side effects contributing to fall risk. Reinforce activity limits and safety measures with patient and family.

## 2020-05-15 NOTE — H&P ADULT - NSICDXFAMILYHX_GEN_ALL_CORE_FT
FAMILY HISTORY:  Family history of CVA, DM  Family history of renal failure, HTN    Sibling  Still living? Yes, Estimated age: 71-80  Family history of Parkinson's disease, Age at diagnosis: Age Unknown

## 2020-05-15 NOTE — ED ADULT NURSE REASSESSMENT NOTE - NS ED NURSE REASSESS COMMENT FT1
Updated pt that she is still awaiting a bed upstairs; pt verbalized understanding. Pt sitting comfortably reading her book with no complaints. Will continue to reassess.

## 2020-05-15 NOTE — H&P ADULT - NSICDXPASTMEDICALHX_GEN_ALL_CORE_FT
PAST MEDICAL HISTORY:  Atrial Tachycardia SVT on Sotalol    Colitis     Hypercholesteremia     Hypertension, Essential     Hypothyroid     Lipoma of neck

## 2020-05-16 NOTE — PROGRESS NOTE ADULT - ASSESSMENT
85F pmhx ulcerative colitis, SVT on sotolol, hypothyroid, HTN, HLD p/w colitis flare confirmed on CT- infectious vs inflammatory

## 2020-05-16 NOTE — PROGRESS NOTE ADULT - SUBJECTIVE AND OBJECTIVE BOX
Carlos Rebolledo MD  Pager [phone] [place] [phone]  Cell Phone [phone]    Patient is a 85y old  Female who presents with a chief complaint of UC flare (16 May 2020 05:05)        SUBJECTIVE / OVERNIGHT EVENTS: Patient feels much improved today. Still having diarrhea with some blood. Abd pain has improved      MEDICATIONS  (STANDING):  atorvastatin 20 milliGRAM(s) Oral at bedtime  enoxaparin Injectable 40 milliGRAM(s) SubCutaneous daily  levothyroxine 50 MICROGram(s) Oral daily  losartan 50 milliGRAM(s) Oral daily  sotalol 40 milliGRAM(s) Oral two times a day    MEDICATIONS  (PRN):  ondansetron Injectable 4 milliGRAM(s) IV Push every 6 hours PRN Nausea and/or Vomiting      Vital Signs Last 24 Hrs  T(C): 36.4 (16 May 2020 09:21), Max: 37.2 (15 May 2020 22:35)  T(F): 97.6 (16 May 2020 09:21), Max: 98.9 (15 May 2020 22:35)  HR: 58 (16 May 2020 09:21) (51 - 66)  BP: 103/66 (16 May 2020 09:21) (103/66 - 144/59)  BP(mean): --  RR: 18 (16 May 2020 09:21) (18 - 22)  SpO2: 97% (16 May 2020 09:21) (95% - 99%)  CAPILLARY BLOOD GLUCOSE        I&O's Summary    15 May 2020 07:  -  16 May 2020 07:00  --------------------------------------------------------  IN: 120 mL / OUT: 0 mL / NET: 120 mL    16 May 2020 07:  -  16 May 2020 13:54  --------------------------------------------------------  IN: 960 mL / OUT: 1 mL / NET: 959 mL          PHYSICAL EXAM  GENERAL: NAD, well-developed  HEAD:  Atraumatic, Normocephalic  EYES: EOMI, PERRLA, conjunctiva and sclera clear  CHEST/LUNG: Clear to auscultation bilaterally; No wheeze  HEART: Regular rate and rhythm; No murmurs, rubs, or gallops  ABDOMEN: Soft, mild left sided tenderness to palpation, nondistended; Bowel sounds present  EXTREMITIES:  2+ Peripheral Pulses, No clubbing, cyanosis; trace pedal edema  PSYCH: AAOx3      LABS:                        10.2   11.84 )-----------( 211      ( 16 May 2020 06:23 )             32.8     05-16    139  |  106  |  19  ----------------------------<  82  3.8   |  20<L>  |  0.65    Ca    8.0<L>      16 May 2020 06:23  Phos  2.9     -  Mg     2.0         TPro  6.0  /  Alb  3.3  /  TBili  0.6  /  DBili  x   /  AST  25  /  ALT  12  /  AlkPhos  53  05-16    PT/INR - ( 15 May 2020 16:19 )   PT: 11.0 sec;   INR: 0.97 ratio         PTT - ( 15 May 2020 16:19 )  PTT:25.9 sec      Urinalysis Basic - ( 15 May 2020 17:57 )    Color: Yellow / Appearance: Clear / S.022 / pH: x  Gluc: x / Ketone: Small  / Bili: Negative / Urobili: Negative   Blood: x / Protein: Trace / Nitrite: Negative   Leuk Esterase: Negative / RBC: 1 /hpf / WBC 2 /HPF   Sq Epi: x / Non Sq Epi: 3 / Bacteria: Negative          RADIOLOGY & ADDITIONAL TESTS:    Imaging Personally Reviewed:  Consultant(s) Notes Reviewed:    Care Discussed with Consultants/Other Providers:

## 2020-05-16 NOTE — PROGRESS NOTE ADULT - SUBJECTIVE AND OBJECTIVE BOX
Chief Complaint: Bloody diarrhea    Interval Events:   - No acute events overnight    Allergies:  amoxicillin (Other)  Keflex (Other)  penicillin (Unknown)  sulfa drugs (Unknown)    Hospital Medications:  atorvastatin 20 milliGRAM(s) Oral at bedtime  enoxaparin Injectable 40 milliGRAM(s) SubCutaneous daily  levothyroxine 50 MICROGram(s) Oral daily  losartan 50 milliGRAM(s) Oral daily  ondansetron Injectable 4 milliGRAM(s) IV Push every 6 hours PRN  sotalol 40 milliGRAM(s) Oral two times a day    PMHX/PSHX:  Colitis  Atrial fibrillation with rapid ventricular response  Hypothyroid  SVT (supraventricular tachycardia)  Lipoma of neck  Atrial Tachycardia  Hypercholesteremia  Hypertension, Essential  Elective surgery  S/P tonsillectomy  S/P D&C (status post dilation and curettage)  H/O: Hysterectomy    Family history:  Family history of Parkinson's disease (Sibling)  Family history of CVA  Family history of renal failure    ROS:     General:  No wt loss, fevers, chills, night sweats, fatigue,   Eyes:  Good vision, no reported pain  ENT:  No sore throat, pain, runny nose, dysphagia  CV:  No pain, palpitations, hypo/hypertension  Pulm:  No dyspnea, cough, tachypnea, wheezing  GI:  No pain, No nausea, No vomiting, No diarrhea, No constipation, No weight loss, No fever, No pruritis, No rectal bleeding, No tarry stools, No dysphagia  :  No pain, bleeding, incontinence, nocturia  Muscle:  No pain, weakness  Neuro:  No weakness, tingling, memory problems  Psych:  No fatigue, insomnia, mood problems, depression  Endocrine:  No polyuria, polydipsia, cold/heat intolerance  Heme:  No petechiae, ecchymosis, easy bruisability  Skin:  No rash, tattoos, scars, edema    PHYSICAL EXAM:   Vital Signs:  Vital Signs Last 24 Hrs  T(C): 36.9 (16 May 2020 04:21), Max: 37.2 (15 May 2020 22:35)  T(F): 98.4 (16 May 2020 04:21), Max: 98.9 (15 May 2020 22:35)  HR: 59 (16 May 2020 04:21) (51 - 66)  BP: 116/57 (16 May 2020 04:21) (116/57 - 144/59)  BP(mean): --  RR: 18 (16 May 2020 04:21) (18 - 22)  SpO2: 95% (16 May 2020 04:21) (95% - 99%)  Daily Height in cm: 152.4 (15 May 2020 14:19)      GENERAL:  No acute distress  HEENT:  Normocephalic/atraumatic,  no scleral icterus  CHEST:  Normal effort, no accessory muscle use  ABDOMEN:  Soft, + tender, mildly distended, normoactive bowel sounds  EXTREMITIES:  No cyanosis, clubbing  NEURO:  Alert and oriented x 3    LABS:                        11.6   15.95 )-----------( 288      ( 15 May 2020 16:19 )             38.0     Mean Cell Volume: 89.6 fl (05-15-20 @ 16:19)    05-15    145  |  106  |  27<H>  ----------------------------<  131<H>  3.9   |  25  |  0.83    Ca    9.1      15 May 2020 16:19    TPro  7.3  /  Alb  4.1  /  TBili  0.5  /  DBili  x   /  AST  18  /  ALT  15  /  AlkPhos  62  05-15    LIVER FUNCTIONS - ( 15 May 2020 16:19 )  Alb: 4.1 g/dL / Pro: 7.3 g/dL / ALK PHOS: 62 U/L / ALT: 15 U/L / AST: 18 U/L / GGT: x           PT/INR - ( 15 May 2020 16:19 )   PT: 11.0 sec;   INR: 0.97 ratio         PTT - ( 15 May 2020 16:19 )  PTT:25.9 sec  Urinalysis Basic - ( 15 May 2020 17:57 )    Color: Yellow / Appearance: Clear / S.022 / pH: x  Gluc: x / Ketone: Small  / Bili: Negative / Urobili: Negative   Blood: x / Protein: Trace / Nitrite: Negative   Leuk Esterase: Negative / RBC: 1 /hpf / WBC 2 /HPF   Sq Epi: x / Non Sq Epi: 3 / Bacteria: Negative      Amylase Serum--      Lipase serum30       Ammonia--                          11.6   15.95 )-----------( 288      ( 15 May 2020 16:19 )             38.0       Imaging:    < from: CT Abdomen and Pelvis w/ Oral Cont and w/ IV Cont (05.15.20 @ 20:01) >    EXAM:  CT ABDOMEN AND PELVIS OC IC                            PROCEDURE DATE:  05/15/2020            INTERPRETATION:  CLINICAL INFORMATION: Ulcerative colitis flare. Abdominal pain, nausea/vomiting, bloody diarrhea    COMPARISON: CT abdomen and pelvis 10/8/2008    PROCEDURE:   CT of the Abdomen and Pelvis was performed with intravenous contrast.   Intravenous contrast: 90 ml Omnipaque 350. 10 ml discarded.  Oral contrast: positive contrast was administered.  Sagittal and coronal reformats were performed.    FINDINGS:    LOWER CHEST: Mild bibasilar linear atelectasis. Coronary artery calcifications.    LIVER: Subcentimeter right hepatic cyst.  BILE DUCTS: Normal caliber.  GALLBLADDER: Within normal limits.  SPLEEN: Within normal limits.  PANCREAS: Redemonstrated pancreatic divisum.  ADRENALS: Within normal limits.  KIDNEYS/URETERS: Renal cysts including parapelvic cysts. A few hypodense renal foci too small to characterize. 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    BLADDER: Within normal limits.  REPRODUCTIVE ORGANS: Hysterectomy.    BOWEL: Small hiatal hernia. No bowel obstruction. Sigmoid diverticulosis. Colonic wall thickening of the descending and proximal sigmoid colon consistent with colitis. Rectum appears spared.   PERITONEUM: No ascites.  VESSELS: Atherosclerotic changes. Celiac axis, SMA and LYLE are patent. Portal veins and SMV are patent.  RETROPERITONEUM/LYMPH NODES: No lymphadenopathy.    ABDOMINAL WALL: Within normal limits.  BONES: Left hip arthroplasty. Degenerativechanges.    IMPRESSION:     Colitis of the descending and proximal sigmoid colon..                      CARLITOS SIGALA M.D., RADIOLOGY RESIDENT  This document has been electronically signed.  SHRUTHI SANZ M.D., ATTENDING RADIOLOGIST  This document has been electronically signed. May 15 2020  9:01PM                < end of copied text >

## 2020-05-16 NOTE — PROGRESS NOTE ADULT - ASSESSMENT
Impression:    84 yo F with PMHx of ulcerative colitis, hypothyroidism, HTN, HLD who was advised to come to the ED by Dr. Varghese (GI) for abdominal pain, bloody diarrhea, intractable N/V since 5/12.    1) Bloody Diarrhea  Patient presenting with worsening abdominal pain and bloody diarrhea since 5/12 not improving with apriso or budesonide.  DDx: UC flare vs. infectious colitis    2) Nausea vomiting - Acute onset  DDx: UC flare vs. viral gastroenteritis.     Recommendations:  - monitor CBCs daily and transfuse for goal Hgb >/= 7.0  - F/U obtain C-diff PCR and GI PCR panel  - F/U ESR. CRP, fecal calprotectin  - can continue apriso for now  - once Cdiff and GI PCR panel returns, will consider starting IV steroids  - will consider colonoscopy during this admission pending clinical course  - zofran PRN for N/V  - clear liquid diet for now. If patient's N/V improves, can consider advancing to BRAT/lactose free diet as tolerated  - DVT ppx with enoxaparin  - rest of care per primary team    Please call [phone] to speak to answering service for on-call GI fellow. Can also e-mail [email or web address].

## 2020-05-16 NOTE — PROGRESS NOTE ADULT - PROBLEM SELECTOR PLAN 6
Transitions of Care Status:  1.  Name of PCP: Vignesh Quinonez  2.  PCP Contacted on Admission: [ ] Y    [ ] N    3.  PCP contacted at Discharge: [ ] Y    [ ] N    [ ] N/A  4.  Post-Discharge Appointment Date and Location:  5.  Summary of Handoff given to PCP:

## 2020-05-16 NOTE — PROGRESS NOTE ADULT - PROBLEM SELECTOR PLAN 1
UC flare vs infectious etiology  GI consulted   Await C diff and GI PCR panel  ESR and CRP both elevated   F/u fecal calprotectin  May benefit from trial of steroids if infectious w/u negative  GI considering colonoscopy during this admission  Cont clear liquid diet for now  Serial CBCs and transfuse for Hb<7

## 2020-05-17 NOTE — PROGRESS NOTE ADULT - PROBLEM SELECTOR PLAN 6
Transitions of Care Status:  1.  Name of PCP: Vignesh Quinonez  2.  PCP Contacted on Admission: [ ] Y    [ ] N    3.  PCP contacted at Discharge: [ ] Y    [ ] N    [ ] N/A  4.  Post-Discharge Appointment Date and Location:  5.  Summary of Handoff given to PCP:    Discharge pending improvement and GI follow up. If able to tolerate diet advancement possible d/c home in next 24-48 hours.

## 2020-05-17 NOTE — PROGRESS NOTE ADULT - PROBLEM SELECTOR PLAN 1
UC flare  Infectious etiology less likely given neg stool culture and formed stool  Advance diet as per GI recs  F/u fecal calprotectin  Await GI follow up - steroids +/- colonoscopy seem less likely given improvement however patient will need maintenance therapy   Serial CBCs and transfuse for Hb<7

## 2020-05-17 NOTE — PROGRESS NOTE ADULT - SUBJECTIVE AND OBJECTIVE BOX
Carlos Rebolledo MD  Pager [phone]  Spectra [phone]  Cell Phone [phone]    Patient is a 85y old  Female who presents with a chief complaint of UC flare (16 May 2020 13:53)        SUBJECTIVE / OVERNIGHT EVENTS: Patient feeling better today. Tolerating clear liquid diet had a formed stool this am. Abd pain has diminished.        MEDICATIONS  (STANDING):  atorvastatin 20 milliGRAM(s) Oral at bedtime  enoxaparin Injectable 40 milliGRAM(s) SubCutaneous daily  levothyroxine 50 MICROGram(s) Oral daily  losartan 50 milliGRAM(s) Oral daily  sotalol 40 milliGRAM(s) Oral two times a day    MEDICATIONS  (PRN):  ondansetron Injectable 4 milliGRAM(s) IV Push every 6 hours PRN Nausea and/or Vomiting      Vital Signs Last 24 Hrs  T(C): 36.6 (17 May 2020 04:52), Max: 36.7 (16 May 2020 20:35)  T(F): 97.9 (17 May 2020 04:52), Max: 98.1 (16 May 2020 20:35)  HR: 56 (17 May 2020 04:52) (56 - 64)  BP: 126/82 (17 May 2020 04:52) (107/70 - 126/82)  BP(mean): --  RR: 18 (17 May 2020 04:52) (18 - 18)  SpO2: 95% (17 May 2020 04:52) (95% - 98%)  CAPILLARY BLOOD GLUCOSE        I&O's Summary    16 May 2020 07:01  -  17 May 2020 07:00  --------------------------------------------------------  IN: 1380 mL / OUT: 2 mL / NET: 1378 mL          PHYSICAL EXAM  GENERAL: NAD, well-developed  HEAD:  Atraumatic, Normocephalic  EYES: EOMI, PERRLA, conjunctiva and sclera clear  CHEST/LUNG: Clear to auscultation bilaterally; No wheeze  HEART: Regular rate and rhythm; No murmurs, rubs, or gallops  ABDOMEN: Soft, Mild L sided tenderness to palpation, Mildly distended; Bowel sounds present  EXTREMITIES:  2+ Peripheral Pulses, No clubbing, cyanosis; trace pedal edema  PSYCH: AAOx3      LABS:                        10.2   5.64  )-----------( 241      ( 17 May 2020 06:50 )             31.7     05-    144  |  107  |  10  ----------------------------<  94  3.7   |  24  |  0.88    Ca    8.6      17 May 2020 06:50  Phos  2.9     -  Mg     2.1     -    TPro  6.0  /  Alb  3.3  /  TBili  0.6  /  DBili  x   /  AST  25  /  ALT  12  /  AlkPhos  53  05-16    PT/INR - ( 15 May 2020 16:19 )   PT: 11.0 sec;   INR: 0.97 ratio         PTT - ( 15 May 2020 16:19 )  PTT:25.9 sec      Urinalysis Basic - ( 15 May 2020 17:57 )    Color: Yellow / Appearance: Clear / S.022 / pH: x  Gluc: x / Ketone: Small  / Bili: Negative / Urobili: Negative   Blood: x / Protein: Trace / Nitrite: Negative   Leuk Esterase: Negative / RBC: 1 /hpf / WBC 2 /HPF   Sq Epi: x / Non Sq Epi: 3 / Bacteria: Negative        GI PCR Panel, Stool (collected 16 May 2020 12:15)  Source: .Stool Feces, ismael rucker  Final Report (16 May 2020 16:08):    GI PCR Results: NOT detected    *******Please Note:*******    GI panel PCR evaluates for:    Campylobacter, Plesiomonas shigelloides, Salmonella,    Vibrio, Yersinia enterocolitica, Enteroaggregative    Escherichia coli (EAEC), Enteropathogenic E.coli (EPEC),    Enterotoxigenic E. coli (ETEC) lt/st, Shiga-like    toxin-producing E. coli (STEC) stx1/stx2,    Shigella/ Enteroinvasive E. coli (EIEC), Cryptosporidium,    Cyclospora cayetanensis, Entamoeba histolytica,    Giardia lamblia, Adenovirus F 40/41, Astrovirus,    Norovirus GI/GII, Rotavirus A, Sapovirus    Culture - Urine (collected 15 May 2020 22:25)  Source: .Urine Clean Catch (Midstream)  Final Report (16 May 2020 21:59):    No growth        RADIOLOGY & ADDITIONAL TESTS:    Imaging Personally Reviewed:  Consultant(s) Notes Reviewed:    Care Discussed with Consultants/Other Providers:

## 2020-05-18 NOTE — PROGRESS NOTE ADULT - ATTENDING COMMENTS
UC exacerbation  Rec IV solumedrol 20mg Q8  PO as tolerated  Anticipate switch to po prednisone and DC home asap

## 2020-05-18 NOTE — DISCHARGE NOTE PROVIDER - NSDCCPCAREPLAN_GEN_ALL_CORE_FT
PRINCIPAL DISCHARGE DIAGNOSIS  Diagnosis: Colitis, ulcerative  Assessment and Plan of Treatment: Continue current medications, follow up with Dr. Varghese as scheduled      SECONDARY DISCHARGE DIAGNOSES  Diagnosis: Hypertension, Essential  Assessment and Plan of Treatment: Follow up with your medical doctor to establish long term blood pressure treatment goals.      Diagnosis: SVT (supraventricular tachycardia)  Assessment and Plan of Treatment: Continue current medications, follow up with cardiology PRINCIPAL DISCHARGE DIAGNOSIS  Diagnosis: Colitis, ulcerative  Assessment and Plan of Treatment: Continue current medications, follow up with Dr. Varghese as scheduled      SECONDARY DISCHARGE DIAGNOSES  Diagnosis: Hypertension, Essential  Assessment and Plan of Treatment: Follow up with your medical doctor to establish long term blood pressure treatment goals.      Diagnosis: SVT (supraventricular tachycardia)  Assessment and Plan of Treatment: Continue with Sotalol as prescribed    Diagnosis: Lactic acid acidosis  Assessment and Plan of Treatment: Improved

## 2020-05-18 NOTE — DISCHARGE NOTE PROVIDER - HOSPITAL COURSE
85F pmhx ulcerative colitis, SVT on sotolol, hypothyroid, HTN, HLD p/w colitis flare confirmed on CT - likely inflammatory etiology        Colitis, ulcerative.  likely UC flare    Infectious etiology less likely given neg stool culture and formed stool    patient is tolerating diet, no c/o n/v    prednisone 40 mg PO on discharge    Pt medically cleared for discharge to home and f/u with GI 85F pmhx ulcerative colitis, SVT on sotolol, hypothyroid, HTN, HLD p/w colitis flare confirmed on CT - likely inflammatory etiology        Colitis, ulcerative.  likely UC flare    Infectious etiology less likely given neg stool culture and formed stool    patient is tolerating diet, no c/o n/v    prednisone 40 mg PO on discharge    Pt medically cleared for discharge to home and f/u with GI                    Attending Addendum    Discharge Diagnoses    1) Colitis due to exacerbation of ulcerative colitis    2) SVT    3) HTN    4) Hypothyroid

## 2020-05-18 NOTE — DISCHARGE NOTE PROVIDER - CARE PROVIDER_API CALL
Eri Beltran  GASTROENTEROLOGY  [address] [place] [address]  Phone: [phone]  Fax: [phone]  Follow Up Time:     Xi Quinonez  INTERNAL MEDICINE  [address] [place] [address]  Phone: [phone]  Fax: [phone]  Follow Up Time:

## 2020-05-18 NOTE — DISCHARGE NOTE PROVIDER - NSDCFUSCHEDAPPT_GEN_ALL_CORE_FT
WARD DICKSON ; 05/28/2020 ; [facility] [address] WARD Mathews ; 06/03/2020 ; NPP Med Gen[facility] [address] [facility] WARD DICKSON ; 06/03/2020 ; NPP Med [facility] [address] [facility]  WARD DICKSON ; 06/23/2020 ; N[facility] Cardio [facility] [address] WARD DICKSON ; 06/03/2020 ; NPP Med GenInt [address] [facility]  WARD DICKSON ; 06/23/2020 ; NPP Cardio Electro [address]

## 2020-05-18 NOTE — DISCHARGE NOTE PROVIDER - CARE PROVIDERS DIRECT ADDRESSES
[email or web address]

## 2020-05-18 NOTE — PROGRESS NOTE ADULT - SUBJECTIVE AND OBJECTIVE BOX
Chief Complaint:  Patient is a 85y old  Female who presents with a chief complaint of UC flare (17 May 2020 11:06)      Interval Events:   Patient still pending C. diff results before starting steroids.    Allergies:  amoxicillin (Other)  Keflex (Other)  penicillin (Unknown)  sulfa drugs (Unknown)      Hospital Medications:  atorvastatin 20 milliGRAM(s) Oral at bedtime  enoxaparin Injectable 40 milliGRAM(s) SubCutaneous daily  levothyroxine 50 MICROGram(s) Oral daily  losartan 50 milliGRAM(s) Oral daily  ondansetron Injectable 4 milliGRAM(s) IV Push every 6 hours PRN  sotalol 40 milliGRAM(s) Oral two times a day      PMHX/PSHX:  Colitis  Atrial fibrillation with rapid ventricular response  Hypothyroid  SVT (supraventricular tachycardia)  Lipoma of neck  Atrial Tachycardia  Hypercholesteremia  Hypertension, Essential  Elective surgery  S/P tonsillectomy  S/P D&C (status post dilation and curettage)  H/O: Hysterectomy      Family history:  Family history of Parkinson's disease (Sibling)  Family history of CVA  Family history of renal failure      ROS:  General: no night sweats, wt loss  CV: no pain, palpitation  Resp: no cough wheezing  Muscles: no weakness or pain  Endocrine: no polyuria, polydipsia, cold/heat intolerance  Heme: No petechia, ecchymosis    PHYSICAL EXAM:   GENERAL:  NAD  HEENT:  NC/AT,  conjunctivae clear, sclera -anicteric  CHEST:  Full & symmetric excursion, no increased  HEART:  Regular rhythm  ABDOMEN:  Soft, non-tender, non-distended, normoactive bowel sounds,  no masses ,no hepato-splenomegaly,   EXTREMITIES:  no cyanosis,clubbing or edema  SKIN:  No rash/erythema/ecchymoses/petechiae/wounds/abscess/warm/dry  NEURO:  Alert, oriented    Vital Signs:  Vital Signs Last 24 Hrs  T(C): 36.7 (18 May 2020 05:03), Max: 36.7 (18 May 2020 05:03)  T(F): 98.1 (18 May 2020 05:03), Max: 98.1 (18 May 2020 05:03)  HR: 62 (18 May 2020 05:03) (60 - 68)  BP: 122/68 (18 May 2020 05:03) (122/68 - 145/71)  BP(mean): --  RR: 20 (18 May 2020 05:03) (18 - 20)  SpO2: 94% (18 May 2020 05:03) (94% - 95%)  Daily     Daily     LABS:                        10.2   5.64  )-----------( 241      ( 17 May 2020 06:50 )             31.7     05-17    144  |  107  |  10  ----------------------------<  94  3.7   |  24  |  0.88    Ca    8.6      17 May 2020 06:50  Phos  2.9     05-17  Mg     2.1     05-17                Imaging:

## 2020-05-18 NOTE — DISCHARGE NOTE PROVIDER - NSDCMRMEDTOKEN_GEN_ALL_CORE_FT
budesonide 9 mg oral tablet, extended release: 1 tab(s) orally once a day (in the morning)  Multiple Vitamins oral tablet: 1 tab(s) orally once a day  sotalol 80 mg oral tablet: 0.5 tab(s) orally 2 times a day  Synthroid 50 mcg (0.05 mg) oral tablet: 1 tab(s) orally 2 times a day atorvastatin 20 mg oral tablet: 1 tab(s) orally once a day (at bedtime)  levothyroxine 50 mcg (0.05 mg) oral tablet: 1 tab(s) orally once a day  losartan 25 mg oral tablet: 1 tab(s) orally once a day   Multiple Vitamins oral tablet: 1 tab(s) orally once a day  predniSONE 20 mg oral tablet: 2 tab(s) orally once a day   sotalol 80 mg oral tablet: 0.5 tab(s) orally 2 times a day

## 2020-05-18 NOTE — PROGRESS NOTE ADULT - ASSESSMENT
Impression:    84 yo F with PMHx of ulcerative colitis, hypothyroidism, HTN, HLD who was advised to come to the ED by Dr. Varghese (GI) for abdominal pain, bloody diarrhea, intractable N/V since 5/12.    1) Bloody Diarrhea  Patient presenting with worsening abdominal pain and bloody diarrhea since 5/12 not improving with apriso or budesonide.  DDx: UC flare vs. infectious colitis    2) Nausea vomiting - Acute onset  DDx: UC flare vs. viral gastroenteritis.     Recommendations:  - monitor CBCs daily and transfuse for goal Hgb >/= 7.0  - F/U obtain C-diff PCR   - F/U fecal calprotectin  - can continue apriso for now  - once Cdiff and GI PCR panel returns, will consider starting IV steroids  - will consider colonoscopy during this admission pending clinical course  - zofran PRN for N/V  - clear liquid diet for now. If patient's N/V improves, can consider advancing to BRAT/lactose free diet as tolerated  - DVT ppx with enoxaparin  - rest of care per primary team

## 2020-05-18 NOTE — PROGRESS NOTE ADULT - SUBJECTIVE AND OBJECTIVE BOX
[facility] Division of Hospital Medicine  Bruno Dacosta MD, ISHMAEL  Pager (KELSY-F, 8A-5P): [phone]  Other Times:  [phone]    Patient is a 85y old  Female who presents with a chief complaint of UC flare (18 May 2020 11:40)      SUBJECTIVE / OVERNIGHT EVENTS:  No events overnight. The patient states that her abdominal pain is not localized to the LLQ, 1/10 severity, which has improved since admission of 9/10 severity. Nausea has resolved, and she's tolerating a regular diet. She states that her stools are closer to formed - not diarrhea.     MEDICATIONS  (STANDING):  atorvastatin 20 milliGRAM(s) Oral at bedtime  enoxaparin Injectable 40 milliGRAM(s) SubCutaneous daily  levothyroxine 50 MICROGram(s) Oral daily  losartan 50 milliGRAM(s) Oral daily  sotalol 40 milliGRAM(s) Oral two times a day    MEDICATIONS  (PRN):  ondansetron Injectable 4 milliGRAM(s) IV Push every 6 hours PRN Nausea and/or Vomiting    I&O's Summary    17 May 2020 07:01  -  18 May 2020 07:00  --------------------------------------------------------  IN: 820 mL / OUT: 1 mL / NET: 819 mL    18 May 2020 07:01  -  18 May 2020 15:00  --------------------------------------------------------  IN: 480 mL / OUT: 1 mL / NET: 479 mL        PHYSICAL EXAM:  Vital Signs Last 24 Hrs  T(C): 37.1 (18 May 2020 10:59), Max: 37.1 (18 May 2020 10:59)  T(F): 98.7 (18 May 2020 10:59), Max: 98.7 (18 May 2020 10:59)  HR: 63 (18 May 2020 10:59) (62 - 68)  BP: 137/81 (18 May 2020 10:59) (122/68 - 138/67)  BP(mean): --  RR: 18 (18 May 2020 10:59) (18 - 20)  SpO2: 97% (18 May 2020 10:59) (94% - 97%)    GENERAL: NAD, well-developed  HEAD:  Atraumatic, Normocephalic  EYES: EOMI, conjunctiva and sclera clear  CHEST/LUNG: Clear to auscultation bilaterally; No wheeze  HEART: Regular rate and rhythm; S1, S2, No murmurs, rubs, or gallops  ABDOMEN: Soft, Mild L sided tenderness to palpation, Mildly distended; Bowel sounds present  EXTREMITIES:  No clubbing, cyanosis; no pedal edema  PSYCH: AAOx3, calm      LABS:                        10.7   6.09  )-----------( 259      ( 18 May 2020 08:52 )             32.7     05-18    145  |  108  |  16  ----------------------------<  103<H>  4.3   |  24  |  0.98    Ca    9.0      18 May 2020 08:52  Phos  3.4     05-18  Mg     2.0     05-18                GI PCR Panel, Stool (collected 16 May 2020 12:15)  Source: .Stool Feces, ismael rucker  Final Report (16 May 2020 16:08):    GI PCR Results: NOT detected    *******Please Note:*******    GI panel PCR evaluates for:    Campylobacter, Plesiomonas shigelloides, Salmonella,    Vibrio, Yersinia enterocolitica, Enteroaggregative    Escherichia coli (EAEC), Enteropathogenic E.coli (EPEC),    Enterotoxigenic E. coli (ETEC) lt/st, Shiga-like    toxin-producing E. coli (STEC) stx1/stx2,    Shigella/ Enteroinvasive E. coli (EIEC), Cryptosporidium,    Cyclospora cayetanensis, Entamoeba histolytica,    Giardia lamblia, Adenovirus F 40/41, Astrovirus,    Norovirus GI/GII, Rotavirus A, Sapovirus    Culture - Urine (collected 15 May 2020 22:25)  Source: .Urine Clean Catch (Midstream)  Final Report (16 May 2020 21:59):    No growth        RADIOLOGY & ADDITIONAL TESTS:    Lab Results Reviewed: leukocytosis resolved. mildly elevated lactate.     Imaging Personally Reviewed: CT Abd

## 2020-05-18 NOTE — PROGRESS NOTE ADULT - PROBLEM SELECTOR PLAN 1
likely UC flare  Infectious etiology less likely given neg stool culture and formed stool  Advanced diet as per GI recs, patient is tolerating it  F/u fecal calprotectin  Await GI follow up re IV steroids. Unable to send stool C.diff due to formed stool. May require an outpatient colonoscopy.  Monitor labs

## 2020-05-19 NOTE — PROGRESS NOTE ADULT - PROBLEM SELECTOR PLAN 7
Transitions of Care Status:  1.  Name of PCP: Vignesh Quinonez  2.  PCP Contacted on Admission: [ ] Y    [ ] N    3.  PCP contacted at Discharge: [ ] Y    [ ] N    [ ] N/A  4.  Post-Discharge Appointment Date and Location:  5.  Summary of Handoff given to PCP:    Discharge to home likely by tomorrow on PO prednisone if lactate improves.

## 2020-05-19 NOTE — PROGRESS NOTE ADULT - SUBJECTIVE AND OBJECTIVE BOX
Chief Complaint:  Patient is a 85y old  Female who presents with a chief complaint of UC flare (18 May 2020 14:58)      Interval Events:   Patient started on IV steroids yesterday with improvement in symptoms    Allergies:  amoxicillin (Other)  Keflex (Other)  penicillin (Unknown)  sulfa drugs (Unknown)      Hospital Medications:  atorvastatin 20 milliGRAM(s) Oral at bedtime  enoxaparin Injectable 40 milliGRAM(s) SubCutaneous daily  levothyroxine 50 MICROGram(s) Oral daily  losartan 50 milliGRAM(s) Oral daily  methylPREDNISolone sodium succinate Injectable 20 milliGRAM(s) IV Push every 8 hours  ondansetron Injectable 4 milliGRAM(s) IV Push every 6 hours PRN  sotalol 40 milliGRAM(s) Oral two times a day      PMHX/PSHX:  Colitis  Atrial fibrillation with rapid ventricular response  Hypothyroid  SVT (supraventricular tachycardia)  Lipoma of neck  Atrial Tachycardia  Hypercholesteremia  Hypertension, Essential  Elective surgery  S/P tonsillectomy  S/P D&C (status post dilation and curettage)  H/O: Hysterectomy      Family history:  Family history of Parkinson's disease (Sibling)  Family history of CVA  Family history of renal failure      ROS:  General: no night sweats, wt loss  CV: no pain, palpitation  Resp: no cough wheezing  Muscles: no weakness or pain  Endocrine: no polyuria, polydipsia, cold/heat intolerance  Heme: No petechia, ecchymosis    PHYSICAL EXAM:   GENERAL:  NAD  HEENT:  NC/AT,  conjunctivae clear, sclera -anicteric  CHEST:  Full & symmetric excursion, no increased  HEART:  Regular rhythm  ABDOMEN:  Soft, non-tender, non-distended, normoactive bowel sounds,  no masses ,no hepato-splenomegaly,   EXTREMITIES:  no cyanosis,clubbing or edema  SKIN:  No rash/erythema/ecchymoses/petechiae/wounds/abscess/warm/dry  NEURO:  Alert, oriented    Vital Signs:  Vital Signs Last 24 Hrs  T(C): 36.6 (19 May 2020 05:32), Max: 37.1 (18 May 2020 10:59)  T(F): 97.8 (19 May 2020 05:32), Max: 98.7 (18 May 2020 10:59)  HR: 66 (19 May 2020 05:32) (63 - 66)  BP: 116/72 (19 May 2020 05:32) (113/72 - 137/81)  BP(mean): --  RR: 18 (19 May 2020 05:32) (18 - 18)  SpO2: 98% (19 May 2020 05:32) (97% - 98%)  Daily     Daily     LABS:                        10.2   4.86  )-----------( 287      ( 19 May 2020 07:17 )             33.2     05-19    142  |  105  |  21  ----------------------------<  147<H>  3.7   |  22  |  0.88    Ca    9.0      19 May 2020 07:13  Phos  3.4     05-18  Mg     2.0     05-18                Imaging:

## 2020-05-19 NOTE — PROGRESS NOTE ADULT - ASSESSMENT
85F pmhx ulcerative colitis, SVT on sotolol, hypothyroid, HTN, HLD p/w colitis flare confirmed on CT - likely inflammatory etiology

## 2020-05-19 NOTE — PROGRESS NOTE ADULT - ATTENDING COMMENTS
Improving UC complaints with IV solumedrol  Continue PO as tolerated  Anticipate DC tomorrow on PO prednisone, with outpatient follow up with Dr Varghese

## 2020-05-19 NOTE — PROGRESS NOTE ADULT - PROBLEM SELECTOR PLAN 1
likely UC flare  Infectious etiology less likely given neg stool culture and formed stool  Advanced diet as per GI recs, patient is tolerating it  F/u fecal calprotectin  Started Solu-Medrol 20mg IV q8h yesterday afternoon. Plan to transition to long-term PO prednisone 40mg daily until GI follow up and outpatient colonoscopy.  Likely d/c to home tomorrow if lactate improves on IVF.   Monitor labs

## 2020-05-19 NOTE — PROGRESS NOTE ADULT - SUBJECTIVE AND OBJECTIVE BOX
[facility] Division of Hospital Medicine  Bruno Dacosta MD, ISHMAEL  Pager (M-F, 8A-5P): [phone]  Other Times:  [phone]    Patient is a 85y old  Female who presents with a chief complaint of UC flare (19 May 2020 08:03)      SUBJECTIVE / OVERNIGHT EVENTS:  No events overnight. Denies diarrhea and abdominal pain.     MEDICATIONS  (STANDING):  atorvastatin 20 milliGRAM(s) Oral at bedtime  enoxaparin Injectable 40 milliGRAM(s) SubCutaneous daily  lactated ringers. 1000 milliLiter(s) (75 mL/Hr) IV Continuous <Continuous>  levothyroxine 50 MICROGram(s) Oral daily  losartan 50 milliGRAM(s) Oral daily  methylPREDNISolone sodium succinate Injectable 20 milliGRAM(s) IV Push every 8 hours  sotalol 40 milliGRAM(s) Oral two times a day    MEDICATIONS  (PRN):  ondansetron Injectable 4 milliGRAM(s) IV Push every 6 hours PRN Nausea and/or Vomiting    CAPILLARY BLOOD GLUCOSE        I&O's Summary    18 May 2020 07:01  -  19 May 2020 07:00  --------------------------------------------------------  IN: 730 mL / OUT: 3 mL / NET: 727 mL        PHYSICAL EXAM:  Vital Signs Last 24 Hrs  T(C): 36.6 (19 May 2020 05:32), Max: 36.7 (18 May 2020 19:23)  T(F): 97.8 (19 May 2020 05:32), Max: 98.1 (18 May 2020 19:23)  HR: 66 (19 May 2020 05:32) (65 - 66)  BP: 116/72 (19 May 2020 05:32) (113/72 - 116/72)  BP(mean): --  RR: 18 (19 May 2020 05:32) (18 - 18)  SpO2: 98% (19 May 2020 05:32) (98% - 98%)    CONSTITUTIONAL: NAD, well-developed, well-groomed  EYES: PERRLA; EOMI, conjunctiva and sclera clear  ENMT: Moist oral mucosa, no pharyngeal injection or exudates; normal dentition  NECK: Supple, no palpable masses; no thyromegaly  RESPIRATORY: Normal respiratory effort; lungs are clear to auscultation bilaterally  CARDIOVASCULAR: Regular rate and rhythm, normal S1 and S2, no murmur/rub/gallop; No lower extremity edema; Peripheral pulses are 2+ bilaterally  ABDOMEN: normoactive bowel sounds, soft, nontender to palpation, no rebound/guarding; no distension   MUSCULOSKELETAL:  Normal gait; no clubbing or cyanosis of digits; no joint swelling or tenderness to palpation  PSYCH: A+O to person, place, and time; affect appropriate  NEUROLOGY: CN 2-12 are intact and symmetric; no gross sensory deficits   SKIN: No rashes; no palpable lesions    LABS:                        10.2   4.86  )-----------( 287      ( 19 May 2020 07:17 )             33.2     05-19    142  |  105  |  21  ----------------------------<  147<H>  3.7   |  22  |  0.88    Ca    9.0      19 May 2020 07:13  Phos  3.4     05-18  Mg     2.0     05-18                GI PCR Panel, Stool (collected 16 May 2020 12:15)  Source: .Stool Feces, ismael alka  Final Report (16 May 2020 16:08):    GI PCR Results: NOT detected    *******Please Note:*******    GI panel PCR evaluates for:    Campylobacter, Plesiomonas shigelloides, Salmonella,    Vibrio, Yersinia enterocolitica, Enteroaggregative    Escherichia coli (EAEC), Enteropathogenic E.coli (EPEC),    Enterotoxigenic E. coli (ETEC) lt/st, Shiga-like    toxin-producing E. coli (STEC) stx1/stx2,    Shigella/ Enteroinvasive E. coli (EIEC), Cryptosporidium,    Cyclospora cayetanensis, Entamoeba histolytica,    Giardia lamblia, Adenovirus F 40/41, Astrovirus,    Norovirus GI/GII, Rotavirus A, Sapovirus        RADIOLOGY & ADDITIONAL TESTS:    Lab Results Reviewed:     Imaging Personally Reviewed:     Telemetry reviewed:     ECG Personally Reviewed:       COORDINATION OF CARE:  Care Discussed with Consultants/Other Providers:    Prior or Outpatient Records Reviewed: [facility] Division of Hospital Medicine  Bruno Dacosta MD, ISHMAEL  Pager (M-F, 8A-5P): [phone]  Other Times:  [phone]    Patient is a 85y old  Female who presents with a chief complaint of UC flare (19 May 2020 08:03)      SUBJECTIVE / OVERNIGHT EVENTS:  No events overnight. Denies diarrhea and abdominal pain.     MEDICATIONS  (STANDING):  atorvastatin 20 milliGRAM(s) Oral at bedtime  enoxaparin Injectable 40 milliGRAM(s) SubCutaneous daily  lactated ringers. 1000 milliLiter(s) (75 mL/Hr) IV Continuous <Continuous>  levothyroxine 50 MICROGram(s) Oral daily  losartan 50 milliGRAM(s) Oral daily  methylPREDNISolone sodium succinate Injectable 20 milliGRAM(s) IV Push every 8 hours  sotalol 40 milliGRAM(s) Oral two times a day    MEDICATIONS  (PRN):  ondansetron Injectable 4 milliGRAM(s) IV Push every 6 hours PRN Nausea and/or Vomiting      I&O's Summary    18 May 2020 07:01  -  19 May 2020 07:00  --------------------------------------------------------  IN: 730 mL / OUT: 3 mL / NET: 727 mL        PHYSICAL EXAM:  Vital Signs Last 24 Hrs  T(C): 36.6 (19 May 2020 05:32), Max: 36.7 (18 May 2020 19:23)  T(F): 97.8 (19 May 2020 05:32), Max: 98.1 (18 May 2020 19:23)  HR: 66 (19 May 2020 05:32) (65 - 66)  BP: 116/72 (19 May 2020 05:32) (113/72 - 116/72)  BP(mean): --  RR: 18 (19 May 2020 05:32) (18 - 18)  SpO2: 98% (19 May 2020 05:32) (98% - 98%)    GENERAL: NAD, well-developed  HEAD:  Atraumatic, Normocephalic  EYES: EOMI, conjunctiva and sclera clear  CHEST/LUNG: Clear to auscultation bilaterally; No wheeze  HEART: Regular rate and rhythm; S1, S2, No murmurs, rubs, or gallops  ABDOMEN: Soft, nontender, mildly distended; Bowel sounds present  EXTREMITIES:  No clubbing, cyanosis; no pedal edema  PSYCH: AAOx3, calm    LABS:                        10.2   4.86  )-----------( 287      ( 19 May 2020 07:17 )             33.2     05-19    142  |  105  |  21  ----------------------------<  147<H>  3.7   |  22  |  0.88    Ca    9.0      19 May 2020 07:13  Phos  3.4     05-18  Mg     2.0     05-18      GI PCR Panel, Stool (collected 16 May 2020 12:15)  Source: .Stool Feces, ismael alka  Final Report (16 May 2020 16:08):    GI PCR Results: NOT detected    *******Please Note:*******    GI panel PCR evaluates for:    Campylobacter, Plesiomonas shigelloides, Salmonella,    Vibrio, Yersinia enterocolitica, Enteroaggregative    Escherichia coli (EAEC), Enteropathogenic E.coli (EPEC),    Enterotoxigenic E. coli (ETEC) lt/st, Shiga-like    toxin-producing E. coli (STEC) stx1/stx2,    Shigella/ Enteroinvasive E. coli (EIEC), Cryptosporidium,    Cyclospora cayetanensis, Entamoeba histolytica,    Giardia lamblia, Adenovirus F 40/41, Astrovirus,    Norovirus GI/GII, Rotavirus A, Sapovirus        RADIOLOGY & ADDITIONAL TESTS:    Lab Results Reviewed: elevated lactate

## 2020-05-19 NOTE — PROGRESS NOTE ADULT - PROBLEM SELECTOR PLAN 2
Lactate 2.2 yesterday, to 2.7. Unclear etiology- likely 2/2 mild dehydration.   CT Abd with patent vessels- ischemic colitis unlikely.   Start IVF LR at 75 cc/hr  Recheck lactate in AM.

## 2020-05-19 NOTE — PROGRESS NOTE ADULT - ASSESSMENT
Impression:    84 yo F with PMHx of ulcerative colitis, hypothyroidism, HTN, HLD who was advised to come to the ED by Dr. Varghese (GI) for abdominal pain, bloody diarrhea, intractable N/V since 5/12.    1) Bloody Diarrhea  Patient presenting with worsening abdominal pain and bloody diarrhea since 5/12 not improving with apriso or budesonide.  DDx: UC flare vs. infectious colitis    2) Nausea vomiting - Acute onset  DDx: UC flare vs. viral gastroenteritis.     Recommendations:  - monitor CBCs daily and transfuse for goal Hgb >/= 7.0  - c/w IV steroids now and switch to 40 mg PO on discharge  - zofran PRN for N/V  - clear liquid diet for now. If patient's N/V improves, can consider advancing to BRAT/lactose free diet as tolerated  - DVT ppx with enoxaparin  - rest of care per primary team

## 2020-05-20 NOTE — DISCHARGE NOTE NURSING/CASE MANAGEMENT/SOCIAL WORK - PATIENT PORTAL LINK FT
You can access the FollowMyHealth Patient Portal offered by [facility] by registering at the following website: [email or web address]. By joining [facility]’s FollowMyHealth portal, you will also be able to view your health information using other applications (apps) compatible with our system.

## 2020-05-20 NOTE — PROGRESS NOTE ADULT - ASSESSMENT
Impression:  84 yo F with PMHx of ulcerative colitis, hypothyroidism, HTN, HLD who was advised to come to the ED by Dr. Varghese (GI) for abdominal pain, bloody diarrhea, intractable N/V since 5/12.    1) Bloody Diarrhea  Patient presenting with worsening abdominal pain and bloody diarrhea since 5/12 not improving with apriso or budesonide.  DDx: UC flare vs. infectious colitis    2) Nausea vomiting - Acute onset  DDx: UC flare vs. viral gastroenteritis.     Recommendations:  - monitor CBCs daily and transfuse for goal Hgb >/= 7.0  - prednisone 40 mg PO on discharge  - zofran PRN for N/V  - clear liquid diet for now. If patient's N/V improves, can consider advancing to BRAT/lactose free diet as tolerated  - DVT ppx with enoxaparin  - rest of care per primary team

## 2020-05-20 NOTE — CHART NOTE - NSCHARTNOTEFT_GEN_A_CORE
Patient seen and examined at beside  Abdominal pain is improved, no diarrhea, no GIB  Lactate improving if not fully resolved - but asymptomatic  Tolerating diet  stable for discharge today on PO steroids with outpatient GI, PCP f/u  discussed w GI Dr Chery    Transitions of Care Status:  1.  Name of PCP: Dr Quinonez  2.  PCP Contacted on Admission: [ ] Y    [ ] N    3.  PCP contacted at Discharge: [x ] Y    [ ] N    [ ] N/A  4.  Post-Discharge Appointment Date and Location:  5.  Summary of Handoff given to PCP: emailed hospital course    35 minutes spent orchestrating discharge Patient seen and examined at beside  Abdominal pain is improved, no diarrhea, no GIB  Lactate improving if not fully resolved - but asymptomatic  Tolerating diet  stable for discharge today on PO steroids with outpatient GI, PCP f/u  discussed w GI Dr Chery   called and updated, questions answered    Transitions of Care Status:  1.  Name of PCP: Dr Quinonez  2.  PCP Contacted on Admission: [ ] Y    [ ] N    3.  PCP contacted at Discharge: [x ] Y    [ ] N    [ ] N/A  4.  Post-Discharge Appointment Date and Location:  5.  Summary of Handoff given to PCP: emailed hospital course    35 minutes spent orchestrating discharge

## 2020-05-20 NOTE — PROGRESS NOTE ADULT - SUBJECTIVE AND OBJECTIVE BOX
Chief Complaint:  Patient is a 85y old  Female who presents with a chief complaint of UC flare (19 May 2020 11:45)      Interval Events:   no acute events    Allergies:  amoxicillin (Other)  Keflex (Other)  penicillin (Unknown)  sulfa drugs (Unknown)      Hospital Medications:  atorvastatin 20 milliGRAM(s) Oral at bedtime  enoxaparin Injectable 40 milliGRAM(s) SubCutaneous daily  lactated ringers. 1000 milliLiter(s) IV Continuous <Continuous>  levothyroxine 50 MICROGram(s) Oral daily  losartan 50 milliGRAM(s) Oral daily  methylPREDNISolone sodium succinate Injectable 20 milliGRAM(s) IV Push every 8 hours  ondansetron Injectable 4 milliGRAM(s) IV Push every 6 hours PRN  pantoprazole    Tablet 40 milliGRAM(s) Oral before breakfast  sotalol 40 milliGRAM(s) Oral two times a day      PMHX/PSHX:  Colitis  Atrial fibrillation with rapid ventricular response  Hypothyroid  SVT (supraventricular tachycardia)  Lipoma of neck  Atrial Tachycardia  Hypercholesteremia  Hypertension, Essential  Elective surgery  S/P tonsillectomy  S/P D&C (status post dilation and curettage)  H/O: Hysterectomy      Family history:  Family history of Parkinson's disease (Sibling)  Family history of CVA  Family history of renal failure      ROS:  General: no night sweats, wt loss  CV: no pain, palpitation  Resp: no cough wheezing  Muscles: no weakness or pain  Endocrine: no polyuria, polydipsia, cold/heat intolerance  Heme: No petechia, ecchymosis    PHYSICAL EXAM:   GENERAL:  NAD  HEENT:  NC/AT,  conjunctivae clear, sclera -anicteric  CHEST:  Full & symmetric excursion, no increased  HEART:  Regular rhythm  ABDOMEN:  Soft, non-tender, non-distended, normoactive bowel sounds,  no masses ,no hepato-splenomegaly,   EXTREMITIES:  no cyanosis,clubbing or edema  SKIN:  No rash/erythema/ecchymoses/petechiae/wounds/abscess/warm/dry  NEURO:  Alert, oriented    Vital Signs:  Vital Signs Last 24 Hrs  T(C): 36.9 (19 May 2020 19:42), Max: 37 (19 May 2020 13:53)  T(F): 98.4 (19 May 2020 19:42), Max: 98.6 (19 May 2020 13:53)  HR: 64 (19 May 2020 19:42) (64 - 75)  BP: 118/74 (19 May 2020 19:42) (118/74 - 139/68)  BP(mean): --  RR: 18 (19 May 2020 19:42) (18 - 18)  SpO2: 95% (19 May 2020 19:42) (95% - 96%)  Daily     Daily     LABS:                        10.2   4.86  )-----------( 287      ( 19 May 2020 07:17 )             33.2     05-19    142  |  105  |  21  ----------------------------<  147<H>  3.7   |  22  |  0.88    Ca    9.0      19 May 2020 07:13                Imaging:

## 2020-05-27 NOTE — ASSESSMENT
[FreeTextEntry1] : This is an 85-year-old female with a flare of left-sided colitis now with symptom improvement on prednisone. I explained to her that prednisone has to be tapered slowly. I recommend tapering her prednisone by 5 mg a week. A prescription for prednisone 5 mg tablets was sent to her pharmacy. I gave her instructions to take the prednisone starting tomorrow at 7 tablets a day Which equals 35 mg. She will taper the prednisone by 5 mg a week. SHe will start with 7 tablets of the 5 mg prednisone tomorrow for one week, 6 tablets for another week, 5 tablets for another week, 4 tablets for another week, 3 tablets for one week, 2 tablets for one week, one tablet for one week and then stop. While she is tapering the prednisone, she will start mesalamine enemas nightly. I explained to her possible side effect of prednisone including but not limited to bone loss and diabetes. I'm hoping that she will not get a flare of her UC with nightly enemas. She is reluctant to go back to [facility] because of fear of possible side effect of diarrhea. I will see her for a follow up visit in 2 weeks but she is instructed to call me she has questions or concerns.\par \par \par I spent 25 minutes on the telephonic visit with >50% of time counseling and coordinating care for UC.

## 2020-05-27 NOTE — HISTORY OF PRESENT ILLNESS
[Other Location: e.g. Home (Enter Location, City,State)___] : at [unfilled] [Home] : at home, [unfilled] , at the time of the visit. [FreeTextEntry1] : Yazmin presents for a visit. The visit was scheduled for telehealth but the patient could not work her ipad. Visit switched to telephonic. She states to feel well. She denies abdominal pain, nausea or vomiting. She was discharged approximately a week ago from [facility] where she was admitted for ulcerative colitis flare and placed on intravenous steroid. She was switched over to prednisone starting at 40 mg daily. She completed 40 mg of prednisone as of today. Over the weekend she noticed bright red blood per rectum. However she had 2 bowel movements daily with the first BM in the morning as one diarrheal stool and the one at night is formed. The stool has been brown. The blood over the weekend was bright red blood seen on the toilet paper and in the toilet bowl. She has a history of hemorrhoidal bleeding in the past. Today she only noticed small tinge of  bright red blood. Her stool has been brown. No abdominal pain. No nausea or vomiting. No fevers or chills. She has a good appetite. Overall she states to feel much better. Review of hospital records reveal elevated C-reactive protein, stool calprotectin and lactoferrin. STool GI PCR negative. CT of the abdomen and pelvis with left-sided colitis. [Verbal consent obtained from patient] : the patient, [unfilled]

## 2020-06-08 NOTE — HISTORY OF PRESENT ILLNESS
[Home] : at home, [unfilled] , at the time of the visit. [Medical Office: ([facility])___] : at the medical office located in  [Verbal consent obtained from patient] : the patient, [unfilled] [FreeTextEntry1] : Yazmin Presents for a telephonic visit. She states that she started to mesalamine enemas and developed bloody bowel movements. She stopped the enemas and the bloody bowel movements improved. Today is the first day that she has formed bowel movements without blood. She only has diarrhea in the morning before 11 AM in the rest of the day she is perfectly fine us if there's nothing wrong with her. She is currently on prednisone 30 mg daily and tapering by 5 mg a week. She is wondering if she is having side effects from mesalamines pills or enemas. I explained to her that prednisone or any steroid is only temporarily and she will need to be on maintenance medication to keep her colitis under control. If she cannot tolerate any of the mesalamine, then we'll have to consider a biologic. She is reluctant at this point to start anti-TNF due to possible complications and the fact that she doesn't have medication insurance coverage. The sulfa allergy is questionable. However because of his questionable sulfa allergy, I do not want to give her a trial of sulfasalazine. I will give her a trial of Asacol  mg for maintenance starting at one tablet 3 times a day. In the meantime she will continue to taper the prednisone. She is to call me if she has questions or concerns otherwise I will see her for another telephonic office visit in 2 weeks.

## 2020-06-08 NOTE — ASSESSMENT
[FreeTextEntry1] : This is an 85-year-old female with ulcerative colitis with symptom improvement on tapering doses of prednisone. She is afraid of going back to mesalamine as she developed bloody diarrhea with a pill form and the enema form. She is reluctant to escalate therapy to anti-TNF at this point. I will give her a trial of old or mesalamine such as Asacol. She will continue to taper the prednisone by 5 mg weekly. She is to call me if she has questions or concerns otherwise I will see her for follow up visit in 2 weeks.

## 2020-06-22 NOTE — HISTORY OF PRESENT ILLNESS
[Home] : at home, [unfilled] , at the time of the visit. [Other Location: e.g. Home (Enter Location, City,State)___] : at [unfilled] [Verbal consent obtained from patient] : the patient, [unfilled] [FreeTextEntry1] : Yazmin Presents for a telephonic visit. She states to be feeling better. On average she has 3 formed to loose bowel movements with rare rectal bleeding. No abdominal pain, nausea or vomiting. She is currently on Asacol  mg one tab tid and prednisone taper, currently at 20 mg, and tapering by 5 mg per week.

## 2020-06-22 NOTE — ASSESSMENT
[FreeTextEntry1] : This is an 85-year-old female with ulcerative colitis currently with response to Asacol HD 3 tabs daily and tapering dosing of prednisone. She is reluctant to increase the dose of Asacol to 6 tablets daily. She is to call me if she has questions or concerns otherwise she will continue to taper the prednisone by 5 mg a week. I will see her in 3 weeks time for a followup visit at which time she should be off or just about to come off prednisone.

## 2020-07-13 NOTE — HISTORY OF PRESENT ILLNESS
[Home] : at home, [unfilled] , at the time of the visit. [Verbal consent obtained from patient] : the patient, [unfilled] [Other Location: e.g. Home (Enter Location, City,State)___] : at [unfilled] [FreeTextEntry1] : Yazmin presents for a telephonic office visit. She states that she is not feeling better in that she's having 5 bowel movements daily with minimal bleeding. Minimal abdominal pain. She is currently on prednisone 5 mg daily and has 3 more days on prednisone. She is on Asacol  mg 1 tab tid. She does not feel that the mesalamine to work for her. However she does not have prescription medication insurance coverage. We have discussed biologics for treatment of ulcerative colitis on previous office visits but this will be to possibly for her. I also discussed with her immunosuppressive medications including 6 mercaptopurine or azathioprine which generally takes approximately 3 months to be effective. With these medicines she will need close blood work followup every 2 weeks due to possible complications of neutropenia, pancreatitis and hepatitis. She is also reluctant to be on these medications. She inquired about Azulfidine or sulfasalazine but She has a possible sulfa allergy.

## 2020-07-13 NOTE — ASSESSMENT
[FreeTextEntry1] : This is an 86-year-old female with left-sided colitis was doing well on higher dosing of prednisone. She tapered down the prednisone and is currently at 5 mg daily but she feels that she is now having a flare of her colitis. I recommend stool for C. difficile PCR to rule out C. difficile colitis. I recommend maximizing Asacol  mg to 2 tabs tid and using mesalamine enemas every night. If the stool for C. difficile is negative and she has no response on increasing the asacol and using mesalamine enemas nightly by next week, she is to give me a call at which time I will schedule her for flex sigmoidoscopy to evaluate severity of luminal disease. if she has active colitis, then will again have to discuss biologics/anti-TNF tx of UC given no response to mesalamines. Questions were answered. She stated understanding.

## 2020-08-02 NOTE — ED PROVIDER NOTE - OBJECTIVE STATEMENT
Patient is a 86F with PMH of SVT on sotalol Patient is a 86F with PMH of SVT on sotalol, hypothyroidism, HTN, HLD, ulcerative colitis, recent C Diff, presenting to the ED with fever and diarrhea. Patient was recently admitted in 5/2020 for UC flare, treated with IV steroids and discharged on prednisone. Patient continued to have diarrhea after discharge, tested positive for CDiff in mid-July, completed a 10-day course of Metronidazole on 7/26. Patient continues to have persistent watery diarrhea, numerous episodes per day, was told to come to the ED by her Gastroenterologist. Patient has had a fever since yesterday, Tmax 102 at home. She also complains of increased lower extremity edema since receiving prednisone. She denies any blood in her stool, CP, SOB, abd pain, nausea, vomiting, or urinary symptoms.

## 2020-08-02 NOTE — ED PROCEDURE NOTE - PROCEDURE ADDITIONAL DETAILS
POCUS: Emergency Department Focused Ultrasound performed at patient's bedside.  The complete report may be available in PACS, see below for findings.    18 gauge right brachial vein
POCUS: Emergency Department Focused Ultrasound performed at patient's bedside.  The complete report may be available in PACS, see below for findings.

## 2020-08-02 NOTE — ED PROVIDER NOTE - PHYSICAL EXAMINATION
Gen: no acute distress  HEENT: atraumatic, normocephalic, pupils equally round and reactive to light, extraocular muscles intact, no conjunctival injection  CV: regular rate and rhythym, normal S1/S2, no murmurs, rubs, or gallops  Resp: lungs clear to auscultation bilaterally, no rales, rhonchi, or wheezes  GI: mild TTP in suprapubic region/LLQ, soft, nondistended, BSx4  MSK: 2+ pitting edema in ankles b/l, extremities atraumatic, no cyanosis or clubbing  Skin: warm, dry, no rashes or lesions  Neuro: no focal deficits, sensation grossly intact  Psych: alert and oriented x3, appropriate mood and affect

## 2020-08-02 NOTE — ED PROVIDER NOTE - CARE PLAN
Principal Discharge DX:	Diarrhea, unspecified type Principal Discharge DX:	Clostridium difficile colitis Principal Discharge DX:	Clostridium difficile colitis  Secondary Diagnosis:	Pancolitis  Secondary Diagnosis:	Leukocytosis  Secondary Diagnosis:	Diarrhea

## 2020-08-02 NOTE — ED PROVIDER NOTE - CLINICAL SUMMARY MEDICAL DECISION MAKING FREE TEXT BOX
Patient is a 86F with PMH of SVT on sotalol, hypothyroidism, HTN, HLD, ulcerative colitis, recent C Diff, presenting to the ED with diarrhea for multiple months and fever x2 days. Febrile, Tmax 101.8 in the ED, with mild tenderness to palpation in the suprapubic region/LLQ on physical exam. Likely CDiff recurrence, differential also includes UC flare, infectious colitis, abscess.    -EKG  -CBC/CMP/VBG  -UA  -Panculture  -CT abdomen/pelvis with IV contrast  -Reassess after labs and imaging Patient is a 86F with PMH of SVT on sotalol, hypothyroidism, HTN, HLD, ulcerative colitis, recent C Diff, presenting to the ED with diarrhea for multiple months and fever x2 days. Febrile, Tmax 101.8 in the ED, with mild tenderness to palpation in the suprapubic region/LLQ on physical exam. Likely CDiff recurrence, differential also includes UC flare, infectious colitis, abscess.  -IV fluids  -EKG  -CBC/CMP/VBG  -UA  -Panculture  -CT abdomen/pelvis with IV contrast  -Reassess after labs and imaging

## 2020-08-02 NOTE — ED ADULT NURSE NOTE - CHPI ED NUR SYMPTOMS NEG
no dysuria/no vomiting/no blood in stool/no burning urination/no chills/no fever/no hematuria/no abdominal distension/no nausea

## 2020-08-02 NOTE — ED PROCEDURE NOTE - ATTENDING CONTRIBUTION TO CARE
***Vladimir Storey MD FACEP*** Attending physician was available for the key components of the procedure, the patient tolerated well. There were no complications with the procedure.
***Vladimir Storey MD FACEP*** Attending physician was available for the key components of the procedure, the patient tolerated well. There were no complications with the procedure.

## 2020-08-02 NOTE — ED ADULT NURSE NOTE - OBJECTIVE STATEMENT
85 y/o female hx colitis, hypothyroid, HTN, recent dx of cdiff, UC presents to the ED from home c/o fever, diarrhea. Pt states recently admitted for cdiff, 5/2020, was given outpatient antibiotics in July for diarrhea, symptoms persisted with multiple episodes of watery diarrhea- advised to come to ED by GI MD. Pt states tmax 102F. Denies chills, n/v, weakness, abd pain, diarrhea, numbness/tingling, urinary s/s. Pt A&Ox3, in no respiratory distress, no CP, abd soft, tender to palpitation in generalized region, nondistended. PT safety maintained, call bell within reach and bed in the lowest position.

## 2020-08-02 NOTE — ED PROVIDER NOTE - ATTENDING CONTRIBUTION TO CARE
Vladimir Storey MD, FACEP: patient with chief complaint of diarrhea and weakness with history of UC and + Cdiff July 16. Patient completed a course of flagyl without relief of symptoms and patient still remains symptomatic. + fever, + weakness.    mild suprapubic tenderness to palpation  non-tachycardic, non-tachypneic   trachea midline  no rash/vesicles/petechiae   cooperative, with capacity and insight   appropriate     Vladimir Storey MD, FACEP: In this physician's medical judgement based on clinical history and physical exam, concern for Cdiff vs abdominal abscess/UC flair.   will get iv, cbc, cmp, lactate and po vanco  Will follow up on labs, analgesia, imaging, reassess and disposition to the inpatient team as clinically indicated.

## 2020-08-02 NOTE — ED ADULT NURSE NOTE - NSIMPLEMENTINTERV_GEN_ALL_ED
Implemented All Universal Safety Interventions:  [place] to call system. Call bell, personal items and telephone within reach. Instruct patient to call for assistance. Room bathroom lighting operational. Non-slip footwear when patient is off stretcher. Physically safe environment: no spills, clutter or unnecessary equipment. Stretcher in lowest position, wheels locked, appropriate side rails in place.

## 2020-08-03 NOTE — H&P ADULT - HISTORY OF PRESENT ILLNESS
85F PMH ulcerative colitis, external hemorrhoids, SVT on sotolol, hypothyroid, HTN, HLD, admitted 5/15 - 5/20/20 for UC flare, now presenting with _____.     During her last admission for UC flare pt was treated with IV steroid then started on prednisone 40mg with slow weekly taper. She was started on mesalamine enemas but did not tolerate, stating he had increased bloody BM. Although the plan was to start maintenance agent for UC, such as anti-TNF, pt was hesistant given her lack of insurance, need for follow-up, and possible sulfa allergy    In Mid July, pt had visit with GI for increased BMs with minimal bleeding and minimal abdominal pain. She felt she was having a UC flare but D. diff was sent and was positive on July 13 2020. Pt was given flagyl 250mg TID x 10d, which she ____.     In ED, VS: Tmax 101.8, 92, 130/69, 22, 96% RA. Stool positive for Cdiff. Given 1.5L IVF, oral vanco, IV flagyl x1. 85F PMH ulcerative colitis, external hemorrhoids, SVT on sotolol, hypothyroid, HTN, HLD, admitted 5/15 - 5/20/20 for UC flare, now presenting with diarrhea x 2.5 months. Patient was admitted to [facility] in May 2020. She presented with 9/10 abdominal pain, n/v, and bloody diarrhea at that time. She was treated with IV steroid (no abx noted in chart) then started on prednisone 40mg with slow weekly taper. Over the next 2 months, she had 3-5 loose, brown, sometimes bloody, stools per day, increasing in frequency over time. Her GI doctor started her on mesalamine oral/enema but but patient experienced increased frequency of bloody BM and did not continue to use it. During this time, she had no fever, abdominal pain, n/v, or bloating, but she did become increasingly fatigued and her exercise capacity was limited due to fatigue. In Mid July, pt had telehealth visit with her GI doctor and described the above symptoms. A test for C. diff was sent and was positive on July 13 2020. Pt was given flagyl 250mg TID x 10d, which she completed, stating that he had no further bloody BM afterwards but continued to have the same consistence of BM with a frequency that had not decreased due to the antibiotic. 2d prior to admission, pt experienced further progression of her symptoms with a fever to 101. She called her GI office at that time and was advised to present to the ED.     In ED, VS: Tmax 101.8, 92, 130/69, 22, 96% RA. Stool positive for Cdiff. Given 1.5L IVF, oral vanco, IV flagyl x1. 86F PMH ulcerative colitis, external hemorrhoids, SVT on sotolol, hypothyroid, HTN, HLD, admitted 5/15 - 5/20/20 for UC flare, now presenting with diarrhea x 2.5 months. Patient was admitted to [facility] in May 2020. She presented with 9/10 abdominal pain, n/v, and bloody diarrhea at that time. She was treated with IV steroid (no abx noted in chart) then started on prednisone 40mg with slow weekly taper. Over the next 2 months, she had 3-5 loose, brown, sometimes bloody, stools per day, increasing in frequency over time. Her GI doctor started her on mesalamine oral/enema but but patient experienced increased frequency of bloody BM and did not continue to use it. During this time, she had no fever, abdominal pain, n/v, or bloating, but she did become increasingly fatigued and her exercise capacity was limited due to fatigue. In Mid July, pt had telehealth visit with her GI doctor and described the above symptoms. A test for C. diff was sent and was positive on July 13 2020. Pt was given flagyl 250mg TID x 10d, which she completed, stating that he had no further bloody BM afterwards but continued to have the same consistence of BM with a frequency that had not decreased due to the antibiotic. 2d prior to admission, pt experienced further progression of her symptoms with a fever to 101. She called her GI office at that time and was advised to present to the ED.     In ED, VS: Tmax 101.8, 92, 130/69, 22, 96% RA. Stool positive for Cdiff. Given 1.5L IVF, oral vanco, IV flagyl x1.

## 2020-08-03 NOTE — H&P ADULT - PROBLEM SELECTOR PLAN 3
Unlikely that this presentation represents UC flare as it is quite different from patient's previous flare - no abdominal pain, minimal bloody BM  -Outpatient follow-up

## 2020-08-03 NOTE — PROGRESS NOTE ADULT - ASSESSMENT
86F PMH ulcerative colitis, external hemorrhoids, SVT on sotolol, hypothyroid, HTN, HLD, admitted 5/15 - 5/20/20 for UC flare, now presenting with sepsis 2/2 C. diff colitis after failure of outpatient antibiotics.

## 2020-08-03 NOTE — DISCHARGE NOTE PROVIDER - NSDCCPTREATMENT_GEN_ALL_CORE_FT
PRINCIPAL PROCEDURE  Procedure: Flexible sigmoidoscopy  Findings and Treatment: Impression:          - Non-thrombosed external hemorrhoids found on perianal exam.                       - Pseudomembranous enterocolitis, consistent with diagnosed c diff.                       - Inflammation was found in the sigmoid colon secondary to ulcerative                        colitis. Biopsied.

## 2020-08-03 NOTE — H&P ADULT - NSHPSOCIALHISTORY_GEN_ALL_CORE
Patient denies smoking , alcohol and illicit drugs. Previously employed in insurance company with  retired currently Patient denies smoking , alcohol and illicit drugs. Previously employed in insurance company with  retired currently. Has multiple children who are physicians.

## 2020-08-03 NOTE — H&P ADULT - ASSESSMENT
85F PMH ulcerative colitis, external hemorrhoids, SVT on sotolol, hypothyroid, HTN, HLD, admitted 5/15 - 5/20/20 for UC flare, now presenting with sepsis 2/2 C. diff ?recurrence. 85F PMH ulcerative colitis, external hemorrhoids, SVT on sotolol, hypothyroid, HTN, HLD, admitted 5/15 - 5/20/20 for UC flare, now presenting with sepsis 2/2 C. diff colitis after failure of outpatient antibiotics. 86F PMH ulcerative colitis, external hemorrhoids, SVT on sotolol, hypothyroid, HTN, HLD, admitted 5/15 - 5/20/20 for UC flare, now presenting with sepsis 2/2 C. diff colitis after failure of outpatient antibiotics.

## 2020-08-03 NOTE — DISCHARGE NOTE PROVIDER - CARE PROVIDER_API CALL
Eri Beltran  GASTROENTEROLOGY  [address] [place] [address]  Phone: [phone]  Fax: [phone]  Established Patient  Follow Up Time: 1 week    Xi Quinonez)  Internal Medicine  [address] [place] [address]  Phone: [phone]  Fax: [phone]  Established Patient  Follow Up Time: 1 week    Obie Eddy  CARDIAC ELECTROPHYSIOLOGY  [address] [place] [address]  Phone: [phone]  Fax: [phone]  Established Patient  Follow Up Time: Routine Eri Beltran  GASTROENTEROLOGY  [address] [id number]  Phone: [phone]  Fax: [phone]  Established Patient  Follow Up Time: 1 week    Xi Quinonez)  Internal Medicine  [address] [place] [address]  Phone: [phone]  Fax: [phone]  Established Patient  Follow Up Time: 1 week    Obie Eddy  CARDIAC ELECTROPHYSIOLOGY  [address] [place] [address]  Phone: [phone]  Fax: [phone]  Established Patient  Follow Up Time: Routine    Tom Chery  GASTROENTEROLOGY  [address] [place] [address]  Phone: [phone]  Fax: [phone]  Follow Up Time: 1 week Eri Beltran  GASTROENTEROLOGY  [address] [place] [address]  Phone: [phone]  Fax: [phone]  Established Patient  Follow Up Time: 1 week    Xi Quinonez)  Internal Medicine  [address] [place] [address]  Phone: [phone]  Fax: [phone]  Established Patient  Follow Up Time: 1 week    Obei Eddy  CARDIAC ELECTROPHYSIOLOGY  [address] [place] [address]  Phone: [phone]  Fax: [phone]  Established Patient  Follow Up Time: Routine    Tom Chery  GASTROENTEROLOGY  [address] [place] [address]  Phone: [phone]  Fax: [phone]  Follow Up Time: 1 week    Salvador Baron  COLON/RECTAL SURGERY  [address] [place] [address]  Phone: [phone]  Fax: [phone]  Follow Up Time: 1 week Eri Beltran  GASTROENTEROLOGY  [address] [place] [address]  Phone: [phone]  Fax: [phone]  Established Patient  Follow Up Time: 1 week    Xi Quinonez)  Internal Medicine  [address] [place] [address]  Phone: [phone]  Fax: [phone]  Established Patient  Follow Up Time: 1 week    Obie Eddy  CARDIAC ELECTROPHYSIOLOGY  [address] [place] [address]  Phone: [phone]  Fax: [phone]  Established Patient  Follow Up Time: Routine    Tom Chery  GASTROENTEROLOGY  [address] [place] [address]  Phone: [phone]  Fax: [phone]  Follow Up Time: 1 week    Salvador Baron  COLON/RECTAL SURGERY  [address] [place] [address]  Phone: [phone]  Fax: [phone]  Follow Up Time: 1 week

## 2020-08-03 NOTE — DISCHARGE NOTE PROVIDER - NSDCCPCAREPLAN_GEN_ALL_CORE_FT
PRINCIPAL DISCHARGE DIAGNOSIS  Diagnosis: Clostridium difficile colitis  Assessment and Plan of Treatment: You were recently diagnosed with C. Diff, which you may have acquired while previously in the hospital for your ulcerative colitis. Although you were treataed with antibiotics, you continued to have symptoms. We started you on a different antibiotic called vancomycin. Please continue to take this antibiotic as prescribed 4 times daily for 10 days ending on 08/12 and follow up with your PCP and GI regarding this issue. If you experience increasing abdominal pain, fevers, significant blood in stool, weakness, dizzyness, or light-headedness, please call your PCP/GI and/or return to the hospital.      SECONDARY DISCHARGE DIAGNOSES  Diagnosis: Ulcerative colitis  Assessment and Plan of Treatment: You were recently in the hospital in May for presumed ulcerative colitis exhaserbation. Imaging showed inflammation of your colon, which was likely due to a combination of C.Diff plus ulcerative colitis. You should follow up with your GI doctor within one week regarding managment of your ulcerative colitis.    Diagnosis: Hypertension, Essential  Assessment and Plan of Treatment: During your stay, we held your losartan because we didn't want your blood pressure to drop too low. Please restart this medication when you leave the hospital and follow up with your PCP and cardiologist outpatient. PRINCIPAL DISCHARGE DIAGNOSIS  Diagnosis: Clostridium difficile colitis  Assessment and Plan of Treatment: You were recently diagnosed with C. Diff, which you may have acquired while previously in the hospital for your ulcerative colitis. Although you were treataed with antibiotics, you continued to have symptoms. We started you on a different antibiotic called vancomycin in addition to an IV form of the flagyl, which you had previously taken orally. Please continue to take this antibiotic as prescribed 4 times daily for 10 days ending on 08/15 and follow up with your PCP and GI regarding this issue. If you experience increasing abdominal pain, fevers, significant blood in stool, weakness, dizzyness, or light-headedness, please call your PCP/GI and/or return to the hospital.      SECONDARY DISCHARGE DIAGNOSES  Diagnosis: Ulcerative colitis  Assessment and Plan of Treatment: You were recently in the hospital in May for presumed ulcerative colitis exhaserbation. Imaging showed inflammation of your colon, which was likely due to a combination of C.Diff plus ulcerative colitis. Your ulcerative colitis was evaluated in the hospital via a flexible sigmoidoscopy, which is a scope which can visualize the terminal aspect of your colon. You should follow up with your GI doctor within one week regarding managment of your ulcerative colitis.    Diagnosis: Hypertension, Essential  Assessment and Plan of Treatment: During your stay, we held your losartan because we didn't want your blood pressure to drop too low. Please restart this medication when you leave the hospital and follow up with your PCP and cardiologist outpatient. Hold this medication if you are feeling dizzy or light-headed and/or your blood pressure is low and consult your cardiologist PRINCIPAL DISCHARGE DIAGNOSIS  Diagnosis: Clostridium difficile colitis  Assessment and Plan of Treatment: You were recently diagnosed with C. Diff, which you may have acquired while previously in the hospital for your ulcerative colitis. Although you were treataed with antibiotics, you continued to have symptoms. We started you on a different antibiotic called vancomycin in addition to an IV form of the flagyl, which you had previously taken orally. Please continue to take this antibiotic as prescribed 4 times daily for 10 days ending on 08/15 and follow up with your PCP and GI regarding this issue. If you experience increasing abdominal pain, fevers, significant blood in stool, weakness, dizzyness, or light-headedness, please call your PCP/GI and/or return to the hospital.      SECONDARY DISCHARGE DIAGNOSES  Diagnosis: Colon adenocarcinoma  Assessment and Plan of Treatment: Biopsies taken during your sigmodoscopy showed a cancer of your colon, called adenocarcinoma. Please follow up with GI/Oncology regarding further details and treatment plan for this condition.    Diagnosis: Ulcerative colitis  Assessment and Plan of Treatment: You were recently in the hospital in May for presumed ulcerative colitis exhaserbation. Imaging showed inflammation of your colon, which was likely due to a combination of C.Diff plus ulcerative colitis. Your ulcerative colitis was evaluated in the hospital via a flexible sigmoidoscopy, which is a scope which can visualize the terminal aspect of your colon. You should follow up with your GI doctor within one week regarding managment of your ulcerative colitis.    Diagnosis: Hypertension, Essential  Assessment and Plan of Treatment: During your stay, we held your losartan because we didn't want your blood pressure to drop too low. Please restart this medication when you leave the hospital and follow up with your PCP and cardiologist outpatient. Hold this medication if you are feeling dizzy or light-headed and/or your blood pressure is low and consult your cardiologist PRINCIPAL DISCHARGE DIAGNOSIS  Diagnosis: Clostridium difficile colitis  Assessment and Plan of Treatment: You presented to the hospital with a persistant diarrheal infection called clostridium difficile (c.diff) despite outpatient treatement with the antibiotic flagyl. In the hospital, you were treated with vancomycin and flagyl and your diarrhea and symptoms improved, and you will be discharged on a taper of vancomycin. For the vancomycin taper, you will take 125mg (which is 2.5ml of the 50mg/1ml solution) every 8 hours for one week, then every 12 hours for one week, then daily for one week, then every other day for one week, and finally every 3 days for 2 weeks. You will follow up with your primary doctor within one week of discharge. Of note, due to your diarrhea, your potassium is slightly low. Therefore, we prescribed 20meq of potassium daily for one week, and you should have your potassium checked in one week.   If you experience increasing abdominal pain, fevers, significant blood in stool, weakness, dizzyness, or light-headedness, please call your PCP/GI and/or return to the hospital.      SECONDARY DISCHARGE DIAGNOSES  Diagnosis: Colon adenocarcinoma  Assessment and Plan of Treatment: During your stay, you had a biopsy taken during your sigmodoscopy that  showed a cancer of your colon, called adenocarcinoma. A specimen was also sent to your son who is a [hysician in [place] for a second opinion. You were also evaluated by colorectal surgery, and will foloow up after discharge for further discussion about your options. Please follow up with Gastroenterology and Oncology regarding further details and treatment plan for this condition.    Diagnosis: Hypertension, Essential  Assessment and Plan of Treatment: During your stay, we held your losartan because of your diarrhea and your blood pressure was in the normal range. Therefore, you will not continue this medication after discharge, and will follow up with your primary doctor regarding when to restart this medication. cardiologist    Diagnosis: Ulcerative colitis  Assessment and Plan of Treatment: You were recently in the hospital in May colitis exacerbation. Imaging during this admission showed inflammation of your colon, which was likely due to a combination of C.Diff plus ulcerative colitis. Your ulcerative colitis was evaluated in the hospital via a flexible sigmoidoscopy, which is a an endoscope which can visualize the terminal aspect of your colon. You should follow up with your GI doctor within one week regarding managment of your ulcerative colitis.

## 2020-08-03 NOTE — H&P ADULT - ATTENDING COMMENTS
Patient assigned to me by night hospitalist in charge for management and care for patient for this evening only. Care to be resumed by day hospitalist at 08:00 in the morning and thereafter.     Case presented by Dr. Segura (PGY2) and thereafter the patient was examined at bedside. NAD, cardiopulmonary benign, soft, mildly tender abdomen, not rigid and no rebound.    86F w/ UC, hx of SVT on sotalol, and recent diagnosis of C. difficile colitis as outpatient admitted to medicine for sepsis 2/2 c.diff colitis.  - c/w PO vancomycin and IV flagyl overnight. GI consult by Dr. Segura. blood culture collected. lactate wnl. IV fluid given. Since initiation of therapy patient feels improved and on my exam is nontoxic.      I was physically present for the key portions of the evaluation & management service provided. I agree with the above history, physical, and plan unless otherwise noted within the attending attestation.    Jaciel Agosto MD  Medicine Attending  Department of Hospital Medicine  pager: [phone] (available from 20:00 to 08:00)

## 2020-08-03 NOTE — CONSULT NOTE ADULT - ASSESSMENT
86F PMH ulcerative colitis (on asacol 1600 TID), external hemorrhoids, SVT on sotolol, hypothyroid, HTN, HLD, admitted 5/15 - 5/20/20 for UC flare treated with IV steroids and d/c on prednisone, now presenting c diff colitis vs UC flare.       Impression:  #C diff colitis 86F PMH ulcerative colitis (on asacol 1600 TID), external hemorrhoids, SVT on sotolol, hypothyroid, HTN, HLD, admitted 5/15 - 5/20/20 for UC flare treated with IV steroids and d/c on prednisone, now presenting c diff colitis vs UC flare.       Impression:  #C diff colitis - severe disease with WBC 30k, fever, pancolitis on imaging, normal creatinine.   # Ulcerative colitis - recently on steroids for flare in May, now on PO mesalamine.          Recommendation:            Cheryl Stanley PGY-4  Gastroenterology Fellow  Pager #[phone] or [phone]  Please call [employer] service for on-call GI fellow ([phone]) after 5pm and before 8am, and on weekends. 86F PMH ulcerative colitis (on asacol 1600 TID), external hemorrhoids, SVT on sotolol, hypothyroid, HTN, HLD, admitted 5/15 - 5/20/20 for UC flare treated with IV steroids and d/c on prednisone, now presenting c diff colitis vs UC flare.       Impression:  #C diff colitis - WBC 30k, fever, pancolitis on imaging however normal creatinine.   # Ulcerative colitis - recently on steroids for flare in May, now on PO mesalamine.          Recommendation:            Cheryl Stanley PGY-4  Gastroenterology Fellow  Pager #[phone] or [phone]  Please call [employer] service for on-call GI fellow ([phone]) after 5pm and before 8am, and on weekends. 86F PMH ulcerative colitis (on asacol 1600 TID), external hemorrhoids, SVT on sotolol, hypothyroid, HTN, HLD, admitted 5/15 - 5/20/20 for UC flare treated with IV steroids and d/c on prednisone, now presenting c diff colitis vs UC flare.       Impression:  #C diff colitis - WBC 30k, fever, pancolitis on imaging; normal creatinine.   # Ulcerative colitis - recently on steroids for flare in May, now on PO mesalamine.          Recommendation:  - c/w PO vanc and IV flagyl given degree of leukocytosis and degree of pancolitis on imaging  - if any decompensation, hypotension, worsening leukocytosis or creatinine, rising lactate - will need surgery consultation  - monitor BM frequency and consistency  - check hep panel and quant gold in case of need for biologic therapy      incomplete    Cheryl Stanley PGY-4  Gastroenterology Fellow  Pager #[phone] or [phone]  Please call an[employer] service for on-call GI fellow ([phone]) after 5pm and before 8am, and on weekends. 86F PMH ulcerative colitis (on asacol 1600 TID), external hemorrhoids, SVT on sotolol, hypothyroid, HTN, HLD, admitted 5/15 - 5/20/20 for UC flare treated with IV steroids and d/c on prednisone, now presenting c diff colitis vs UC flare.       Impression:  #C diff colitis - WBC 30k, fever, pancolitis on imaging; normal creatinine.   # Ulcerative colitis - recently on steroids for flare in May, now on PO mesalamine.          Recommendation:  - c/w PO vanc and IV flagyl given degree of leukocytosis and degree of pancolitis on imaging  - if any decompensation, hypotension, worsening leukocytosis or creatinine, rising lactate - will need surgery consultation  - monitor BM frequency and consistency  - check hep panel and quant gold in case of need for biologic therapy      Cheryl Stanley PGY-4  Gastroenterology Fellow  Pager #[phone] or [phone]  Please call anwering service for on-call GI fellow ([phone]) after 5pm and before 8am, and on weekends.

## 2020-08-03 NOTE — H&P ADULT - NSHPLABSRESULTS_GEN_ALL_CORE
Personally reviewed labs, imaging, EKG    LABS  CBC 08-02-20 @ 19:26                        9.7    36.35 )-----------( 317                   31.8     Hgb trend: 9.7 <--   WBC trend: 36.35 <--     CMP 08-02-20 @ 19:26    140  |  103  |  16  ----------------------------<  114<H>  3.4<L>   |  21<L>  |  0.80    Ca    8.7      08-02-20 @ 19:26    TPro  6.5  /  Alb  3.8  /  TBili  0.6  /  DBili  x   /  AST  24  /  ALT  11  /  AlkPhos  45  08-02    Serum Cr trend: 0.80 <--     VBG 08-02-20 @ 19:26 - pH:       | pCO2:       | pO2:       | Lactate: 1.7    PT/INR - ( 02 Aug 2020 19:26 )   PT: 13.5 sec;   INR: 1.14 ratio         PTT - ( 02 Aug 2020 19:26 ):27.8 sec    Cardiac Markers         MICRO      EKG      IMAGING    < from: CT Abdomen and Pelvis w/ IV Cont (08.02.20 @ 21:28) >    Pancolitis which could be secondary to the patient's known diagnosis of ulcerative colitis or due to an infectious etiology.    < end of copied text > Personally reviewed labs, imaging, EKG    LABS  CBC 08-02-20 @ 19:26                        9.7    36.35 )-----------( 317                   31.8     Hgb trend: 9.7 <--   WBC trend: 36.35 <--     CMP 08-02-20 @ 19:26    140  |  103  |  16  ----------------------------<  114<H>  3.4<L>   |  21<L>  |  0.80    Ca    8.7      08-02-20 @ 19:26    TPro  6.5  /  Alb  3.8  /  TBili  0.6  /  DBili  x   /  AST  24  /  ALT  11  /  AlkPhos  45  08-02    Serum Cr trend: 0.80 <--     VBG 08-02-20 @ 19:26 - pH:       | pCO2:       | pO2:       | Lactate: 1.7    PT/INR - ( 02 Aug 2020 19:26 )   PT: 13.5 sec;   INR: 1.14 ratio         PTT - ( 02 Aug 2020 19:26 ):27.8 sec    MICRO  Clostridium difficile GD Toxins A&amp;B, EIA:   Positive (08.02.20 @ 20:13)    BCx pending    EKG      IMAGING    < from: CT Abdomen and Pelvis w/ IV Cont (08.02.20 @ 21:28) >    Pancolitis which could be secondary to the patient's known diagnosis of ulcerative colitis or due to an infectious etiology.    < end of copied text > Personally reviewed labs, imaging, EKG    LABS  CBC 08-02-20 @ 19:26                        9.7    36.35 )-----------( 317                   31.8     Hgb trend: 9.7 <--   WBC trend: 36.35 <--     CMP 08-02-20 @ 19:26    140  |  103  |  16  ----------------------------<  114<H>  3.4<L>   |  21<L>  |  0.80    Ca    8.7      08-02-20 @ 19:26    TPro  6.5  /  Alb  3.8  /  TBili  0.6  /  DBili  x   /  AST  24  /  ALT  11  /  AlkPhos  45  08-02    Serum Cr trend: 0.80 <--     VBG 08-02-20 @ 19:26 - pH:       | pCO2:       | pO2:       | Lactate: 1.7    PT/INR - ( 02 Aug 2020 19:26 )   PT: 13.5 sec;   INR: 1.14 ratio         PTT - ( 02 Aug 2020 19:26 ):27.8 sec    MICRO  Clostridium difficile GDH Toxins A&amp;B, EIA:   Positive (08.02.20 @ 20:13)    BCx pending    EKG: NSR 88, no stemi appreciated    CT abdomen does not demonstrate bowel obstruction on my review  IMAGING    < from: CT Abdomen and Pelvis w/ IV Cont (08.02.20 @ 21:28) >    Pancolitis which could be secondary to the patient's known diagnosis of ulcerative colitis or due to an infectious etiology.    < end of copied text >

## 2020-08-03 NOTE — CHART NOTE - NSCHARTNOTEFT_GEN_A_CORE
Sandy Rosenberg PGY-3  MAR  Dept. Internal Medicine    TO BE COMPLETED WITHIN 6 HOURS OF INITIAL ASSESSMENT:    For use in patients that have 2 sepsis criteria and new organ dysfunction   •	New or increased oxygen requirement  •	Creatinine >2mg/dL  •	Bilirubin>2mg/dL  •	Platelet <100,00/mm3  •	INR >1.5, PTT>60  •	Lactate >2    If patient persistent hypotension (SBP<90) or any lactate >4 then provider evaluation (Physician/PA/NP) within 30 minutes of bolus completion is required.    Vital Signs Last 24 Hrs  T(C): 36.9 (03 Aug 2020 00:05), Max: 38.8 (02 Aug 2020 15:34)  T(F): 98.5 (03 Aug 2020 00:05), Max: 101.8 (02 Aug 2020 15:34)  HR: 92 (03 Aug 2020 00:05) (92 - 92)  BP: 112/59 (03 Aug 2020 00:05) (112/59 - 130/69)  BP(mean): --  RR: 18 (03 Aug 2020 00:05) (18 - 22)  SpO2: 98% (03 Aug 2020 00:05) (96% - 98%)  		  LUNGS:  [  ]Clear bilaterally [  ] Wheeze [  ] Rhonchi [  ] Rales [X] Crackles; Other:  HEART: [X]RRR [  ] No murmur[  ]  Normal S1S2[  ] Tachycardia;  Other:  CAPILLARY REFiLL:  	Fingers: [  ] less than 2 seconds [  ] more than 2 seconds                                           Toes: [X]  less than 2 seconds [  ] more than 2 seconds   PERIPHERAL PULSES:  Radial: [X] Palpable  [  ]  non-palpable                                         Dorsalis Pedis: [  ] Palpable  [  ] non-palpable                                         Posterior Tibial: [  ] Palpable  [  ] non-palpable                                          Other:  SKIN:   [  ]  Diaphoretic  [  ]  mottling  [  ]  Cold extremities  [  ]  Warm [  ]  Dry                      Other:    BEDSIDE ULTRASOUND FINDINGS (IF APPLICABLE):    Labs:  02 Aug 2020 19:26    140    |  103    |  16     ----------------------------<  114    3.4     |  21     |  0.80     Ca    8.7        02 Aug 2020 19:26    TPro  6.5    /  Alb  3.8    /  TBili  0.6    /  DBili  x      /  AST  24     /  ALT  11     /  AlkPhos  45     02 Aug 2020 19:26                          9.7    36.35 )-----------( 317      ( 02 Aug 2020 19:26 )             31.8     PT/INR - ( 02 Aug 2020 19:26 )   PT: 13.5 sec;   INR: 1.14 ratio         PTT - ( 02 Aug 2020 19:26 )  PTT:27.8 sec  Lactate:    Plan (orders must be placed in EMR):     [  ]  Check Repeat Lactate   [X]  No change in current plan  [  ]  Start Vasopressors:  [  ]  Repeat Fluid Bolus:  [  ] other:    Care Discussed with Consultants/Other Providers [X] YES  [ ] NO

## 2020-08-03 NOTE — PROGRESS NOTE ADULT - PROBLEM SELECTOR PLAN 1
Unclear if this represents treatment failure or recurrence, given symptoms did not resolve fully after initial course of flagyl. Given leukocytosis near 30, progression of disease to include fever, reasonable to treat with both oral vanco and IV flagyl, although typically only vanco is used after treatment failure with flagyl  -C/w vanco 125mg q6h and flagyl 500mg q8h  -Trend BMP, replete lytes PRN  -DASH diet as tolerated  -No concern for complications including toxic megacolon given no distension or peritoneal signs on exam, lactate wnl  -GI consult emailed Unclear if this represents treatment failure or recurrence, given symptoms did not resolve fully after initial course of flagyl. Given leukocytosis as high as 36, progression of disease to include fever, IV + oral abx regimen initiated.   -CT abd/pelvis showing pancolitis without lymphadenopathy.   -C/w vanco PO 125mg q6h and flagyl IV 500mg q8h  -WBC improving 36->30  -Trend BMP, replete lytes PRN  -Started on clear liquid diet today to give patient's bowels some rest given pancolitis, although patient tolerating full diet at home. Will advance as tolerated.   -No concern for complications including toxic megacolon given no distension or peritoneal signs on exam, lactate wnl  -GI consulted, will appreciate recs. Unclear if this represents treatment failure or recurrence, given symptoms did not resolve fully after initial course of flagyl.  -Started on vanco PO 125mg q6h and flagyl IV 500mg q8h  -Given patient was not properly treated during first course (vancomycin or fidaxomycin are first line) and non-complicated C. Diff (no hypotension, normal lactate, ect), Will D/C IV flagyl, continue patient on PO vancomycin.   -CT abd/pelvis showing pancolitis without lymphadenopathy.   -WBC improving 36->30  -Trend BMP, replete lytes PRN. Will f/u CBC, lactate, monitor for toxic signs.   -Started on clear liquid diet today to give patient's bowels some rest given pancolitis, although patient tolerating full diet at home. Will advance as tolerated.   -No concern for complications including toxic megacolon given no distension or peritoneal signs on exam, lactate wnl  -GI consulted, will appreciate recs.

## 2020-08-03 NOTE — PROGRESS NOTE ADULT - SUBJECTIVE AND OBJECTIVE BOX
PROGRESS NOTE:   Authored by Jose D Gonzales MD, PGY1 [facility] Pager [phone] [facility] pager [phone]    Patient is a 86y old  Female who presents with a chief complaint of 86F sent in by GI for persistent diarrhea (03 Aug 2020 07:45)      SUBJECTIVE / OVERNIGHT EVENTS:     REVIEW OF SYSTEMS:    CONSTITUTIONAL: No weakness, fevers or chills  EYES/ENT: No visual changes;  No vertigo or throat pain   NECK: No pain or stiffness  RESPIRATORY: No cough, wheezing, hemoptysis; No shortness of breath  CARDIOVASCULAR: No chest pain or palpitations  GASTROINTESTINAL: No abdominal or epigastric pain. No nausea, vomiting, or hematemesis; No diarrhea or constipation. No melena or hematochezia.  GENITOURINARY: No dysuria, frequency or hematuria  NEUROLOGICAL: No numbness or weakness  SKIN: No itching, rashes    MEDICATIONS  (STANDING):  enoxaparin Injectable 40 milliGRAM(s) SubCutaneous daily  metroNIDAZOLE  IVPB      metroNIDAZOLE  IVPB 500 milliGRAM(s) IV Intermittent every 8 hours  potassium phosphate IVPB 15 milliMole(s) IV Intermittent once  sotalol 40 milliGRAM(s) Oral every 12 hours  vancomycin    Solution 125 milliGRAM(s) Oral every 6 hours    MEDICATIONS  (PRN):  acetaminophen   Tablet .. 650 milliGRAM(s) Oral every 6 hours PRN Temp greater or equal to 38C (100.4F), Mild Pain (1 - 3)      CAPILLARY BLOOD GLUCOSE        I&O's Summary      PHYSICAL EXAM:  Vital Signs Last 24 Hrs  T(C): 37.9 (03 Aug 2020 05:21), Max: 38.8 (02 Aug 2020 15:34)  T(F): 100.3 (03 Aug 2020 05:21), Max: 101.8 (02 Aug 2020 15:34)  HR: 82 (03 Aug 2020 05:21) (82 - 92)  BP: 106/62 (03 Aug 2020 05:21) (106/62 - 130/69)  BP(mean): --  RR: 18 (03 Aug 2020 05:21) (18 - 22)  SpO2: 96% (03 Aug 2020 05:21) (96% - 98%)    CONSTITUTIONAL: NAD, well-developed  RESPIRATORY: Normal respiratory effort; lungs are clear to auscultation bilaterally  CARDIOVASCULAR: Regular rate and rhythm, normal S1 and S2, no murmur/rub/gallop; No lower extremity edema; Peripheral pulses are 2+ bilaterally  ABDOMEN: Nontender to palpation, normoactive bowel sounds, no rebound/guarding; No hepatosplenomegaly  MUSCLOSKELETAL: no clubbing or cyanosis of digits; no joint swelling or tenderness to palpation  PSYCH: A+O to person, place, and time; affect appropriate  NEURO: Non-focal, no tremors  SKIN: No rashes    LABS:                        8.9    30.39 )-----------( 265      ( 03 Aug 2020 07:16 )             29.0     08-03    141  |  108  |  12  ----------------------------<  117<H>  3.6   |  16<L>  |  0.73    Ca    7.7<L>      03 Aug 2020 07:16  Phos  1.5     08-03  Mg     1.8     08-03    TPro  5.5<L>  /  Alb  3.1<L>  /  TBili  0.4  /  DBili  x   /  AST  18  /  ALT  11  /  AlkPhos  44  08-03    PT/INR - ( 02 Aug 2020 19:26 )   PT: 13.5 sec;   INR: 1.14 ratio         PTT - ( 02 Aug 2020 19:26 )  PTT:27.8 sec            RADIOLOGY & ADDITIONAL TESTS:  No new imaging or tests    COORDINATION OF CARE:  Care Discussed with Consultants/Other Providers [Y/N]:  Prior or Outpatient Records Reviewed [Y/N]: PROGRESS NOTE:   Authored by Jose D Gonzales MD, PGY1 [facility] Pager [phone] [facility] pager [phone]    Patient is a 86y old  Female who presents with a chief complaint of 86F sent in by GI for persistent diarrhea (03 Aug 2020 07:45)      SUBJECTIVE / OVERNIGHT EVENTS: No acute events overnight. Patient notes that she is feeling a little stronger today, but is just stressed out about being in the hospital again. She notes cramping lower abdominal pain prior to having bowel movements that is relieved with bowel movements, but no abdominal pain otherwise. Patient notes continued loose, smelly BMs multiple times/day. Notes small amount of blood that she attributes to her hemorrhoids. This bleeding is unlike the bloody stools she was having previously. Patient denies feeling dizzy or light-headed. Notes fever was as high as 102 at home on Saturday. Felt slightly feverish this morning but not currently. Denies chest pain, SOB, nausea or vomiting, dysuria or urinary frequency. Notes swelling in her legs which has been present since she has been on steroids, which she stopped taking about 2 weeks ago (had been on an oral taper since her last hospital course in late May).     REVIEW OF SYSTEMS:    CONSTITUTIONAL: +weakness, fevers. No chills  EYES/ENT: No visual changes;  No vertigo or throat pain   NECK: No pain or stiffness  RESPIRATORY: No cough, wheezing, hemoptysis; No shortness of breath  CARDIOVASCULAR: No chest pain or palpitations  GASTROINTESTINAL: +crampy abdominal pain. No nausea, vomiting, or hematemesis; +diarrhea. +small amounts of blood in stool.   GENITOURINARY: No dysuria, frequency or hematuria  NEUROLOGICAL: No numbness or weakness  SKIN: No itching, rashes. +swelling in leg.    MEDICATIONS  (STANDING):  enoxaparin Injectable 40 milliGRAM(s) SubCutaneous daily  metroNIDAZOLE  IVPB      metroNIDAZOLE  IVPB 500 milliGRAM(s) IV Intermittent every 8 hours  potassium phosphate IVPB 15 milliMole(s) IV Intermittent once  sotalol 40 milliGRAM(s) Oral every 12 hours  vancomycin    Solution 125 milliGRAM(s) Oral every 6 hours    MEDICATIONS  (PRN):  acetaminophen   Tablet .. 650 milliGRAM(s) Oral every 6 hours PRN Temp greater or equal to 38C (100.4F), Mild Pain (1 - 3)      CAPILLARY BLOOD GLUCOSE        I&O's Summary      PHYSICAL EXAM:  Vital Signs Last 24 Hrs  T(C): 37.9 (03 Aug 2020 05:21), Max: 38.8 (02 Aug 2020 15:34)  T(F): 100.3 (03 Aug 2020 05:21), Max: 101.8 (02 Aug 2020 15:34)  HR: 82 (03 Aug 2020 05:21) (82 - 92)  BP: 106/62 (03 Aug 2020 05:21) (106/62 - 130/69)  BP(mean): --  RR: 18 (03 Aug 2020 05:21) (18 - 22)  SpO2: 96% (03 Aug 2020 05:21) (96% - 98%)    CONSTITUTIONAL: NAD, well-developed  RESPIRATORY: Normal respiratory effort; lungs are clear to auscultation bilaterally  CARDIOVASCULAR: Regular rate and rhythm, normal S1 and S2, no murmur/rub/gallop;  Peripheral pulses are 2+ bilaterally  ABDOMEN: Soft, nondistended. +tenderness to light and deep palpation in RUQ and LLQ without rebound or guarding. normoactive bowel sounds. No hepatosplenomegaly  MUSCLOSKELETAL: no clubbing or cyanosis of digits; 2+ pitting edema with dimpling of left leg with mild tenderness to palpation of lower legs b/l.   PSYCH: A+O to person, place, and time; affect appropriate  NEURO: Non-focal, no tremors  SKIN: No rashes.     LABS:                        8.9    30.39 )-----------( 265      ( 03 Aug 2020 07:16 )             29.0     08-03    141  |  108  |  12  ----------------------------<  117<H>  3.6   |  16<L>  |  0.73    Ca    7.7<L>      03 Aug 2020 07:16  Phos  1.5     08-03  Mg     1.8     08-03    TPro  5.5<L>  /  Alb  3.1<L>  /  TBili  0.4  /  DBili  x   /  AST  18  /  ALT  11  /  AlkPhos  44  08-03    PT/INR - ( 02 Aug 2020 19:26 )   PT: 13.5 sec;   INR: 1.14 ratio         PTT - ( 02 Aug 2020 19:26 )  PTT:27.8 sec            RADIOLOGY & ADDITIONAL TESTS:  No new imaging or tests    COORDINATION OF CARE:  Care Discussed with Consultants/Other Providers [Y/N]:  Prior or Outpatient Records Reviewed [Y/N]:

## 2020-08-03 NOTE — DISCHARGE NOTE PROVIDER - CARE PROVIDERS DIRECT ADDRESSES
,[email or web address],[email or web address] ,[email or web address] [place] [facility]\""[email or web address].[facility],[email or web address].\[facility] [place] [facility]\""[email or web address].[facility] ,[email or web address],[email or web address] JoseiVilka.[facility],[email or web address],[email or web address],[email or web address]

## 2020-08-03 NOTE — H&P ADULT - NSHPPHYSICALEXAM_GEN_ALL_CORE
T(F): 97.9 (08-03-20 @ 02:04), Max: 101.8 (08-02-20 @ 15:34)  HR: 89 (08-03-20 @ 02:04) (89 - 92)  BP: 119/74 (08-03-20 @ 02:04) (112/59 - 130/69)  BP(mean): --  ABP: --  ABP(mean): --  RR: 18 (08-03-20 @ 02:04) (18 - 22)  SpO2: 97% (08-03-20 @ 02:04) (96% - 98%)    GENERAL: NAD, lying in bed comfortably  HEAD:  Atraumatic, Normocephalic  EYES: EOMI, PERRLA, conjunctiva and sclera clear  ENT: Moist mucous membranes  NECK: Supple, No JVD  CHEST/LUNG: Clear to auscultation bilaterally; No rales, rhonchi, wheezing, or rubs. Unlabored respirations  HEART: Regular rate and rhythm; No murmurs, rubs, or gallops  ABDOMEN: Bowel sounds present; Soft, Nontender, Nondistended. No hepatomegaly  EXTREMITIES:  2+ Peripheral Pulses, brisk capillary refill. No clubbing, cyanosis, or edema  NERVOUS SYSTEM:  Alert & Oriented X3, speech clear. No deficits   MSK: FROM all 4 extremities, full and equal strength  SKIN: No rashes or lesions T(F): 97.9 (08-03-20 @ 02:04), Max: 101.8 (08-02-20 @ 15:34)  HR: 89 (08-03-20 @ 02:04) (89 - 92)  BP: 119/74 (08-03-20 @ 02:04) (112/59 - 130/69)  BP(mean): --  ABP: --  ABP(mean): --  RR: 18 (08-03-20 @ 02:04) (18 - 22)  SpO2: 97% (08-03-20 @ 02:04) (96% - 98%)    GENERAL: NAD, lying in bed comfortably  HEAD:  Atraumatic, Normocephalic  EYES: EOMI, PERRLA, conjunctiva and sclera clear  ENT: Moist mucous membranes  NECK: Supple, No JVD  CHEST/LUNG: Clear to auscultation bilaterally; No rales, rhonchi, wheezing, or rubs. Unlabored respirations  HEART: Regular rate and rhythm; No murmurs, rubs, or gallops  ABDOMEN: Bowel sounds present; Soft, +TTP LLQ > LUQ, Nondistended. No hepatomegaly, no rebound/guarding  EXTREMITIES:  2+ Peripheral Pulses, brisk capillary refill. No clubbing, cyanosis, or edema  NERVOUS SYSTEM:  Alert & Oriented X3, speech clear. No deficits   MSK: FROM all 4 extremities, full and equal strength  SKIN: No rashes or lesions T(F): 97.9 (08-03-20 @ 02:04), Max: 101.8 (08-02-20 @ 15:34)  HR: 89 (08-03-20 @ 02:04) (89 - 92)  BP: 119/74 (08-03-20 @ 02:04) (112/59 - 130/69)  RR: 18 (08-03-20 @ 02:04) (18 - 22)  SpO2: 97% (08-03-20 @ 02:04) (96% - 98%) on RA    GENERAL: NAD, lying in bed comfortably  HEAD:  Atraumatic, Normocephalic  EYES: EOMI, PERRLA, conjunctiva and sclera clear  ENT: Moist mucous membranes  NECK: Supple, No JVD  CHEST/LUNG: Clear to auscultation bilaterally; No rales, rhonchi, wheezing, or rubs. Unlabored respirations  HEART: Regular rate and rhythm; No murmurs, rubs, or gallops  ABDOMEN: Bowel sounds present; Soft, mild +TTP LLQ > LUQ, Nondistended. No hepatomegaly, no rebound/guarding  EXTREMITIES:  2+ Peripheral Pulses, brisk capillary refill. No clubbing, cyanosis, or edema  NERVOUS SYSTEM:  Alert & Oriented X3, speech clear. No deficits   MSK: FROM all 4 extremities, full and equal strength  SKIN: No rashes or lesions

## 2020-08-03 NOTE — H&P ADULT - PROBLEM SELECTOR PLAN 1
Unclear if this represents treatment failure or recurrence, given symptoms did not resolve fully after initial course of flagyl. Given leukocytosis near 30, progression of disease to include fever, reasonable to treat with both oral vanco and IV flagyl, although typically only vanco is used after treatment failure with flagyl  -C/w vanco 125mg q6h and flagyl 500mg q8h  -Trend BMP, replete lytes PRN  -DASH diet as tolerated  -No concern for complications including toxic megacolon given no distension or peritoneal signs on exam, lactate wnl Unclear if this represents treatment failure or recurrence, given symptoms did not resolve fully after initial course of flagyl. Given leukocytosis near 30, progression of disease to include fever, reasonable to treat with both oral vanco and IV flagyl, although typically only vanco is used after treatment failure with flagyl  -C/w vanco 125mg q6h and flagyl 500mg q8h  -Trend BMP, replete lytes PRN  -DASH diet as tolerated  -No concern for complications including toxic megacolon given no distension or peritoneal signs on exam, lactate wnl  -GI consult emailed

## 2020-08-03 NOTE — H&P ADULT - NSHPREVIEWOFSYSTEMS_GEN_ALL_CORE
REVIEW OF SYSTEMS:  [ ] Unable to assess ROS because ______  CONSTITUTIONAL: No fever, chills, night sweats, or fatigue  EYES: No eye pain, visual disturbances, or discharge  ENMT:  No difficulty hearing, tinnitus, vertigo; No sinus or throat pain  NECK: No pain or stiffness  BREASTS: No pain, masses, or nipple discharge  RESPIRATORY: No cough, wheezing, or hemoptysis; No shortness of breath  CARDIOVASCULAR: No chest pain, palpitations, dizziness, or leg swelling  GASTROINTESTINAL: No abdominal or epigastric pain. No nausea, vomiting, or hematemesis; No diarrhea or constipation. No melena or hematochezia.  GENITOURINARY: No dysuria, frequency, hematuria, or incontinence  NEUROLOGICAL: No headaches, memory loss, loss of strength, numbness, or tremors  SKIN: No itching, burning, rashes, or lesions   LYMPH NODES: No enlarged glands  ENDOCRINE: No heat or cold intolerance; No hair loss  MUSCULOSKELETAL: No joint pain or swelling; No muscle, back, or extremity pain  PSYCHIATRIC: No depression, anxiety, mood swings, or difficulty sleeping  HEME/LYMPH: No easy bruising, or bleeding gums  ALLERGY AND IMMUNOLOGIC: No hives or eczema REVIEW OF SYSTEMS:  [ ] Unable to assess ROS because ______  CONSTITUTIONAL: No fever, chills, night sweats, or fatigue  EYES: No eye pain, visual disturbances, or discharge  ENMT:  No difficulty hearing, tinnitus, vertigo; No sinus or throat pain  NECK: No pain or stiffness  BREASTS: No pain, masses, or nipple discharge  RESPIRATORY: No cough, wheezing, or hemoptysis; No shortness of breath  CARDIOVASCULAR: No chest pain, palpitations, dizziness. +bilateral leg edema since starting prednisone  GASTROINTESTINAL: No abdominal or epigastric pain. No nausea, vomiting, or hematemesis; No diarrhea or constipation. No melena or hematochezia.  GENITOURINARY: No dysuria, frequency, hematuria, or incontinence  NEUROLOGICAL: No headaches, memory loss, loss of strength, numbness, or tremors  SKIN: No itching, burning, rashes, or lesions   LYMPH NODES: No enlarged glands  ENDOCRINE: No heat or cold intolerance; No hair loss  MUSCULOSKELETAL: No joint pain or swelling; No muscle, back, or extremity pain  PSYCHIATRIC: No depression, anxiety, mood swings, or difficulty sleeping  HEME/LYMPH: No easy bruising, or bleeding gums  ALLERGY AND IMMUNOLOGIC: No hives or eczema REVIEW OF SYSTEMS:    CONSTITUTIONAL: No subjective fever, chills, night sweats, or fatigue  EYES: No eye pain, visual disturbances, or discharge  ENMT:  No difficulty hearing, tinnitus, vertigo; No sinus or throat pain  NECK: No pain or stiffness  BREASTS: No pain, masses, or nipple discharge  RESPIRATORY: No cough, wheezing, or hemoptysis; No shortness of breath  CARDIOVASCULAR: No chest pain, palpitations, dizziness. +bilateral leg edema since starting prednisone  GASTROINTESTINAL: diarrhea+, some blood in stool+  GENITOURINARY: No dysuria, frequency, hematuria, or incontinence  NEUROLOGICAL: No headaches, memory loss, loss of strength, numbness, or tremors  SKIN: No itching, burning, rashes, or lesions   LYMPH NODES: No enlarged glands  ENDOCRINE: No heat or cold intolerance; No hair loss  MUSCULOSKELETAL: No joint pain or swelling; No muscle, back, or extremity pain  PSYCHIATRIC: No depression, anxiety, mood swings, or difficulty sleeping  HEME/LYMPH: No easy bruising, or bleeding gums  ALLERGY AND IMMUNOLOGIC: No hives or eczema

## 2020-08-03 NOTE — DISCHARGE NOTE PROVIDER - NSDCCAREPROVSEEN_GEN_ALL_CORE_FT
Christian, Jose D Vera, Jose Francisco Cosme Christian, Jose D Vera, Irene Kim, Jose Francisco  [facility] Medicine, Team 5

## 2020-08-03 NOTE — PROGRESS NOTE ADULT - PROBLEM SELECTOR PLAN 2
-S/p IVF in ED  -Treatment of C. diff as above  -F/u Bcx  - hold antihypertensives -S/p IVF in ED  -Treatment of C. diff as above  -F/u Bcx, Ucx, GI PCR/Cultures.   - hold antihypertensives

## 2020-08-03 NOTE — DISCHARGE NOTE PROVIDER - NSDCFUSCHEDAPPT_GEN_ALL_CORE_FT
WARD DICKSON ; 09/15/2020 ; NPP [facility] [address] WARD Mathews ; 09/23/2020 ; NPP Med GenInt [address] [facility] WARD DICKSON ; 09/15/2020 ; NPP [facility] [address] WARD Mathews ; 09/23/2020 ; NPP Med GenInt [address] [facility] WARD DICKSON ; 09/15/2020 ; NPP [facility] [address] WARD Mathews ; 09/23/2020 ; NPP Med GenInt [address] [facility] WARD DICKSON ; 09/15/2020 ; NPP [facility] [address] WARD Mathews ; 09/23/2020 ; NPP Med GenInt [address] [facility] WARD DICKSON ; 09/15/2020 ; NPP [facility] [address] WARD Mathews ; 09/23/2020 ; NPP Med GenInt [address] [facility] WARD DICKSON ; 09/15/2020 ; NPP [facility] [address] WARD Mathews ; 09/23/2020 ; NPP Med GenInt [address] [facility] WARD DICKSON ; 09/15/2020 ; NPP [facility] [address] WARD Mathews ; 09/23/2020 ; NPP Med GenInt [address] [facility] WARD DICKSON ; 09/15/2020 ; NPP [facility] [address] WARD Mathews ; 09/23/2020 ; NPP Med GenInt 8[address] [facility] WARD DICKSON ; 09/15/2020 ; NPP [facility] [address] WARD Mathews ; 09/23/2020 ; NPP Med GenInt [address] [facility] WARD DICKSON ; 09/15/2020 ; NPP [facility] [address] WARD Mathews ; 09/23/2020 ; NPP Med GenInt [address] [facility] WARD DICKSON ; 09/15/2020 ; NPP [facility] [address] WARD Mathews ; 09/23/2020 ; NPP Med GenInt [address] [facility]

## 2020-08-03 NOTE — DISCHARGE NOTE PROVIDER - NSDCMRMEDTOKEN_GEN_ALL_CORE_FT
Crestor 5 mg oral tablet: 1 tab(s) orally once a day  levothyroxine 50 mcg (0.05 mg) oral tablet: 1 tab(s) orally once a day  losartan 50 mg oral tablet: 1 tab(s) orally once a day  Multiple Vitamins oral tablet: 1 tab(s) orally once a day  sotalol 80 mg oral tablet: 0.5 tab(s) orally 2 times a day Crestor 5 mg oral tablet: 1 tab(s) orally once a day  levothyroxine 50 mcg (0.05 mg) oral tablet: 1 tab(s) orally once a day  losartan 50 mg oral tablet: 1 tab(s) orally once a day  Multiple Vitamins oral tablet: 1 tab(s) orally once a day  sotalol 80 mg oral tablet: 0.5 tab(s) orally 2 times a day  vancomycin 50 mg/mL oral liquid: 125mg (2.5mL) every 8 hours for one week, then  125 mg (2.5mL) every 12 hours for one week, then  125mg (2.5mL) every 24 hours for one week, then  125mg (2.5mL) every 48 hours for one week,  then   125mg (2.5mL) every 72 hours for two weeks,  then stop Crestor 5 mg oral tablet: 1 tab(s) orally once a day  levothyroxine 50 mcg (0.05 mg) oral tablet: 1 tab(s) orally once a day  Multiple Vitamins oral tablet: 1 tab(s) orally once a day  potassium chloride 20 mEq oral powder for reconstitution: 1 each orally once a day   sotalol 80 mg oral tablet: 0.5 tab(s) orally 2 times a day  vancomycin 50 mg/mL oral liquid: 125mg (2.5mL) every 8 hours for one week, then  125 mg (2.5mL) every 12 hours for one week, then  125mg (2.5mL) every 24 hours for one week, then  125mg (2.5mL) every 48 hours for one week,  then   125mg (2.5mL) every 72 hours for two weeks,  then stop

## 2020-08-03 NOTE — CONSULT NOTE ADULT - SUBJECTIVE AND OBJECTIVE BOX
Chief Complaint:  Patient is a 86y old  Female who presents with a chief complaint of 86F sent in by GI for persistent diarrhea (03 Aug 2020 01:55)      Interval Events: 86F PMH ulcerative colitis (on asacol 1600 TID), external hemorrhoids, SVT on sotolol, hypothyroid, HTN, HLD, admitted 5/15 - 5/20/20 for UC flare treated with IV steroids and d/c on prednisone, now presenting with diarrhea x 2.5 months. She's had 3-5 loose, brown, sometimes bloody, stools per day, increasing in frequency over time. She was started on mesalamine enema after discharge in May but patient experienced increased frequency of bloody BM and did not continue to use it. After stopping enemas she had improvement in BM in June. She continued on prednisone taper decreasing by 5mg/week. She was started on mesalamine  TID at that time. In Mid July, pt had telehealth visit with her GI doctor and described the above symptoms. A test for C. diff was sent and was positive on July 13 2020. Mesalamine was increased to 1600mg TID. Pt was given flagyl 250mg TID x 10d, which she completed, stating that he had no further bloody BM afterwards but continued to have the same consistency and frequency of BM.  2d prior to admission, pt experienced further progression of her symptoms with a fever to 101. She called her GI office at that time and was advised to present to the ED.     In ED, VS: Tmax 101.8, 92, 130/69, 22, 96% RA. Stool positive for Cdiff. Given 1.5L IVF, oral vanco, IV flagyl x1.         Allergies:  sulfa drugs (Unknown)      Hospital Medications:  acetaminophen   Tablet .. 650 milliGRAM(s) Oral every 6 hours PRN  enoxaparin Injectable 40 milliGRAM(s) SubCutaneous daily  metroNIDAZOLE  IVPB      metroNIDAZOLE  IVPB 500 milliGRAM(s) IV Intermittent every 8 hours  sotalol 40 milliGRAM(s) Oral every 12 hours  vancomycin    Solution 125 milliGRAM(s) Oral every 6 hours      PMHX/PSHX:  Colitis  Atrial fibrillation with rapid ventricular response  Hypothyroid  SVT (supraventricular tachycardia)  Lipoma of neck  Atrial Tachycardia  Hypercholesteremia  Hypertension, Essential  Elective surgery  S/P tonsillectomy  S/P D&C (status post dilation and curettage)  H/O: Hysterectomy      Family history:  Family history of Parkinson's disease (Sibling)  Family history of CVA  Family history of renal failure      ROS:     General:  No weight loss, fevers, chills, night sweats, fatigue   Eyes:  No vision changes  ENT:  No sore throat, pain, runny nose  CV:  No chest pain, palpitations, dizziness   Resp:  No SOB, cough, wheezing  GI:  See HPI  :  No burning with urination, hematuria  Muscle:  No pain, weakness  Neuro:  No weakness/tingling, memory problems  Psych:  No fatigue, insomnia, mood problems, depression  Heme:  No easy bruisability  Skin:  No rash, edema      PHYSICAL EXAM:     GENERAL:  Well developed, no distress  HEENT:  NC/AT,  conjunctivae clear, sclera anicteric  CHEST:  Full & symmetric excursion, no increased effort w/ respirations  HEART:  Regular rhythm & rate  ABDOMEN:  Soft, non-tender, non-distended  EXTREMITIES:  no LE  edema  SKIN:  No rash/erythema/ecchymoses/petechiae/wounds/jaundice  NEURO:  Alert, oriented    Vital Signs:  Vital Signs Last 24 Hrs  T(C): 37.9 (03 Aug 2020 05:21), Max: 38.8 (02 Aug 2020 15:34)  T(F): 100.3 (03 Aug 2020 05:21), Max: 101.8 (02 Aug 2020 15:34)  HR: 82 (03 Aug 2020 05:21) (82 - 92)  BP: 106/62 (03 Aug 2020 05:21) (106/62 - 130/69)  BP(mean): --  RR: 18 (03 Aug 2020 05:21) (18 - 22)  SpO2: 96% (03 Aug 2020 05:21) (96% - 98%)  Daily Height in cm: 152.4 (02 Aug 2020 15:34)    Daily     LABS:                        9.7    36.35 )-----------( 317      ( 02 Aug 2020 19:26 )             31.8     08-02    140  |  103  |  16  ----------------------------<  114<H>  3.4<L>   |  21<L>  |  0.80    Ca    8.7      02 Aug 2020 19:26    TPro  6.5  /  Alb  3.8  /  TBili  0.6  /  DBili  x   /  AST  24  /  ALT  11  /  AlkPhos  45  08-02    LIVER FUNCTIONS - ( 02 Aug 2020 19:26 )  Alb: 3.8 g/dL / Pro: 6.5 g/dL / ALK PHOS: 45 U/L / ALT: 11 U/L / AST: 24 U/L / GGT: x           PT/INR - ( 02 Aug 2020 19:26 )   PT: 13.5 sec;   INR: 1.14 ratio         PTT - ( 02 Aug 2020 19:26 )  PTT:27.8 sec        Imaging:  < from: CT Abdomen and Pelvis w/ IV Cont (08.02.20 @ 21:28) >  FINDINGS:  LOWER CHEST: Bibasilar linear atelectasis.    LIVER: Right hepatic subcentimeter hypodense lesion too small to characterize.  BILE DUCTS: Normal caliber.  GALLBLADDER: Within normal limits.  SPLEEN: Within normal limits.  PANCREAS: Within normal limits.  ADRENALS: Within normal limits.  KIDNEYS/URETERS: Left renal cyst. Bilateral renal subcentimeter hypodense lesions which are too small for accurate characterization. 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    BLADDER: Within normal limits.  REPRODUCTIVE ORGANS: Hysterectomy. No adnexal masses.    BOWEL: Extensive mural thickening extending from the cecum to the rectum with associated inflammatory change. Colonic diverticulosis. No bowel obstruction. Appendix not visualized. No secondary signs of appendicitis.  PERITONEUM: No ascites. Small mesenteric lymph nodes, predominantly in the left lower quadrant. No drainable fluid collection.  VESSELS: Within normal limits.  RETROPERITONEUM/LYMPH NODES: No lymphadenopathy.  ABDOMINAL WALL: Within normal limits.  BONES: Total left hip arthroplasty. L5-S1 degenerative changes.    IMPRESSION:  Pancolitis which could be secondary to the patient's known diagnosis of ulcerative colitis or due to an infectious etiology.    < end of copied text > Chief Complaint:  Patient is a 86y old  Female who presents with a chief complaint of 86F sent in by GI for persistent diarrhea (03 Aug 2020 01:55)      Interval Events: 86F PMH ulcerative colitis (on asacol 1600 TID), external hemorrhoids, SVT on sotolol, hypothyroid, HTN, HLD, admitted 5/15 - 5/20/20 for UC flare treated with IV steroids and d/c on prednisone, now presenting with diarrhea x 2.5 months. She was started on mesalamine enema after discharge in May but patient experienced increased frequency of bloody BM and did not continue to use it. After stopping enemas she had improvement in BM in June. She continued on prednisone taper decreasing by 5mg/week and has since been off for ~2 weeks. She was started on mesalamine  TID at that time. She continued to have loose BM with continued blood and in Mid July, pt had telehealth visit with her GI doctor and described the above symptoms. A test for C. diff was sent and was positive on July 13 2020. Mesalamine was increased to 1600mg TID and pt was given flagyl 250mg TID x 10d, which she completed, stating that she had no further bloody BM afterwards but continued to have the same consistency and frequency of BM which is now up to >10BM/day.  2d prior to admission, pt experienced further progression of her symptoms with a fever to 101. She called her GI office at that time and was advised to present to the ED. She currently has some abdominal cramping across her mid abdomen that she states she rarely gets but has been coming and going. Denies lightheadedness, dizziness, cp, sob. Does report fatigue.     In ED, VS: Tmax 101.8, 92, 130/69, 22, 96% RA. Stool positive for Cdiff. Given 1.5L IVF, oral vanco, IV flagyl x1.       Allergies:  sulfa drugs (Unknown)      Hospital Medications:  acetaminophen   Tablet .. 650 milliGRAM(s) Oral every 6 hours PRN  enoxaparin Injectable 40 milliGRAM(s) SubCutaneous daily  metroNIDAZOLE  IVPB      metroNIDAZOLE  IVPB 500 milliGRAM(s) IV Intermittent every 8 hours  sotalol 40 milliGRAM(s) Oral every 12 hours  vancomycin    Solution 125 milliGRAM(s) Oral every 6 hours      PMHX/PSHX:  Colitis  Atrial fibrillation with rapid ventricular response  Hypothyroid  SVT (supraventricular tachycardia)  Lipoma of neck  Atrial Tachycardia  Hypercholesteremia  Hypertension, Essential  Elective surgery  S/P tonsillectomy  S/P D&C (status post dilation and curettage)  H/O: Hysterectomy      Family history:  Family history of Parkinson's disease (Sibling)  Family history of CVA  Family history of renal failure      ROS:     General:  +fevers, chills, fatigue  Eyes:  No vision changes  ENT:  No sore throat, pain, runny nose  CV:  No chest pain, palpitations, dizziness   Resp:  No SOB, cough, wheezing  GI:  See HPI  :  No burning with urination, hematuria  Muscle:  No pain, weakness  Neuro:  No weakness/tingling, memory problems  Psych:  No fatigue, insomnia, mood problems, depression  Heme:  No easy bruisability  Skin:  No rash, edema      PHYSICAL EXAM:     GENERAL:  Well developed, no distress  HEENT:  NC/AT,  conjunctivae clear, sclera anicteric  CHEST:  Full & symmetric excursion, no increased effort w/ respirations  HEART:  Regular rhythm & rate  ABDOMEN:  Soft, tender to palpation on left side of abdomen, non-distended  EXTREMITIES:  no LE  edema  SKIN:  No rash/erythema/ecchymoses/petechiae/wounds/jaundice  NEURO:  Alert, oriented    Vital Signs:  Vital Signs Last 24 Hrs  T(C): 37.9 (03 Aug 2020 05:21), Max: 38.8 (02 Aug 2020 15:34)  T(F): 100.3 (03 Aug 2020 05:21), Max: 101.8 (02 Aug 2020 15:34)  HR: 82 (03 Aug 2020 05:21) (82 - 92)  BP: 106/62 (03 Aug 2020 05:21) (106/62 - 130/69)  BP(mean): --  RR: 18 (03 Aug 2020 05:21) (18 - 22)  SpO2: 96% (03 Aug 2020 05:21) (96% - 98%)  Daily Height in cm: 152.4 (02 Aug 2020 15:34)    Daily     LABS:                        9.7    36.35 )-----------( 317      ( 02 Aug 2020 19:26 )             31.8     08-02    140  |  103  |  16  ----------------------------<  114<H>  3.4<L>   |  21<L>  |  0.80    Ca    8.7      02 Aug 2020 19:26    TPro  6.5  /  Alb  3.8  /  TBili  0.6  /  DBili  x   /  AST  24  /  ALT  11  /  AlkPhos  45  08-02    LIVER FUNCTIONS - ( 02 Aug 2020 19:26 )  Alb: 3.8 g/dL / Pro: 6.5 g/dL / ALK PHOS: 45 U/L / ALT: 11 U/L / AST: 24 U/L / GGT: x           PT/INR - ( 02 Aug 2020 19:26 )   PT: 13.5 sec;   INR: 1.14 ratio         PTT - ( 02 Aug 2020 19:26 )  PTT:27.8 sec        Imaging:  < from: CT Abdomen and Pelvis w/ IV Cont (08.02.20 @ 21:28) >  FINDINGS:  LOWER CHEST: Bibasilar linear atelectasis.    LIVER: Right hepatic subcentimeter hypodense lesion too small to characterize.  BILE DUCTS: Normal caliber.  GALLBLADDER: Within normal limits.  SPLEEN: Within normal limits.  PANCREAS: Within normal limits.  ADRENALS: Within normal limits.  KIDNEYS/URETERS: Left renal cyst. Bilateral renal subcentimeter hypodense lesions which are too small for accurate characterization. 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    BLADDER: Within normal limits.  REPRODUCTIVE ORGANS: Hysterectomy. No adnexal masses.    BOWEL: Extensive mural thickening extending from the cecum to the rectum with associated inflammatory change. Colonic diverticulosis. No bowel obstruction. Appendix not visualized. No secondary signs of appendicitis.  PERITONEUM: No ascites. Small mesenteric lymph nodes, predominantly in the left lower quadrant. No drainable fluid collection.  VESSELS: Within normal limits.  RETROPERITONEUM/LYMPH NODES: No lymphadenopathy.  ABDOMINAL WALL: Within normal limits.  BONES: Total left hip arthroplasty. L5-S1 degenerative changes.    IMPRESSION:  Pancolitis which could be secondary to the patient's known diagnosis of ulcerative colitis or due to an infectious etiology.    < end of copied text >

## 2020-08-03 NOTE — PATIENT PROFILE ADULT - FLU SEASON?
Problem: NORMAL   Goal: Experiences normal transition  Description  INTERVENTIONS:  - Assess and monitor vital signs and lab values.   - Encourage skin-to-skin with caregiver for thermoregulation  - Assess signs, symptoms and risk factors for hypog
Problem: NORMAL   Goal: Experiences normal transition  Description  INTERVENTIONS:  - Assess and monitor vital signs and lab values.   - Encourage skin-to-skin with caregiver for thermoregulation  - Assess signs, symptoms and risk factors for hypog
No

## 2020-08-03 NOTE — DISCHARGE NOTE PROVIDER - PROVIDER TOKENS
PROVIDER:[[id number]],FOLLOWUP:[1 week],ESTABLISHEDPATIENT:[T]],PROVIDER:[[id number]],FOLLOWUP:[1 week],ESTABLISHEDPATIENT:[T]],PROVIDER:[[id number]],FOLLOWUP:[Routine],ESTABLISHEDPATIENT:[T]] PROVIDER:[TOKEN:[[id number]],FOLLOWUP:[1 week],ESTABLISHEDPATIENT:[T]],PROVIDER:[[id number]],FOLLOWUP:[1 week],ESTABLISHEDPATIENT:[T]],PROVIDER:[[id number]],FOLLOWUP:[Routine],ESTABLISHEDPATIENT:[T]],PROVIDER:[TOKEN:[[id number]],FOLLOWUP:[1 week]] PROVIDER:[TOKEN:[[id number]],FOLLOWUP:[1 week],ESTABLISHEDPATIENT:[T]],PROVIDER:[[id number]],FOLLOWUP:[1 week],ESTABLISHEDPATIENT:[T]],PROVIDER:[TOKEN:[[id number]],FOLLOWUP:[Routine],ESTABLISHEDPATIENT:[T]],PROVIDER:[[id number]],FOLLOWUP:[1 week]],PROVIDER:[[id number]],FOLLOWUP:[1 week]]

## 2020-08-03 NOTE — PROGRESS NOTE ADULT - ATTENDING COMMENTS
Patient with severe CDiff with WBC of 30K with Cr= 0.73  will cont Oral Vancomycin and f/up GI recommendation.         [facility]

## 2020-08-03 NOTE — H&P ADULT - PROBLEM SELECTOR PLAN 2
-S/p IVF in ED  -Treatment of C. diff as above -S/p IVF in ED  -Treatment of C. diff as above  -F/u Bcx -S/p IVF in ED  -Treatment of C. diff as above  -F/u Bcx  - hold antihypertensives

## 2020-08-03 NOTE — DISCHARGE NOTE PROVIDER - HOSPITAL COURSE
86F PMH ulcerative colitis, external hemorrhoids, SVT on sotolol, hypothyroid, HTN, HLD, admitted 5/15 - 5/20/20 for UC flare, now presenting with diarrhea x 2.5 months. Patient was admitted to [facility] in May 2020. She presented with 9/10 abdominal pain, n/v, and bloody diarrhea at that time. She was treated with IV steroid (no abx noted in chart) then started on prednisone 40mg with slow weekly taper. Over the next 2 months, she had 3-5 loose, brown, sometimes bloody, stools per day, increasing in frequency over time. Her GI doctor started her on mesalamine oral/enema but but patient experienced increased frequency of bloody BM and did not continue to use it. During this time, she had no fever, abdominal pain, n/v, or bloating, but she did become increasingly fatigued and her exercise capacity was limited due to fatigue. In Mid July, pt had telehealth visit with her GI doctor and described the above symptoms. A test for C. diff was sent and was positive on July 13 2020. Pt was given flagyl 250mg TID x 10d, which she completed, stating that he had no further bloody BM afterwards but continued to have the same consistence of BM with a frequency that had not decreased due to the antibiotic. 2d prior to admission, pt experienced further progression of her symptoms with a fever to 101. She called her GI office at that time and was advised to present to the ED.     -In hospital patient initially febrile 101.3, WBC 36, CT abd/pelvis showing pancolitis. C diff PCR +. started initally on IV flagyl + PO vancomycin, but IV flagyl held after one day. Patient status improved during hospital course. Patient should complete her course of PO vancomycin for a total of a 10 day course and follow up closely with her PCP and GI. 86F PMH ulcerative colitis, external hemorrhoids, SVT on sotolol, hypothyroid, HTN, HLD, admitted 5/15 - 5/20/20 for UC flare, now presenting with diarrhea x 2.5 months. Patient was admitted to [facility] in May 2020. She presented with 9/10 abdominal pain, n/v, and bloody diarrhea at that time. She was treated with IV steroid (no abx noted in chart) then started on prednisone 40mg with slow weekly taper. Over the next 2 months, she had 3-5 loose, brown, sometimes bloody, stools per day, increasing in frequency over time. Her GI doctor started her on mesalamine oral/enema but but patient experienced increased frequency of bloody BM and did not continue to use it. During this time, she had no fever, abdominal pain, n/v, or bloating, but she did become increasingly fatigued and her exercise capacity was limited due to fatigue. In Mid July, pt had telehealth visit with her GI doctor and described the above symptoms. A test for C. diff was sent and was positive on July 13 2020. Pt was given flagyl 250mg TID x 10d, which she completed, stating that he had no further bloody BM afterwards but continued to have the same consistence of BM with a frequency that had not decreased due to the antibiotic. 2d prior to admission, pt experienced further progression of her symptoms with a fever to 101. She called her GI office at that time and was advised to present to the ED.     -In hospital patient initially febrile 101.3, WBC 36, CT abd/pelvis showing pancolitis. C diff PCR +. started initally on IV flagyl 500mg TID + PO vancomycin 124mg q6h, but IV flagyl held after one day. Patient status improved during hospital course. Patient should complete her course of PO vancomycin for a total of a 10 day course ending 08/12 and follow up closely with her PCP and GI. 86F PMH ulcerative colitis, external hemorrhoids, SVT on sotolol, hypothyroid, HTN, HLD, admitted 5/15 - 5/20/20 for UC flare, now presenting with diarrhea x 2.5 months. Patient was admitted to [facility] in May 2020. She presented with 9/10 abdominal pain, n/v, and bloody diarrhea at that time. She was treated with IV steroid (no abx noted in chart) then started on prednisone 40mg with slow weekly taper. Over the next 2 months, she had 3-5 loose, brown, sometimes bloody, stools per day, increasing in frequency over time. Her GI doctor started her on mesalamine oral/enema but but patient experienced increased frequency of bloody BM and did not continue to use it. During this time, she had no fever, abdominal pain, n/v, or bloating, but she did become increasingly fatigued and her exercise capacity was limited due to fatigue. In Mid July, pt had telehealth visit with her GI doctor and described the above symptoms. A test for C. diff was sent and was positive on July 13 2020. Pt was given flagyl 250mg TID x 10d, which she completed, stating that he had no further bloody BM afterwards but continued to have the same consistence of BM with a frequency that had not decreased due to the antibiotic. 2d prior to admission, pt experienced further progression of her symptoms with a fever to 101. She called her GI office at that time and was advised to present to the ED.     -In hospital patient initially febrile 101.3, WBC 36, CT abd/pelvis showing pancolitis. C diff PCR +. started on IV flagyl 500mg TID + PO vancomycin 124mg q6h. Patient status improved during hospital course. Patient should complete her course of PO vancomycin for a total of a 10 day course ending 08/12 and follow up closely with her PCP and GI. 86F PMH ulcerative colitis, external hemorrhoids, SVT on sotolol, hypothyroid, HTN, HLD, admitted 5/15 - 5/20/20 for UC flare, now presenting with diarrhea x 2.5 months. Patient was admitted to [facility] in May 2020. She presented with 9/10 abdominal pain, n/v, and bloody diarrhea at that time. She was treated with IV steroid (no abx noted in chart) then started on prednisone 40mg with slow weekly taper. Over the next 2 months, she had 3-5 loose, brown, sometimes bloody, stools per day, increasing in frequency over time. Her GI doctor started her on mesalamine oral/enema but but patient experienced increased frequency of bloody BM and did not continue to use it. During this time, she had no fever, abdominal pain, n/v, or bloating, but she did become increasingly fatigued and her exercise capacity was limited due to fatigue. In Mid July, pt had telehealth visit with her GI doctor and described the above symptoms. A test for C. diff was sent and was positive on July 13 2020. Pt was given flagyl 250mg TID x 10d, which she completed, stating that he had no further bloody BM afterwards but continued to have the same consistence of BM with a frequency that had not decreased due to the antibiotic. 2d prior to admission, pt experienced further progression of her symptoms with a fever to 101. She called her GI office at that time and was advised to present to the ED.     -In hospital patient initially febrile 101.3, WBC 36, CT abd/pelvis showing pancolitis. C diff PCR +. started on IV flagyl 500mg TID + PO vancomycin 124mg q6h. Patient status improved during hospital course, with WBC normalizing, lactate normalizing, diarrhea improving, no blood in stool. Patient should complete her course of PO vancomycin and IV flagyl for a total of a 14 day course ending 08/15 and follow up closely with her PCP and GI.     -Patient had flexible sigmoidoscopy 8/6 to evaluate extensiveness of Ulcerative colitis.     -Losartan held during admission due to sepsis. Patient should restart this medication on discharge and follow up with her cardiologist at her scheduled followup appointment. 86F PMH ulcerative colitis, external hemorrhoids, SVT on sotolol, hypothyroid, HTN, HLD, admitted 5/15 - 5/20/20 for UC flare, now presenting with diarrhea x 2.5 months. Patient was admitted to [facility] in May 2020. She presented with 9/10 abdominal pain, n/v, and bloody diarrhea at that time. She was treated with IV steroid (no abx noted in chart) then started on prednisone 40mg with slow weekly taper. Over the next 2 months, she had 3-5 loose, brown, sometimes bloody, stools per day, increasing in frequency over time. Her GI doctor started her on mesalamine oral/enema but but patient experienced increased frequency of bloody BM and did not continue to use it. During this time, she had no fever, abdominal pain, n/v, or bloating, but she did become increasingly fatigued and her exercise capacity was limited due to fatigue. In Mid July, pt had telehealth visit with her GI doctor and described the above symptoms. A test for C. diff was sent and was positive on July 13 2020. Pt was given flagyl 250mg TID x 10d, which she completed, stating that he had no further bloody BM afterwards but continued to have the same consistence of BM with a frequency that had not decreased due to the antibiotic. 2d prior to admission, pt experienced further progression of her symptoms with a fever to 101. She called her GI office at that time and was advised to present to the ED.     -In hospital patient initially febrile 101.3, WBC 36, CT abd/pelvis showing pancolitis. C diff PCR +. started on IV flagyl 500mg TID + PO vancomycin 124mg q6h. Patient status improved during hospital course, with WBC normalizing, lactate normalizing, diarrhea improving, no blood in stool. Patient should complete her course of PO vancomycin and IV flagyl for a total of a 14 day course ending 08/15 and follow up closely with her PCP and GI.     -Patient had flexible sigmoidoscopy 8/6 to evaluate extensiveness of Ulcerative colitis. Results show pseudomembranes as well as inflammation c/w UC, although irregular in appearance. 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     -Losartan held during admission due to sepsis. Patient should restart this medication on discharge and follow up with her cardiologist at her scheduled followup appointment. 86F PMH ulcerative colitis, external hemorrhoids, SVT on sotolol, hypothyroid, HTN, HLD, admitted 5/15 - 5/20/20 for UC flare, now presenting with diarrhea x 2.5 months. Patient was admitted to [facility] in May 2020. She presented with 9/10 abdominal pain, n/v, and bloody diarrhea at that time. She was treated with IV steroid (no abx noted in chart) then started on prednisone 40mg with slow weekly taper. Over the next 2 months, she had 3-5 loose, brown, sometimes bloody, stools per day, increasing in frequency over time. Her GI doctor started her on mesalamine oral/enema but but patient experienced increased frequency of bloody BM and did not continue to use it. During this time, she had no fever, abdominal pain, n/v, or bloating, but she did become increasingly fatigued and her exercise capacity was limited due to fatigue. In Mid July, pt had telehealth visit with her GI doctor and described the above symptoms. A test for C. diff was sent and was positive on July 13 2020. Pt was given flagyl 250mg TID x 10d, which she completed, stating that he had no further bloody BM afterwards but continued to have the same consistence of BM with a frequency that had not decreased due to the antibiotic. 2d prior to admission, pt experienced further progression of her symptoms with a fever to 101. She called her GI office at that time and was advised to present to the ED.     -In hospital patient initially febrile 101.3, WBC 36, CT abd/pelvis showing pancolitis. C diff PCR +. started on IV flagyl 500mg TID + PO vancomycin 124mg q6h. Patient status improved during hospital course, with WBC normalizing, lactate normalizing, diarrhea improving, no blood in stool. Patient should complete her course of PO vancomycin and IV flagyl for a total of a 14 day course ending 08/15 and follow up closely with her PCP and GI.     -Patient had flexible sigmoidoscopy 8/6 to evaluate extensiveness of Ulcerative colitis. Results show pseudomembranes as well as inflammation c/w UC, although irregular in appearance. 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 Evaluated by colorectal surgery. Patient will follow up with colorectal surgeon Salvador Baron to discuss potential colorectal surgery options subsequent to discharge.     -Losartan held during admission due to sepsis. Patient should restart this medication on discharge and follow up with her cardiologist at her scheduled followup appointment. 86F PMH ulcerative colitis, external hemorrhoids, SVT on sotolol, hypothyroid, HTN, HLD, admitted 5/15 - 5/20/20 for UC flare, wh presenting with diarrhea x 2.5 months. Patient was admitted to [facility] in May 2020. She presented with 9/10 abdominal pain, n/v, and bloody diarrhea at that time. She was treated with IV steroid (no abx noted in chart) then started on prednisone 40mg with slow weekly taper. Over the next 2 months, she had 3-5 loose, brown, sometimes bloody, stools per day, increasing in frequency over time. Her GI doctor started her on mesalamine oral/enema but but patient experienced increased frequency of bloody BM and did not continue to use it. During this time, she had no fever, abdominal pain, n/v, or bloating, but she did become increasingly fatigued and her exercise capacity was limited due to fatigue. In Mid July, pt had telehealth visit with her GI doctor and described the above symptoms. A test for C. diff was sent and was positive on July 13 2020. Pt was given flagyl 250mg TID x 10d, which she completed, stating that he had no further bloody BM afterwards but continued to have the same consistence of BM with a frequency that had not decreased due to the antibiotic. 2d prior to admission, pt experienced further progression of her symptoms with a fever to 101. She called her GI office at that time and was advised to present to the ED.     -In hospital patient initially febrile 101.3, WBC 36, CT abd/pelvis showing pancolitis. C diff PCR +. started on IV flagyl 500mg TID + PO vancomycin 124mg q6h. Patient status improved during hospital course, with WBC normalizing, lactate normalizing, diarrhea improving, no blood in stool. Patient should complete her course of PO vancomycin and IV flagyl for a total of a 14 day course ending 08/15 and follow up closely with her PCP and GI.     -Patient had flexible sigmoidoscopy 8/6 to evaluate extensiveness of Ulcerative colitis. Results show pseudomembranes as well as inflammation c/w UC, although irregular in appearance. 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 Evaluated by colorectal surgery. Patient will follow up with colorectal surgeon Salvador Baron to discuss potential colorectal surgery options subsequent to discharge.     -Losartan held during admission due to sepsis. Patient should restart this medication on discharge and follow up with her cardiologist at her scheduled followup appointment. 86F PMH ulcerative colitis, external hemorrhoids, SVT on sotolol, hypothyroid, HTN, HLD, admitted 5/15 - 5/20/20 for UC flare, who presented with diarrhea x 2.5 months. Patient was admitted to [facility] in May 2020 with abdominal pain nand bloody diarrhea and was treated with prednisone 40mg with slow weekly taper. She subsequently  Over the next 2 months, she had 3-5 loose, brown, sometimes bloody, stools per day, increasing in frequency over time. Her GI doctor started her on mesalamine oral/enema but but patient experienced increased frequency of bloody BM and did not continue to use it. During this time, she had no fever, abdominal pain, n/v, or bloating, but she did become increasingly fatigued and her exercise capacity was limited due to fatigue. In Mid July, pt had telehealth visit with her GI doctor and described the above symptoms. A test for C. diff was sent and was positive on July 13 2020. Pt was given flagyl 250mg TID x 10d, which she completed, stating that he had no further bloody BM afterwards but continued to have the same consistence of BM with a frequency that had not decreased due to the antibiotic. 2d prior to admission, pt experienced further progression of her symptoms with a fever to 101. She called her GI office at that time and was advised to present to the ED.     -In hospital patient initially febrile 101.3, WBC 36, CT abd/pelvis showing pancolitis. C diff PCR +. started on IV flagyl 500mg TID + PO vancomycin 124mg q6h. Patient status improved during hospital course, with WBC normalizing, lactate normalizing, diarrhea improving, no blood in stool. Patient should complete her course of PO vancomycin and IV flagyl for a total of a 14 day course ending 08/15 and follow up closely with her PCP and GI.     -Patient had flexible sigmoidoscopy 8/6 to evaluate extensiveness of Ulcerative colitis. Results show pseudomembranes as well as inflammation c/w UC, although irregular in appearance. 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 Evaluated by colorectal surgery. Patient will follow up with colorectal surgeon Salvador Baron to discuss potential colorectal surgery options subsequent to discharge.     -Losartan held during admission due to sepsis. Patient should restart this medication on discharge and follow up with her cardiologist at her scheduled followup appointment. 86F PMH ulcerative colitis, external hemorrhoids, SVT on sotolol, hypothyroid, HTN, HLD, admitted 5/15 - 5/20/20 for UC flare, who presented with diarrhea x 2.5 months. Patient was admitted to [facility] in May 2020 with abdominal pain nand bloody diarrhea and was treated with prednisone 40mg with slow weekly taper. She subsequently tested positive for C. diff on July 13 2020 and was treated with flagyl 250mg TID x 10d, but continued to diarrhea. She then developed a fever and was told to go to the ED.        In hospital patient initially febrile 101.3, WBC 36, CT abd/pelvis showing pancolitis. C diff PCR +.  She was started on IV flagyl 500mg TID + PO vancomycin 125mg q6h per ID.  Patient status improved during hospital course, with WBC normalizing, lactate normalizing, diarrhea improving, no blood in stool. Pt completed 10 days of flagyl and will now be discharged to complete a vancomycin taper of 125mg TID for a week, BID for a week, daily for a week, q48h for a week and then q72h for two weeks. Pt will follow up with infectious disease after discharge.     Of note, Patient had flexible sigmoidoscopy 8/6 to evaluate extensiveness of Ulcerative colitis and had a Biopsy which showed sigmoidal adenocarcinoma. A sample was also sent to her son who is a pathologist in [place] for a second opinion. She was Evaluated by colorectal surgery and will follow up with colorectal surgeon Salvador Baron to discuss potential options colorectal surgery options after discharge.     Also. of note, Losartan held during admission due to sepsis. Patient should will follow up with  primary doctor for further management. edilma 86F PMH ulcerative colitis, external hemorrhoids, SVT on sotolol, hypothyroid, HTN, HLD, admitted 5/15 - 5/20/20 for UC flare, who presented with diarrhea x 2.5 months. Patient had been admitted to [facility] in May 2020 with abdominal pain and bloody diarrhea and was treated with Prednisone 40mg with slow weekly taper. She subsequently tested positive for C. diff on July 13 2020 and was treated with flagyl 250mg TID x 10d as outpatient, but continued to have diarrhea. As she then developed a fever and was told to present to the ED.        At the hospital patient was initially febrile 101.3, with WBC 36, and CT abd/pelvis demonstrated pancolitis. C diff PCR +.  She was started on IV flagyl 500mg TID + PO vancomycin 125mg q6h per ID.  Patien's clinincal status improved during hospital course, with WBC normalizing, lactate normalizing, diarrhea improving, no blood in stool. Pt completed 10 days of flagyl and will now be discharged to complete a vancomycin taper of 125mg TID for a week, BID for a week, daily for a week, q48h for a week and then q72h for two weeks, again as per ID's recommendations, with whom patient will follow up after discharge.         Of note, patient underwent a flexible sigmoidoscopy on 8/6 per GI to evaluate extent of ulcerative colitis with biopsy taken revealing per pathology, sigmoidal adenocarcinoma. Per patient's request, slides were sent to her son is a pathologist in [place] (at [facility]) for a second opinion with his colleague who specializes in GI pathology. She was evaluated by colorectal surgery during her hospitalization and has been instructed to follow up with colorectal surgeon, Dr. Baron on discharge to discuss potential options colorectal surgery options after discharge. She did not want to pursue discussions with oncology for management options at this time, preferring to wait for second opinion.         Finally, patient's home Losartan was held during this admission due to originally presenting sepsis. Patient should will follow up with her PMD, Dr. Quinonez who is aware of current hospitalization and new adenocarcinoma diagnosis, for further management and care coordination. Given ongoing diarrhea, and intermittent bouts of hypokalemia, the patient will be sent on KCL 20mEq x 1week until she can follow up with her PMD for repeat labwork.

## 2020-08-04 NOTE — CONSULT NOTE ADULT - ASSESSMENT
85 f with HTN, HLD, SVT on sotalol, external hemorrhoids, UC was in remission until a year ago and was started on budesonide and tapered, then was recently admitted for UC flare and bloody diarrhea, was given steroids and again at home had bloody diarrhea, C-diff was checked 7/15 and was positive, she was given flagyl and the bloody diarrhea resolved but she still had diarrhea, now p/w fever and worsening diarrhea  here febrile to 101.8, WBC: 36  abd/pelvis CT with pan colitis  C-diff positive    fever, leukocytosis, sepsis  severe C-diff in the setting of ulcerative colitis, first recurrence or not fully treated first episode    * c/w PO vanco 125 q 6  * c/w IV flagyl 500 q 8  * monitor the WBC and temp curve  * monitor the stool count  * f/u with GI    The above assessment and plan was discussed with the medicine resident    Ana Barrow MD  Pager [phone]  After 5pm and on weekends call [phone]

## 2020-08-04 NOTE — PROGRESS NOTE ADULT - ATTENDING COMMENTS
C diff  Ulcerative colitis  NO significant improvement with vanco and flagyl  Suspect UC driving complaints  Rec ID eval (?candidate for fecal transplant)  Likely flex sig thursday if no significant improvement  Low residue diet

## 2020-08-04 NOTE — CONSULT NOTE ADULT - SUBJECTIVE AND OBJECTIVE BOX
HPI:  86F PMH ulcerative colitis, external hemorrhoids, SVT on sotolol, hypothyroid, HTN, HLD, admitted 5/15 - 5/20/20 for UC flare, now presenting with diarrhea x 2.5 months. Patient was admitted to [facility] in May 2020. She presented with 9/10 abdominal pain, n/v, and bloody diarrhea at that time. She was treated with IV steroid (no abx noted in chart) then started on prednisone 40mg with slow weekly taper. Over the next 2 months, she had 3-5 loose, brown, sometimes bloody, stools per day, increasing in frequency over time. Her GI doctor started her on mesalamine oral/enema but but patient experienced increased frequency of bloody BM and did not continue to use it. During this time, she had no fever, abdominal pain, n/v, or bloating, but she did become increasingly fatigued and her exercise capacity was limited due to fatigue. In Mid July, pt had telehealth visit with her GI doctor and described the above symptoms. A test for C. diff was sent and was positive on July 13 2020. Pt was given flagyl 250mg TID x 10d, which she completed, stating that he had no further bloody BM afterwards but continued to have the same consistence of BM with a frequency that had not decreased due to the antibiotic. 2d prior to admission, pt experienced further progression of her symptoms with a fever to 101. She called her GI office at that time and was advised to present to the ED.     In ED, VS: Tmax 101.8, 92, 130/69, 22, 96% RA. Stool positive for Cdiff. Given 1.5L IVF, oral vanco, IV flagyl x1. (03 Aug 2020 01:55)      PAST MEDICAL & SURGICAL HISTORY:  Colitis  Hypothyroid  Lipoma of neck  Atrial Tachycardia: SVT on Sotalol  Hypercholesteremia  Hypertension, Essential  Elective surgery: Pilonidal cystectomy  S/P tonsillectomy  S/P D&C (status post dilation and curettage)  H/O: Hysterectomy      Allergies    sulfa drugs (Unknown)    Intolerances        ANTIMICROBIALS:  metroNIDAZOLE  IVPB 500 every 8 hours  vancomycin    Solution 125 every 6 hours      OTHER MEDS:  acetaminophen   Tablet .. 650 milliGRAM(s) Oral every 6 hours PRN  atorvastatin 20 milliGRAM(s) Oral at bedtime  enoxaparin Injectable 40 milliGRAM(s) SubCutaneous daily  levothyroxine 50 MICROGram(s) Oral daily  sotalol 40 milliGRAM(s) Oral every 12 hours      SOCIAL HISTORY:  , lives with , no pets at home   no smoking, alcohol or drug abuse  no recent travel    FAMILY HISTORY:  Family history of Parkinson's disease (Sibling): HTN, DM  Family history of CVA: DM  Family history of renal failure: HTN  UC in mother and cousins     ROS:    All other systems negative     Constitutional: + fever, no chills, no weight loss, no night sweats  Eye: no eye pain, no redness, no vision changes  ENT:  no sore throat, no rhinorrhea  Cardiovascular:  no chest pain, no palpitation  Respiratory:  no SOB, no cough  GI:  no abd pain, no vomiting, + watery diarrhea  urinary: no dysuria, no hematuria, no flank pain  : no vaginal discharge or bleeding  musculoskeletal:  no joint pain, no joint swelling  skin:  no rash  neurology:  no headache, no seizure, no change in mental status  psych: no anxiety, no depression     Physical Exam:    General:    NAD, non toxic  Head: atraumatic, normocephalic  Eyes: normal sclera and conjunctiva  ENT:   no oropharyngeal lesions, no LAD, neck supple  Cardio:    regular S1,S2, no murmur  Respiratory:   clear b/l, no wheezing  abd:   soft, BS +, not tender  :     no CVAT, no suprapubic tenderness, no walter  Musculoskeletal : no joint swelling, no edema  Skin:    no rash  vascular: no phlebitis  Neurologic:     no focal deficits  psych: normal affect      Drug Dosing Weight  Height (cm): 152.4 (02 Aug 2020 15:34)  Weight (kg): 50.8 (02 Aug 2020 15:34)  BMI (kg/m2): 21.9 (02 Aug 2020 15:34)  BSA (m2): 1.46 (02 Aug 2020 15:34)    Vital Signs Last 24 Hrs  T(F): 98.4 (08-04-20 @ 04:39), Max: 101.8 (08-02-20 @ 15:34)    Vital Signs Last 24 Hrs  HR: 76 (08-04-20 @ 04:39) (76 - 97)  BP: 104/61 (08-04-20 @ 04:39) (104/61 - 123/78)  RR: 18 (08-04-20 @ 04:39)  SpO2: 95% (08-04-20 @ 04:39) (95% - 97%)  Wt(kg): --                          9.1    21.80 )-----------( 276      ( 04 Aug 2020 10:23 )             29.3       08-04    140  |  107  |  8   ----------------------------<  94  3.4<L>   |  20<L>  |  0.71    Ca    7.7<L>      04 Aug 2020 10:23  Phos  2.2     08-04  Mg     1.8     08-04    TPro  5.4<L>  /  Alb  3.0<L>  /  TBili  0.4  /  DBili  x   /  AST  19  /  ALT  9<L>  /  AlkPhos  54  08-04          MICROBIOLOGY:  v  .Stool Feces  08-03-20   GI PCR Results: NOT detected      .Blood Blood-Peripheral  08-02-20   No growth to date.  --  --            Clostridium difficile GDH Toxins A&amp;B, EIA:   Positive (08-02-20 @ 20:13)  Clostridium difficile GDH Interpretation: Positive for toxigenic C. Difficile.  This specimen is positive for C.  Difficile glutamate dehydrogenase (GDH) antigen and positive for C.  Difficile Toxins A & B, by EIA.  GDH is a highly sensitive marker for C.  Difficile that is produced in largeamounts by all C. Difficile strains,  both toxigenic and nontoxigenic.  This assay has not been validated as a  test of cure.  The results of this assay should always be interpreted in  conjunction with patient's clinical history. (08-02-20 @ 20:13)        RADIOLOGY:    Images below independently visualized and reviewed personally,  findings as below  < from: CT Abdomen and Pelvis w/ IV Cont (08.02.20 @ 21:28) >  IMPRESSION:  Pancolitis which could be secondary to the patient's known diagnosis of ulcerative colitis or due to an infectious etiology.

## 2020-08-04 NOTE — PROGRESS NOTE ADULT - PROBLEM SELECTOR PLAN 2
-S/p IVF in ED  -Treatment of C. diff as above  -F/u Bcx, Ucx, GI PCR/Cultures.   - hold antihypertensives -S/p IVF in ED  -Treatment of C. diff as above  -Bcx, GIcx negative  -F/U Ucx.  - hold antihypertensives -S/p IVF in ED  -Treatment of C. diff as above  -Bcx, GIcx negative  - hold antihypertensives

## 2020-08-04 NOTE — PROGRESS NOTE ADULT - PROBLEM SELECTOR PLAN 1
Unclear if this represents treatment failure or recurrence, given symptoms did not resolve fully after initial course of flagyl.  -Started on vanco PO 125mg q6h and flagyl IV 500mg q8h  -Given patient was not properly treated during first course (vancomycin or fidaxomycin are first line) and non-complicated C. Diff (no hypotension, normal lactate, ect), Will D/C IV flagyl, continue patient on PO vancomycin.   -CT abd/pelvis showing pancolitis without lymphadenopathy.   -WBC improving 36->30  -Trend BMP, replete lytes PRN. Will f/u CBC, lactate, monitor for toxic signs.   -Started on clear liquid diet today to give patient's bowels some rest given pancolitis, although patient tolerating full diet at home. Will advance as tolerated.   -No concern for complications including toxic megacolon given no distension or peritoneal signs on exam, lactate wnl  -GI consulted, will appreciate recs. Unclear if this represents treatment failure or recurrence, given symptoms did not resolve fully after initial course of flagyl.  -Started on vanco PO 125mg q6h and flagyl IV 500mg q8h  -Given complicated C.Diff (failure of treatment, high white count, pancolitis, WBC >30 ect), will continue patient on PO vancomycin plus IV flagyl.   -CT abd/pelvis showing pancolitis without lymphadenopathy.   -WBC improving 36->30  -Trend BMP, replete lytes PRN. Will f/u CBC, lactate, monitor for toxic signs.   -Started on clear liquid diet to give patient's bowels some rest given pancolitis, although patient tolerating full diet at home. Will advance as tolerated.   -No concern for complications including toxic megacolon given no distension or peritoneal signs on exam, lactate wnl  -GI following, will appreciate recs. Unclear if this represents treatment failure or recurrence, given symptoms did not resolve fully after initial course of flagyl.  -Started on vanco PO 125mg q6h and flagyl IV 500mg q8h  -Given complicated C.Diff (failure of treatment, high white count, pancolitis, WBC >30 ect), will continue patient on PO vancomycin plus IV flagyl.   -CT abd/pelvis showing pancolitis without lymphadenopathy.   -WBC improving 36->30->21.8  -Trend BMP, replete lytes PRN. Will f/u CBC, lactate, monitor for toxic signs.   -Started on clear liquid diet to give patient's bowels some rest given pancolitis, although patient tolerating full diet at home. Will advance as tolerated.   -No concern for complications including toxic megacolon given no distension or peritoneal signs on exam, lactate wnl  -GI following, will appreciate recs.

## 2020-08-04 NOTE — PROVIDER CONTACT NOTE (CHANGE IN STATUS NOTIFICATION) - ACTION/TREATMENT ORDERED:
medication given as per order . control  temp medication given as per order . control  temp 98.4 . Will continue to monitor

## 2020-08-04 NOTE — PROGRESS NOTE ADULT - SUBJECTIVE AND OBJECTIVE BOX
PROGRESS NOTE:   Authored by Jose D Gonzales MD, PGY1 [facility] Pager [phone] [facility] pager [phone]    Patient is a 86y old  Female who presents with a chief complaint of 86F sent in by GI for persistent diarrhea (03 Aug 2020 11:02)      SUBJECTIVE / OVERNIGHT EVENTS:     REVIEW OF SYSTEMS:    CONSTITUTIONAL: No weakness, fevers or chills  EYES/ENT: No visual changes;  No vertigo or throat pain   NECK: No pain or stiffness  RESPIRATORY: No cough, wheezing, hemoptysis; No shortness of breath  CARDIOVASCULAR: No chest pain or palpitations  GASTROINTESTINAL: No abdominal or epigastric pain. No nausea, vomiting, or hematemesis; No diarrhea or constipation. No melena or hematochezia.  GENITOURINARY: No dysuria, frequency or hematuria  NEUROLOGICAL: No numbness or weakness  SKIN: No itching, rashes    MEDICATIONS  (STANDING):  atorvastatin 20 milliGRAM(s) Oral at bedtime  enoxaparin Injectable 40 milliGRAM(s) SubCutaneous daily  levothyroxine 50 MICROGram(s) Oral daily  metroNIDAZOLE  IVPB 500 milliGRAM(s) IV Intermittent every 8 hours  potassium phosphate / sodium phosphate Powder (PHOS-NaK) 4 Packet(s) Oral once  sotalol 40 milliGRAM(s) Oral every 12 hours  vancomycin    Solution 125 milliGRAM(s) Oral every 6 hours    MEDICATIONS  (PRN):  acetaminophen   Tablet .. 650 milliGRAM(s) Oral every 6 hours PRN Temp greater or equal to 38C (100.4F), Mild Pain (1 - 3)      CAPILLARY BLOOD GLUCOSE        I&O's Summary    03 Aug 2020 07:01  -  04 Aug 2020 07:00  --------------------------------------------------------  IN: 600 mL / OUT: 0 mL / NET: 600 mL        PHYSICAL EXAM:  Vital Signs Last 24 Hrs  T(C): 36.9 (04 Aug 2020 04:39), Max: 38.1 (03 Aug 2020 21:21)  T(F): 98.4 (04 Aug 2020 04:39), Max: 100.6 (03 Aug 2020 21:21)  HR: 76 (04 Aug 2020 04:39) (76 - 97)  BP: 104/61 (04 Aug 2020 04:39) (104/61 - 123/78)  BP(mean): --  RR: 18 (04 Aug 2020 04:39) (18 - 18)  SpO2: 95% (04 Aug 2020 04:39) (95% - 97%)    CONSTITUTIONAL: NAD, well-developed  RESPIRATORY: Normal respiratory effort; lungs are clear to auscultation bilaterally  CARDIOVASCULAR: Regular rate and rhythm, normal S1 and S2, no murmur/rub/gallop; No lower extremity edema; Peripheral pulses are 2+ bilaterally  ABDOMEN: Nontender to palpation, normoactive bowel sounds, no rebound/guarding; No hepatosplenomegaly  MUSCLOSKELETAL: no clubbing or cyanosis of digits; no joint swelling or tenderness to palpation  PSYCH: A+O to person, place, and time; affect appropriate  NEURO: Non-focal, no tremors  SKIN: No rashes    LABS:                        8.9    30.39 )-----------( 265      ( 03 Aug 2020 07:16 )             29.0     08-03    137  |  105  |  12  ----------------------------<  95  3.5   |  17<L>  |  0.90    Ca    7.9<L>      03 Aug 2020 16:51  Phos  1.3     08-03  Mg     1.7     08-03    TPro  5.5<L>  /  Alb  3.1<L>  /  TBili  0.4  /  DBili  x   /  AST  18  /  ALT  11  /  AlkPhos  44  08-03    PT/INR - ( 02 Aug 2020 19:26 )   PT: 13.5 sec;   INR: 1.14 ratio         PTT - ( 02 Aug 2020 19:26 )  PTT:27.8 sec          GI PCR Panel, Stool (collected 03 Aug 2020 14:33)  Source: .Stool Feces  Final Report (03 Aug 2020 18:33):    GI PCR Results: NOT detected    *******Please Note:*******    GI panel PCR evaluates for:    Campylobacter, Plesiomonas shigelloides, Salmonella,    Vibrio, Yersinia enterocolitica, Enteroaggregative    Escherichia coli (EAEC), Enteropathogenic E.coli (EPEC),    Enterotoxigenic E. coli (ETEC) lt/st, Shiga-like    toxin-producing E. coli (STEC) stx1/stx2,    Shigella/ Enteroinvasive E. coli (EIEC), Cryptosporidium,    Cyclospora cayetanensis, Entamoeba histolytica,    Giardia lamblia, Adenovirus F 40/41, Astrovirus,    Norovirus GI/GII, Rotavirus A, Sapovirus    Culture - Blood (collected 02 Aug 2020 22:08)  Source: .Blood Blood-Peripheral  Preliminary Report (03 Aug 2020 23:01):    No growth to date.    Culture - Blood (collected 02 Aug 2020 22:08)  Source: .Blood Blood-Peripheral  Preliminary Report (03 Aug 2020 23:01):    No growth to date.        RADIOLOGY & ADDITIONAL TESTS:  No new imaging or tests    COORDINATION OF CARE:  Care Discussed with Consultants/Other Providers [Y/N]:  Prior or Outpatient Records Reviewed [Y/N]: PROGRESS NOTE:   Authored by Jose D Gonzales MD, PGY1 [facility] Pager [phone] [facility] pager [phone]    Patient is a 86y old  Female who presents with a chief complaint of 86F sent in by GI for persistent diarrhea (03 Aug 2020 11:02)      SUBJECTIVE / OVERNIGHT EVENTS: Patient notes that she was able to get through most of the night without having to get up for a bowel movement, which is an improvement for her. Still gets crampy abdominal pain when she needs to go to the bathroom which improves when she passes a bowel movement. Patient denies feelings of fever, night sweats, chills, chest pain, SOB, urinary frequency or dysuria, or headaches. Notes the pain/swelling in her legs feels better being in bed with legs elevated. Notes no blood in stool other than when she wipes, which she attributes to hemorrhoids.     REVIEW OF SYSTEMS:    CONSTITUTIONAL: No weakness, fevers or chills  EYES/ENT: No visual changes;  No vertigo or throat pain   NECK: No pain or stiffness  RESPIRATORY: No cough, wheezing, hemoptysis; No shortness of breath  CARDIOVASCULAR: No chest pain or palpitations  GASTROINTESTINAL: +abdominal pain, diarrhea. No nausea, vomiting, or hematemesis; constipation. No melena or hematochezia.  GENITOURINARY: No dysuria, frequency or hematuria  NEUROLOGICAL: No numbness or weakness  SKIN: No itching, rashes    MEDICATIONS  (STANDING):  atorvastatin 20 milliGRAM(s) Oral at bedtime  enoxaparin Injectable 40 milliGRAM(s) SubCutaneous daily  levothyroxine 50 MICROGram(s) Oral daily  metroNIDAZOLE  IVPB 500 milliGRAM(s) IV Intermittent every 8 hours  potassium phosphate / sodium phosphate Powder (PHOS-NaK) 4 Packet(s) Oral once  sotalol 40 milliGRAM(s) Oral every 12 hours  vancomycin    Solution 125 milliGRAM(s) Oral every 6 hours    MEDICATIONS  (PRN):  acetaminophen   Tablet .. 650 milliGRAM(s) Oral every 6 hours PRN Temp greater or equal to 38C (100.4F), Mild Pain (1 - 3)      CAPILLARY BLOOD GLUCOSE        I&O's Summary    03 Aug 2020 07:01  -  04 Aug 2020 07:00  --------------------------------------------------------  IN: 600 mL / OUT: 0 mL / NET: 600 mL        PHYSICAL EXAM:  Vital Signs Last 24 Hrs  T(C): 36.9 (04 Aug 2020 04:39), Max: 38.1 (03 Aug 2020 21:21)  T(F): 98.4 (04 Aug 2020 04:39), Max: 100.6 (03 Aug 2020 21:21)  HR: 76 (04 Aug 2020 04:39) (76 - 97)  BP: 104/61 (04 Aug 2020 04:39) (104/61 - 123/78)  BP(mean): --  RR: 18 (04 Aug 2020 04:39) (18 - 18)  SpO2: 95% (04 Aug 2020 04:39) (95% - 97%)    CONSTITUTIONAL: NAD, well-developed  RESPIRATORY: Normal respiratory effort; lungs are clear to auscultation bilaterally  CARDIOVASCULAR: Regular rate and rhythm, normal S1 and S2, no murmur/rub/gallop; No lower extremity edema; Peripheral pulses are 2+ bilaterally  ABDOMEN: Tender to palpation in RUQ and LLQ. Soft, nondistended. Normoactive bowel sounds, no rebound/guarding; No hepatosplenomegaly  MUSCLOSKELETAL: no clubbing or cyanosis of digits; no joint swelling or tenderness to palpation  PSYCH: A+O to person, place, and time; affect appropriate  NEURO: Non-focal, no tremors  SKIN: No rashes    LABS:                        8.9    30.39 )-----------( 265      ( 03 Aug 2020 07:16 )             29.0     08-03    137  |  105  |  12  ----------------------------<  95  3.5   |  17<L>  |  0.90    Ca    7.9<L>      03 Aug 2020 16:51  Phos  1.3     08-03  Mg     1.7     08-03    TPro  5.5<L>  /  Alb  3.1<L>  /  TBili  0.4  /  DBili  x   /  AST  18  /  ALT  11  /  AlkPhos  44  08-03    PT/INR - ( 02 Aug 2020 19:26 )   PT: 13.5 sec;   INR: 1.14 ratio         PTT - ( 02 Aug 2020 19:26 )  PTT:27.8 sec          GI PCR Panel, Stool (collected 03 Aug 2020 14:33)  Source: .Stool Feces  Final Report (03 Aug 2020 18:33):    GI PCR Results: NOT detected    *******Please Note:*******    GI panel PCR evaluates for:    Campylobacter, Plesiomonas shigelloides, Salmonella,    Vibrio, Yersinia enterocolitica, Enteroaggregative    Escherichia coli (EAEC), Enteropathogenic E.coli (EPEC),    Enterotoxigenic E. coli (ETEC) lt/st, Shiga-like    toxin-producing E. coli (STEC) stx1/stx2,    Shigella/ Enteroinvasive E. coli (EIEC), Cryptosporidium,    Cyclospora cayetanensis, Entamoeba histolytica,    Giardia lamblia, Adenovirus F 40/41, Astrovirus,    Norovirus GI/GII, Rotavirus A, Sapovirus    Culture - Blood (collected 02 Aug 2020 22:08)  Source: .Blood Blood-Peripheral  Preliminary Report (03 Aug 2020 23:01):    No growth to date.    Culture - Blood (collected 02 Aug 2020 22:08)  Source: .Blood Blood-Peripheral  Preliminary Report (03 Aug 2020 23:01):    No growth to date.        RADIOLOGY & ADDITIONAL TESTS:  No new imaging or tests    COORDINATION OF CARE:  Care Discussed with Consultants/Other Providers [Y/N]:  Prior or Outpatient Records Reviewed [Y/N]: PROGRESS NOTE:   Authored by Jose D Gonzales MD, PGY1 [facility] Pager [phone] [facility] pager [phone]    Patient is a 86y old  Female who presents with a chief complaint of 86F sent in by GI for persistent diarrhea (03 Aug 2020 11:02)      SUBJECTIVE / OVERNIGHT EVENTS: Patient notes that she was able to get through most of the night without having to get up for a bowel movement, which is an improvement for her. Still gets crampy abdominal pain when she needs to go to the bathroom which improves when she passes a bowel movement. Patient denies feelings of fever, night sweats, chills, chest pain, SOB, urinary frequency or dysuria, or headaches. Notes the pain/swelling in her legs feels better being in bed with legs elevated. Notes no blood in stool other than when she wipes, which she attributes to hemorrhoids.     REVIEW OF SYSTEMS:    CONSTITUTIONAL: No weakness, fevers, no current chills  EYES/ENT: No visual changes;  No vertigo or throat pain   NECK: No pain or stiffness  RESPIRATORY: No cough, wheezing, hemoptysis; No shortness of breath  CARDIOVASCULAR: No chest pain or palpitations  GASTROINTESTINAL: +abdominal pain, diarrhea. No nausea, vomiting, or hematemesis; constipation. No melena or hematochezia.  GENITOURINARY: No dysuria, frequency or hematuria  NEUROLOGICAL: No numbness or weakness  SKIN: No itching, rashes    MEDICATIONS  (STANDING):  atorvastatin 20 milliGRAM(s) Oral at bedtime  enoxaparin Injectable 40 milliGRAM(s) SubCutaneous daily  levothyroxine 50 MICROGram(s) Oral daily  metroNIDAZOLE  IVPB 500 milliGRAM(s) IV Intermittent every 8 hours  potassium phosphate / sodium phosphate Powder (PHOS-NaK) 4 Packet(s) Oral once  sotalol 40 milliGRAM(s) Oral every 12 hours  vancomycin    Solution 125 milliGRAM(s) Oral every 6 hours    MEDICATIONS  (PRN):  acetaminophen   Tablet .. 650 milliGRAM(s) Oral every 6 hours PRN Temp greater or equal to 38C (100.4F), Mild Pain (1 - 3)      CAPILLARY BLOOD GLUCOSE        I&O's Summary    03 Aug 2020 07:01  -  04 Aug 2020 07:00  --------------------------------------------------------  IN: 600 mL / OUT: 0 mL / NET: 600 mL        PHYSICAL EXAM:  Vital Signs Last 24 Hrs  T(C): 36.9 (04 Aug 2020 04:39), Max: 38.1 (03 Aug 2020 21:21)  T(F): 98.4 (04 Aug 2020 04:39), Max: 100.6 (03 Aug 2020 21:21)  HR: 76 (04 Aug 2020 04:39) (76 - 97)  BP: 104/61 (04 Aug 2020 04:39) (104/61 - 123/78)  BP(mean): --  RR: 18 (04 Aug 2020 04:39) (18 - 18)  SpO2: 95% (04 Aug 2020 04:39) (95% - 97%)    CONSTITUTIONAL: NAD, well-developed  RESPIRATORY: Normal respiratory effort; lungs are clear to auscultation bilaterally  CARDIOVASCULAR: Regular rate and rhythm, normal S1 and S2, no murmur/rub/gallop; No lower extremity edema; Peripheral pulses are 2+ bilaterally  ABDOMEN: Tender to palpation in RUQ and LLQ. Soft, nondistended. Normoactive bowel sounds, no rebound/guarding; No hepatosplenomegaly  MUSCLOSKELETAL: no clubbing or cyanosis of digits; no joint swelling or tenderness to palpation  PSYCH: A+O to person, place, and time; affect appropriate  NEURO: Non-focal, no tremors  SKIN: No rashes    LABS:                        8.9    30.39 )-----------( 265      ( 03 Aug 2020 07:16 )             29.0     08-03    137  |  105  |  12  ----------------------------<  95  3.5   |  17<L>  |  0.90    Ca    7.9<L>      03 Aug 2020 16:51  Phos  1.3     08-03  Mg     1.7     08-03    TPro  5.5<L>  /  Alb  3.1<L>  /  TBili  0.4  /  DBili  x   /  AST  18  /  ALT  11  /  AlkPhos  44  08-03    PT/INR - ( 02 Aug 2020 19:26 )   PT: 13.5 sec;   INR: 1.14 ratio         PTT - ( 02 Aug 2020 19:26 )  PTT:27.8 sec          GI PCR Panel, Stool (collected 03 Aug 2020 14:33)  Source: .Stool Feces  Final Report (03 Aug 2020 18:33):    GI PCR Results: NOT detected    *******Please Note:*******    GI panel PCR evaluates for:    Campylobacter, Plesiomonas shigelloides, Salmonella,    Vibrio, Yersinia enterocolitica, Enteroaggregative    Escherichia coli (EAEC), Enteropathogenic E.coli (EPEC),    Enterotoxigenic E. coli (ETEC) lt/st, Shiga-like    toxin-producing E. coli (STEC) stx1/stx2,    Shigella/ Enteroinvasive E. coli (EIEC), Cryptosporidium,    Cyclospora cayetanensis, Entamoeba histolytica,    Giardia lamblia, Adenovirus F 40/41, Astrovirus,    Norovirus GI/GII, Rotavirus A, Sapovirus    Culture - Blood (collected 02 Aug 2020 22:08)  Source: .Blood Blood-Peripheral  Preliminary Report (03 Aug 2020 23:01):    No growth to date.    Culture - Blood (collected 02 Aug 2020 22:08)  Source: .Blood Blood-Peripheral  Preliminary Report (03 Aug 2020 23:01):    No growth to date.        RADIOLOGY & ADDITIONAL TESTS:  No new imaging or tests    COORDINATION OF CARE:  Care Discussed with Consultants/Other Providers [Y/N]:  Prior or Outpatient Records Reviewed [Y/N]:

## 2020-08-04 NOTE — PROGRESS NOTE ADULT - SUBJECTIVE AND OBJECTIVE BOX
Chief Complaint:  Patient is a 86y old  Female who presents with a chief complaint of 86F sent in by GI for persistent diarrhea (04 Aug 2020 08:13)      Interval Events: No acute events overnight. Patient seen this morning - states able to sleep throughout night without BM. Went twice this morning - still watery and brown, without blood. Reports abdominal cramping that improves after BM.     Allergies:  sulfa drugs (Unknown)      Hospital Medications:  acetaminophen   Tablet .. 650 milliGRAM(s) Oral every 6 hours PRN  atorvastatin 20 milliGRAM(s) Oral at bedtime  enoxaparin Injectable 40 milliGRAM(s) SubCutaneous daily  levothyroxine 50 MICROGram(s) Oral daily  metroNIDAZOLE  IVPB 500 milliGRAM(s) IV Intermittent every 8 hours  potassium phosphate / sodium phosphate Powder (PHOS-NaK) 4 Packet(s) Oral once  sotalol 40 milliGRAM(s) Oral every 12 hours  vancomycin    Solution 125 milliGRAM(s) Oral every 6 hours      PMHX/PSHX:  Colitis  Atrial fibrillation with rapid ventricular response  Hypothyroid  SVT (supraventricular tachycardia)  Lipoma of neck  Atrial Tachycardia  Hypercholesteremia  Hypertension, Essential  Elective surgery  S/P tonsillectomy  S/P D&C (status post dilation and curettage)  H/O: Hysterectomy      Family history:  Family history of Parkinson's disease (Sibling)  Family history of CVA  Family history of renal failure      ROS:     General:  No weight loss, fevers, chills, night sweats, fatigue   Eyes:  No vision changes  ENT:  No sore throat, pain, runny nose  CV:  No chest pain, palpitations, dizziness   Resp:  No SOB, cough, wheezing  GI:  See HPI  :  No burning with urination, hematuria  Muscle:  No pain, weakness  Neuro:  No weakness/tingling, memory problems  Psych:  No fatigue, insomnia, mood problems, depression  Heme:  No easy bruisability  Skin:  No rash, edema      PHYSICAL EXAM:     GENERAL:  Well developed, no distress  HEENT:  NC/AT,  conjunctivae clear, sclera anicteric  CHEST:  Full & symmetric excursion, no increased effort w/ respirations  HEART:  Regular rhythm & rate  ABDOMEN:  Soft, tender, non-distended  EXTREMITIES:  no LE  edema  SKIN:  No rash/erythema/ecchymoses/petechiae/wounds/jaundice  NEURO:  Alert, oriented    Vital Signs:  Vital Signs Last 24 Hrs  T(C): 36.9 (04 Aug 2020 04:39), Max: 38.1 (03 Aug 2020 21:21)  T(F): 98.4 (04 Aug 2020 04:39), Max: 100.6 (03 Aug 2020 21:21)  HR: 76 (04 Aug 2020 04:39) (76 - 97)  BP: 104/61 (04 Aug 2020 04:39) (104/61 - 123/78)  BP(mean): --  RR: 18 (04 Aug 2020 04:39) (18 - 18)  SpO2: 95% (04 Aug 2020 04:39) (95% - 97%)  Daily     Daily     LABS:                        8.9    30.39 )-----------( 265      ( 03 Aug 2020 07:16 )             29.0     08-03    137  |  105  |  12  ----------------------------<  95  3.5   |  17<L>  |  0.90    Ca    7.9<L>      03 Aug 2020 16:51  Phos  1.3     08-03  Mg     1.7     08-03    TPro  5.5<L>  /  Alb  3.1<L>  /  TBili  0.4  /  DBili  x   /  AST  18  /  ALT  11  /  AlkPhos  44  08-03    LIVER FUNCTIONS - ( 03 Aug 2020 07:16 )  Alb: 3.1 g/dL / Pro: 5.5 g/dL / ALK PHOS: 44 U/L / ALT: 11 U/L / AST: 18 U/L / GGT: x           PT/INR - ( 02 Aug 2020 19:26 )   PT: 13.5 sec;   INR: 1.14 ratio         PTT - ( 02 Aug 2020 19:26 )  PTT:27.8 sec        Imaging:

## 2020-08-04 NOTE — PROGRESS NOTE ADULT - ATTENDING COMMENTS
Patient states that she still has watery diarrhea, one low grade fever last night .  WBC improved and improved WBC.  WIll cont Vancomycin and Flagyl for C Diff COlitis.  ID consult is called for evaluation for fecal transplant.  MOnitor BP , heart rate and fever curve.           Irene Diasre   Hospitalist   [phone]

## 2020-08-05 NOTE — PROGRESS NOTE ADULT - SUBJECTIVE AND OBJECTIVE BOX
Follow Up:  C-diff    Interval History: WBC improved to 14, afebrile, diarrhea slightly better    ROS:      All other systems negative    Constitutional: no fever, no chills  Cardiovascular:  no chest pain, no palpitation  Respiratory:  no SOB, no cough  GI:  no abd pain, no vomiting, still diarrhea but slightly better  urinary: no dysuria, no hematuria, no flank pain  skin:  no rash  neurology:  no headache, no seizure    Allergies  sulfa drugs (Unknown)        ANTIMICROBIALS:  metroNIDAZOLE  IVPB 500 every 8 hours  vancomycin    Solution 125 every 6 hours      OTHER MEDS:  acetaminophen   Tablet .. 650 milliGRAM(s) Oral every 6 hours PRN  atorvastatin 20 milliGRAM(s) Oral at bedtime  enoxaparin Injectable 40 milliGRAM(s) SubCutaneous daily  levothyroxine 50 MICROGram(s) Oral daily  potassium chloride    Tablet ER 40 milliEquivalent(s) Oral once  potassium phosphate IVPB 30 milliMole(s) IV Intermittent once  sotalol 40 milliGRAM(s) Oral every 12 hours      Vital Signs Last 24 Hrs  T(C): 36.3 (05 Aug 2020 14:13), Max: 37.3 (05 Aug 2020 05:27)  T(F): 97.4 (05 Aug 2020 14:13), Max: 99.1 (05 Aug 2020 05:27)  HR: 81 (05 Aug 2020 14:13) (71 - 81)  BP: 120/72 (05 Aug 2020 14:13) (120/72 - 145/78)  BP(mean): --  RR: 18 (05 Aug 2020 14:13) (18 - 18)  SpO2: 96% (05 Aug 2020 14:13) (92% - 98%)    Physical Exam:  General:    NAD,  non toxic  Head: atraumatic, normocephalic  Eye: normal sclera and conjunctiva  ENT:    no oropharyngeal lesions,   no LAD,   neck supple  Cardio:     regular S1, S2,  no murmur  Respiratory:    clear b/l,    no wheezing  abd:     soft,   BS +,   no tenderness,    no organomegaly  :   no CVAT,  no suprapubic tenderness,   no  walter  Musculoskeletal:   no joint swelling,   no edema  vascular: no phlebitis, normal pulses  Skin:    no rash  Neurologic:     no focal deficit  psych: normal affect                          9.4    14.62 )-----------( 358      ( 05 Aug 2020 12:25 )             29.4       08-05    139  |  106  |  12  ----------------------------<  165<H>  3.3<L>   |  16<L>  |  0.63    Ca    7.9<L>      05 Aug 2020 12:25  Phos  1.1     08-05  Mg     1.8     08-05    TPro  5.6<L>  /  Alb  3.1<L>  /  TBili  0.2  /  DBili  x   /  AST  15  /  ALT  10  /  AlkPhos  53  08-05          MICROBIOLOGY:  v  .Stool Feces  08-03-20   GI PCR Results: NOT detected      .Blood Blood-Peripheral  08-02-20   No growth to date.  --  --            Clostridium difficile [facility] Toxins A&amp;B, EIA:   Positive (08-02-20 @ 20:13)  Clostridium difficile [facility] Interpretation: Positive for toxigenic C. Difficile.      RADIOLOGY:  Images below independently visualized and reviewed personally, findings as below  < from: Xray Hip w/ Pelvis 2 or 3 Views, Right (08.05.20 @ 09:47) >  IMPRESSION:  No acute displaced fracture or dislocation is seen in the right hip. Mild arthritic changes of the right hip. Left total hip arthroplasty, femoral component only partially visualized. No diastasis of the pubic symphysis or bilateral sacroiliac joints. Degenerative changes in the lower lumbar spine.      < from: CT Abdomen and Pelvis w/ IV Cont (08.02.20 @ 21:28) >  IMPRESSION:  Pancolitis which could be secondary to the patient's known diagnosis of ulcerative colitis or due to an infectious etiology.

## 2020-08-05 NOTE — PROGRESS NOTE ADULT - ATTENDING COMMENTS
Continued diarrhea  Likely due to underlying UC  Flex sig tomorrow  May require prednisone and/or remicade

## 2020-08-05 NOTE — PROGRESS NOTE ADULT - PROBLEM SELECTOR PLAN 1
Unclear if this represents treatment failure or recurrence, given symptoms did not resolve fully after initial course of flagyl.  -Started on vanco PO 125mg q6h and flagyl IV 500mg q8h  -Given complicated C.Diff (failure of treatment, high white count, pancolitis, WBC >30 ect), will continue patient on PO vancomycin plus IV flagyl.   -CT abd/pelvis showing pancolitis without lymphadenopathy.   -WBC improving 36->30->21.8  -Trend BMP, replete lytes PRN. Will f/u CBC, lactate, monitor for toxic signs.   -Started on clear liquid diet to give patient's bowels some rest given pancolitis, although patient tolerating full diet at home. Will advance as tolerated.   -No concern for complications including toxic megacolon given no distension or peritoneal signs on exam, lactate wnl  -GI following, will appreciate recs. Unclear if this represents treatment failure or recurrence, given symptoms did not resolve fully after initial course of flagyl.  -Diarrhea unresolved, but less cramping, abdominal pain, no blood in stool.   -Started on vanco PO 125mg q6h and flagyl IV 500mg q8h  -Given complicated C.Diff (failure of treatment, high white count, pancolitis, WBC >30 ect), will continue patient on PO vancomycin plus IV flagyl.   -CT abd/pelvis showing pancolitis without lymphadenopathy.   -WBC improving 36->30->21.8  -Trend BMP, replete lytes PRN. Will f/u CBC, lactate, monitor for toxic signs.   -Advanced diet to full with low residual. Patient tolerating well.    -No concern for complications including toxic megacolon given no distension or peritoneal signs on exam, lactate wnl  -GI following, will appreciate recs.

## 2020-08-05 NOTE — PROGRESS NOTE ADULT - ASSESSMENT
86F PMH ulcerative colitis (on asacol 1600 TID), external hemorrhoids, SVT on sotolol, hypothyroid, HTN, HLD, admitted 5/15 - 5/20/20 for UC flare treated with IV steroids and d/c on prednisone, now presenting c diff colitis vs UC flare.       Impression:  #C diff colitis - severe disease, WBC 30k, fever, pancolitis on imaging; normal creatinine.   # Ulcerative colitis - recently on steroids for flare in May, now on PO mesalamine.    #SVT on sotalol        Recommendation:  - c/w PO vanc and IV flagyl given degree of leukocytosis and degree of pancolitis on imaging  - if any decompensation, hypotension, worsening leukocytosis or creatinine, rising lactate - will need surgery consultation  - monitor BM frequency and consistency  - check hepBs Ag, hep B sAb, quant gold pending, in case of need for biologic therapy        Cheryl Stanley PGY-4  Gastroenterology Fellow  Pager #[phone] or [phone]  Please call anwering service for on-call GI fellow ([phone]) after 5pm and before 8am, and on weekends. 86F PMH ulcerative colitis (on asacol 1600 TID), external hemorrhoids, SVT on sotolol, hypothyroid, HTN, HLD, admitted 5/15 - 5/20/20 for UC flare treated with IV steroids and d/c on prednisone, now presenting c diff colitis vs UC flare.       Impression:  #C diff colitis - severe disease, WBC 30k, fever, pancolitis on imaging; normal creatinine.   # Ulcerative colitis - recently on steroids for flare in May, now on PO mesalamine.    #SVT on sotalol        Recommendation:  - c/w PO vanc and IV flagyl given degree of leukocytosis and degree of pancolitis on imaging  - if any decompensation, hypotension, worsening leukocytosis or creatinine, rising lactate - will need surgery consultation  - monitor BM frequency and consistency  - hep serologies and quant noted   - given patient continues to have severe symptoms will plan for flex sig tomorrow to assess for inflammatory severity  - NPO at midnight  - GI fellow to order 2 tap water enemas prior to flex sig tomorrow        Cheryl Stanley PGY-4  Gastroenterology Fellow  Pager #[phone] or [phone]  Please call an[employer] service for on-call GI fellow ([phone]) after 5pm and before 8am, and on weekends.

## 2020-08-05 NOTE — PROGRESS NOTE ADULT - PROBLEM SELECTOR PLAN 3
Unlikely that this presentation represents UC flare as it is quite different from patient's previous flare - no abdominal pain, minimal bloody BM  -Outpatient follow-up Patient unsure exactly when this happened. She notes hitting side on wall when stumbling in rush to go to bathroom and also sitting awkwardly when getting on commode.   -Nontender to palpation without noticeable swelling, but pain with hip flexion or when changing positions  -Xray hip/pelvis ordered. Will f/u results  -Fall precautions, bedpan in addition to bedside commode  -Heat packs.   -Will give tylenol if needed for pain.

## 2020-08-05 NOTE — PROVIDER CONTACT NOTE (OTHER) - ACTION/TREATMENT ORDERED:
MD made aware, offered pt to place IVL but refused, requesting IV nurse, awaiting IV nurse, will continue to monitor.

## 2020-08-05 NOTE — PROGRESS NOTE ADULT - ASSESSMENT
85 f with HTN, HLD, SVT on sotalol, external hemorrhoids, UC was in remission until a year ago and was started on budesonide and tapered, then was recently admitted for UC flare and bloody diarrhea, was given steroids and again at home had bloody diarrhea, C-diff was checked 7/15 and was positive, she was given flagyl and the bloody diarrhea resolved but she still had diarrhea, now p/w fever and worsening diarrhea  here febrile to 101.8, WBC: 36  abd/pelvis CT with pan colitis  C-diff positive    fever, leukocytosis, sepsis  severe C-diff in the setting of ulcerative colitis, first recurrence or not fully treated first episode (as the diarrhea never resolved)    * c/w PO vanco 125 q 6, started 8/2, now day 5  * c/w IV flagyl 500 q 8  * monitor the WBC and temp curve, now afebrile and WBC improved to 14  * monitor the stool count  * f/u with GI    The above assessment and plan was discussed with the medicine resident    Ana Barrow MD  Pager [phone]  After 5pm and on weekends call [phone]

## 2020-08-05 NOTE — PROGRESS NOTE ADULT - ATTENDING COMMENTS
Severe C diff colitis improving on Oral Vanco and IV Metronidazole with improving WBC, and diarrhea.  Possible Flex sig by GI is pending .  cont to closely monitor patient       Irene Vera   Hospitalist   [phone]

## 2020-08-05 NOTE — PROGRESS NOTE ADULT - SUBJECTIVE AND OBJECTIVE BOX
PROGRESS NOTE:   Authored by Jose D Gonzales MD, PGY1 [facility] Pager [phone] [facility] pager [phone]    Patient is a 86y old  Female who presents with a chief complaint of 86F sent in by GI for persistent diarrhea (05 Aug 2020 07:32)      SUBJECTIVE / OVERNIGHT EVENTS:     REVIEW OF SYSTEMS:    CONSTITUTIONAL: No weakness, fevers or chills  EYES/ENT: No visual changes;  No vertigo or throat pain   NECK: No pain or stiffness  RESPIRATORY: No cough, wheezing, hemoptysis; No shortness of breath  CARDIOVASCULAR: No chest pain or palpitations  GASTROINTESTINAL: No abdominal or epigastric pain. No nausea, vomiting, or hematemesis; No diarrhea or constipation. No melena or hematochezia.  GENITOURINARY: No dysuria, frequency or hematuria  NEUROLOGICAL: No numbness or weakness  SKIN: No itching, rashes    MEDICATIONS  (STANDING):  atorvastatin 20 milliGRAM(s) Oral at bedtime  enoxaparin Injectable 40 milliGRAM(s) SubCutaneous daily  levothyroxine 50 MICROGram(s) Oral daily  metroNIDAZOLE  IVPB 500 milliGRAM(s) IV Intermittent every 8 hours  sotalol 40 milliGRAM(s) Oral every 12 hours  vancomycin    Solution 125 milliGRAM(s) Oral every 6 hours    MEDICATIONS  (PRN):  acetaminophen   Tablet .. 650 milliGRAM(s) Oral every 6 hours PRN Temp greater or equal to 38C (100.4F), Mild Pain (1 - 3)      CAPILLARY BLOOD GLUCOSE        I&O's Summary    04 Aug 2020 07:01  -  05 Aug 2020 07:00  --------------------------------------------------------  IN: 150 mL / OUT: 350 mL / NET: -200 mL        PHYSICAL EXAM:  Vital Signs Last 24 Hrs  T(C): 37.3 (05 Aug 2020 05:27), Max: 37.3 (05 Aug 2020 05:27)  T(F): 99.1 (05 Aug 2020 05:27), Max: 99.1 (05 Aug 2020 05:27)  HR: 79 (05 Aug 2020 05:27) (71 - 79)  BP: 145/78 (05 Aug 2020 05:27) (117/68 - 145/78)  BP(mean): --  RR: 18 (05 Aug 2020 05:27) (18 - 18)  SpO2: 92% (05 Aug 2020 05:27) (92% - 98%)    CONSTITUTIONAL: NAD, well-developed  RESPIRATORY: Normal respiratory effort; lungs are clear to auscultation bilaterally  CARDIOVASCULAR: Regular rate and rhythm, normal S1 and S2, no murmur/rub/gallop; No lower extremity edema; Peripheral pulses are 2+ bilaterally  ABDOMEN: Nontender to palpation, normoactive bowel sounds, no rebound/guarding; No hepatosplenomegaly  MUSCLOSKELETAL: no clubbing or cyanosis of digits; no joint swelling or tenderness to palpation  PSYCH: A+O to person, place, and time; affect appropriate  NEURO: Non-focal, no tremors  SKIN: No rashes    LABS:                        9.1    21.80 )-----------( 276      ( 04 Aug 2020 10:23 )             29.3     08-04    140  |  107  |  8   ----------------------------<  94  3.4<L>   |  20<L>  |  0.71    Ca    7.7<L>      04 Aug 2020 10:23  Phos  2.2     08-04  Mg     1.8     08-04    TPro  5.4<L>  /  Alb  3.0<L>  /  TBili  0.4  /  DBili  x   /  AST  19  /  ALT  9<L>  /  AlkPhos  54  08-04              GI PCR Panel, Stool (collected 03 Aug 2020 14:33)  Source: .Stool Feces  Final Report (03 Aug 2020 18:33):    GI PCR Results: NOT detected    *******Please Note:*******    GI panel PCR evaluates for:    Campylobacter, Plesiomonas shigelloides, Salmonella,    Vibrio, Yersinia enterocolitica, Enteroaggregative    Escherichia coli (EAEC), Enteropathogenic E.coli (EPEC),    Enterotoxigenic E. coli (ETEC) lt/st, Shiga-like    toxin-producing E. coli (STEC) stx1/stx2,    Shigella/ Enteroinvasive E. coli (EIEC), Cryptosporidium,    Cyclospora cayetanensis, Entamoeba histolytica,    Giardia lamblia, Adenovirus F 40/41, Astrovirus,    Norovirus GI/GII, Rotavirus A, Sapovirus    Culture - Blood (collected 02 Aug 2020 22:08)  Source: .Blood Blood-Peripheral  Preliminary Report (03 Aug 2020 23:01):    No growth to date.    Culture - Blood (collected 02 Aug 2020 22:08)  Source: .Blood Blood-Peripheral  Preliminary Report (03 Aug 2020 23:01):    No growth to date.        RADIOLOGY & ADDITIONAL TESTS:  No new imaging or tests    COORDINATION OF CARE:  Care Discussed with Consultants/Other Providers [Y/N]:  Prior or Outpatient Records Reviewed [Y/N]: PROGRESS NOTE:   Authored by Jose D Gonzales MD, PGY1 [facility] Pager [phone] [facility] pager [phone]    Patient is a 86y old  Female who presents with a chief complaint of 86F sent in by GI for persistent diarrhea (05 Aug 2020 07:32)      SUBJECTIVE / OVERNIGHT EVENTS: Patient notes that she had 8 bowel movements over a 16 hour span yesterday. Other than the diarrhea, her other symptoms are improving. She is no longer having cramping with her bowel movements, no blood in stool, and has had no fevers in 2 days. Pt also notes hurting her right hip and neck at some point when stumbling to go to the bathroom. Hip is not very painful at rest but hurts when patient tries to shift positions. Denies chest pain, sob, abdominal pain, nausea, vomiting, dysuria or urinary frequency. Leg pain is improved.     REVIEW OF SYSTEMS:    CONSTITUTIONAL: No weakness, fevers or chills  EYES/ENT: No visual changes;  No vertigo or throat pain   NECK: +pain with movment  RESPIRATORY: No cough, wheezing, hemoptysis; No shortness of breath  CARDIOVASCULAR: No chest pain or palpitations  GASTROINTESTINAL: +diarrhea. No abdominal or epigastric pain. No nausea, vomiting, or hematemesis; No melena or hematochezia.  GENITOURINARY: No dysuria, frequency or hematuria  NEUROLOGICAL: No numbness or weakness  SKIN: No itching, rashes    MEDICATIONS  (STANDING):  atorvastatin 20 milliGRAM(s) Oral at bedtime  enoxaparin Injectable 40 milliGRAM(s) SubCutaneous daily  levothyroxine 50 MICROGram(s) Oral daily  metroNIDAZOLE  IVPB 500 milliGRAM(s) IV Intermittent every 8 hours  sotalol 40 milliGRAM(s) Oral every 12 hours  vancomycin    Solution 125 milliGRAM(s) Oral every 6 hours    MEDICATIONS  (PRN):  acetaminophen   Tablet .. 650 milliGRAM(s) Oral every 6 hours PRN Temp greater or equal to 38C (100.4F), Mild Pain (1 - 3)      CAPILLARY BLOOD GLUCOSE        I&O's Summary    04 Aug 2020 07:01  -  05 Aug 2020 07:00  --------------------------------------------------------  IN: 150 mL / OUT: 350 mL / NET: -200 mL        PHYSICAL EXAM:  Vital Signs Last 24 Hrs  T(C): 37.3 (05 Aug 2020 05:27), Max: 37.3 (05 Aug 2020 05:27)  T(F): 99.1 (05 Aug 2020 05:27), Max: 99.1 (05 Aug 2020 05:27)  HR: 79 (05 Aug 2020 05:27) (71 - 79)  BP: 145/78 (05 Aug 2020 05:27) (117/68 - 145/78)  BP(mean): --  RR: 18 (05 Aug 2020 05:27) (18 - 18)  SpO2: 92% (05 Aug 2020 05:27) (92% - 98%)    CONSTITUTIONAL: NAD, well-developed  RESPIRATORY: Normal respiratory effort; lungs are clear to auscultation bilaterally  CARDIOVASCULAR: Regular rate and rhythm, normal S1 and S2, no murmur/rub/gallop; No lower extremity edema; Peripheral pulses are 2+ bilaterally  ABDOMEN: Tenderness to palpation in LLQ and RUQ. Tenderness improved from yesterday, especially in LLQ. normoactive bowel sounds, no rebound/guarding; No hepatosplenomegaly  MUSCLOSKELETAL: Full ROM of hips b/l. Pain with flexion at hip and with changing position to sit up. Also mild left-sided neck pain when turning head. no clubbing or cyanosis of digits; no joint swelling or tenderness to palpation  PSYCH: A+O to person, place, and time; affect appropriate  NEURO: Non-focal, no tremors  SKIN: No rashes    LABS:                        9.1    21.80 )-----------( 276      ( 04 Aug 2020 10:23 )             29.3     08-04    140  |  107  |  8   ----------------------------<  94  3.4<L>   |  20<L>  |  0.71    Ca    7.7<L>      04 Aug 2020 10:23  Phos  2.2     08-04  Mg     1.8     08-04    TPro  5.4<L>  /  Alb  3.0<L>  /  TBili  0.4  /  DBili  x   /  AST  19  /  ALT  9<L>  /  AlkPhos  54  08-04              GI PCR Panel, Stool (collected 03 Aug 2020 14:33)  Source: .Stool Feces  Final Report (03 Aug 2020 18:33):    GI PCR Results: NOT detected    *******Please Note:*******    GI panel PCR evaluates for:    Campylobacter, Plesiomonas shigelloides, Salmonella,    Vibrio, Yersinia enterocolitica, Enteroaggregative    Escherichia coli (EAEC), Enteropathogenic E.coli (EPEC),    Enterotoxigenic E. coli (ETEC) lt/st, Shiga-like    toxin-producing E. coli (STEC) stx1/stx2,    Shigella/ Enteroinvasive E. coli (EIEC), Cryptosporidium,    Cyclospora cayetanensis, Entamoeba histolytica,    Giardia lamblia, Adenovirus F 40/41, Astrovirus,    Norovirus GI/GII, Rotavirus A, Sapovirus    Culture - Blood (collected 02 Aug 2020 22:08)  Source: .Blood Blood-Peripheral  Preliminary Report (03 Aug 2020 23:01):    No growth to date.    Culture - Blood (collected 02 Aug 2020 22:08)  Source: .Blood Blood-Peripheral  Preliminary Report (03 Aug 2020 23:01):    No growth to date.        RADIOLOGY & ADDITIONAL TESTS:  No new imaging or tests    COORDINATION OF CARE:  Care Discussed with Consultants/Other Providers [Y/N]:  Prior or Outpatient Records Reviewed [Y/N]:

## 2020-08-05 NOTE — PROGRESS NOTE ADULT - PROBLEM SELECTOR PLAN 4
-C/w sotalol. Will monitor off tele for now, reassess if any concern for runs of SVT. Unlikely that this presentation represents UC flare as it is quite different from patient's previous flare - no abdominal pain, minimal bloody BM  -Outpatient follow-up

## 2020-08-05 NOTE — PROGRESS NOTE ADULT - PROBLEM SELECTOR PLAN 5
-Hold antihypertensives for now. -C/w sotalol. Will monitor off tele for now, reassess if any concern for runs of SVT.

## 2020-08-05 NOTE — PROGRESS NOTE ADULT - SUBJECTIVE AND OBJECTIVE BOX
Chief Complaint:  Patient is a 86y old  Female who presents with a chief complaint of 86F sent in by GI for persistent diarrhea (04 Aug 2020 13:12)      Interval Events: No acute events overnight    Allergies:  sulfa drugs (Unknown)      Hospital Medications:  acetaminophen   Tablet .. 650 milliGRAM(s) Oral every 6 hours PRN  atorvastatin 20 milliGRAM(s) Oral at bedtime  enoxaparin Injectable 40 milliGRAM(s) SubCutaneous daily  levothyroxine 50 MICROGram(s) Oral daily  metroNIDAZOLE  IVPB 500 milliGRAM(s) IV Intermittent every 8 hours  sotalol 40 milliGRAM(s) Oral every 12 hours  vancomycin    Solution 125 milliGRAM(s) Oral every 6 hours      PMHX/PSHX:  Colitis  Atrial fibrillation with rapid ventricular response  Hypothyroid  SVT (supraventricular tachycardia)  Lipoma of neck  Atrial Tachycardia  Hypercholesteremia  Hypertension, Essential  Elective surgery  S/P tonsillectomy  S/P D&C (status post dilation and curettage)  H/O: Hysterectomy      Family history:  Family history of Parkinson's disease (Sibling)  Family history of CVA  Family history of renal failure      ROS:     General:  No weight loss, fevers, chills, night sweats, fatigue   Eyes:  No vision changes  ENT:  No sore throat, pain, runny nose  CV:  No chest pain, palpitations, dizziness   Resp:  No SOB, cough, wheezing  GI:  See HPI  :  No burning with urination, hematuria  Muscle:  No pain, weakness  Neuro:  No weakness/tingling, memory problems  Psych:  No fatigue, insomnia, mood problems, depression  Heme:  No easy bruisability  Skin:  No rash, edema      PHYSICAL EXAM:     GENERAL:  Well developed, no distress  HEENT:  NC/AT,  conjunctivae clear, sclera anicteric  CHEST:  Full & symmetric excursion, no increased effort w/ respirations  HEART:  Regular rhythm & rate  ABDOMEN:  Soft, non-tender, non-distended  EXTREMITIES:  no LE  edema  SKIN:  No rash/erythema/ecchymoses/petechiae/wounds/jaundice  NEURO:  Alert, oriented    Vital Signs:  Vital Signs Last 24 Hrs  T(C): 37.3 (05 Aug 2020 05:27), Max: 37.3 (05 Aug 2020 05:27)  T(F): 99.1 (05 Aug 2020 05:27), Max: 99.1 (05 Aug 2020 05:27)  HR: 79 (05 Aug 2020 05:27) (71 - 79)  BP: 145/78 (05 Aug 2020 05:27) (117/68 - 145/78)  BP(mean): --  RR: 18 (05 Aug 2020 05:27) (18 - 18)  SpO2: 92% (05 Aug 2020 05:27) (92% - 98%)  Daily     Daily     LABS:                        9.1    21.80 )-----------( 276      ( 04 Aug 2020 10:23 )             29.3     08-04    140  |  107  |  8   ----------------------------<  94  3.4<L>   |  20<L>  |  0.71    Ca    7.7<L>      04 Aug 2020 10:23  Phos  2.2     08-04  Mg     1.8     08-04    TPro  5.4<L>  /  Alb  3.0<L>  /  TBili  0.4  /  DBili  x   /  AST  19  /  ALT  9<L>  /  AlkPhos  54  08-04    LIVER FUNCTIONS - ( 04 Aug 2020 10:23 )  Alb: 3.0 g/dL / Pro: 5.4 g/dL / ALK PHOS: 54 U/L / ALT: 9 U/L / AST: 19 U/L / GGT: x                   Imaging: Chief Complaint:  Patient is a 86y old  Female who presents with a chief complaint of 86F sent in by GI for persistent diarrhea (04 Aug 2020 13:12)      Interval Events: No acute events overnight. Patient seen this morning, states 8 BM watery yesterday, 2 this morning.     Allergies:  sulfa drugs (Unknown)      Hospital Medications:  acetaminophen   Tablet .. 650 milliGRAM(s) Oral every 6 hours PRN  atorvastatin 20 milliGRAM(s) Oral at bedtime  enoxaparin Injectable 40 milliGRAM(s) SubCutaneous daily  levothyroxine 50 MICROGram(s) Oral daily  metroNIDAZOLE  IVPB 500 milliGRAM(s) IV Intermittent every 8 hours  sotalol 40 milliGRAM(s) Oral every 12 hours  vancomycin    Solution 125 milliGRAM(s) Oral every 6 hours      PMHX/PSHX:  Colitis  Atrial fibrillation with rapid ventricular response  Hypothyroid  SVT (supraventricular tachycardia)  Lipoma of neck  Atrial Tachycardia  Hypercholesteremia  Hypertension, Essential  Elective surgery  S/P tonsillectomy  S/P D&C (status post dilation and curettage)  H/O: Hysterectomy      Family history:  Family history of Parkinson's disease (Sibling)  Family history of CVA  Family history of renal failure      ROS:     General:  No weight loss, fevers, chills  Eyes:  No vision changes  ENT:  No sore throat, pain, runny nose  CV:  No chest pain, palpitations, dizziness   Resp:  No SOB, cough, wheezing  GI:  See HPI  :  No burning with urination, hematuria  Muscle:  No pain, weakness  Neuro:  No weakness/tingling, memory problems  Psych:  No fatigue, insomnia, mood problems, depression  Heme:  No easy bruisability  Skin:  No rash, edema      PHYSICAL EXAM:     GENERAL:  Well developed, no distress  HEENT:  NC/AT,  conjunctivae clear, sclera anicteric  CHEST:  Full & symmetric excursion, no increased effort w/ respirations  HEART:  Regular rhythm & rate  ABDOMEN:  Soft, non-tender, non-distended  EXTREMITIES:  no LE  edema  SKIN:  No rash/erythema/ecchymoses/petechiae/wounds/jaundice  NEURO:  Alert, oriented    Vital Signs:  Vital Signs Last 24 Hrs  T(C): 37.3 (05 Aug 2020 05:27), Max: 37.3 (05 Aug 2020 05:27)  T(F): 99.1 (05 Aug 2020 05:27), Max: 99.1 (05 Aug 2020 05:27)  HR: 79 (05 Aug 2020 05:27) (71 - 79)  BP: 145/78 (05 Aug 2020 05:27) (117/68 - 145/78)  BP(mean): --  RR: 18 (05 Aug 2020 05:27) (18 - 18)  SpO2: 92% (05 Aug 2020 05:27) (92% - 98%)  Daily     Daily     LABS:                        9.1    21.80 )-----------( 276      ( 04 Aug 2020 10:23 )             29.3     08-04    140  |  107  |  8   ----------------------------<  94  3.4<L>   |  20<L>  |  0.71    Ca    7.7<L>      04 Aug 2020 10:23  Phos  2.2     08-04  Mg     1.8     08-04    TPro  5.4<L>  /  Alb  3.0<L>  /  TBili  0.4  /  DBili  x   /  AST  19  /  ALT  9<L>  /  AlkPhos  54  08-04    LIVER FUNCTIONS - ( 04 Aug 2020 10:23 )  Alb: 3.0 g/dL / Pro: 5.4 g/dL / ALK PHOS: 54 U/L / ALT: 9 U/L / AST: 19 U/L / GGT: x                   Imaging:

## 2020-08-06 NOTE — PROGRESS NOTE ADULT - PROBLEM SELECTOR PLAN 1
Unclear if this represents treatment failure or recurrence, given symptoms did not resolve fully after initial course of flagyl.  -Diarrhea unresolved, but less cramping, abdominal pain, no blood in stool.   -Started on vanco PO 125mg q6h and flagyl IV 500mg q8h  -Given complicated C.Diff (failure of treatment, high white count, pancolitis, WBC >30 ect), will continue patient on PO vancomycin plus IV flagyl.   -CT abd/pelvis showing pancolitis without lymphadenopathy.   -WBC improving 36->30->21.8  -Trend BMP, replete lytes PRN. Will f/u CBC, lactate, monitor for toxic signs.   -Advanced diet to full with low residual. Patient tolerating well.    -No concern for complications including toxic megacolon given no distension or peritoneal signs on exam, lactate wnl  -GI following, will appreciate recs. Unclear if this represents treatment failure or recurrence, given symptoms did not resolve fully after initial course of flagyl.  -Diarrhea improving with less cramping, abdominal pain, no blood in stool.   -Started on vanco PO 125mg q6h and flagyl IV 500mg q8h  -Given complicated C.Diff (failure of treatment, high white count, pancolitis, WBC >30 ect), will continue patient on PO vancomycin plus IV flagyl.   -CT abd/pelvis showing pancolitis without lymphadenopathy.   -WBC improving 36->30->21.8->14.6  -Trend BMP, replete lytes PRN. Will f/u CBC, lactate, monitor for toxic signs.   -Advanced diet to full with low residual. Patient tolerating well.    -No concern for complications including toxic megacolon given no distension or peritoneal signs on exam, lactate wnl  -Patient to go for flexible sigmoidoscopy today to evaluate extensiveness of ulcerative colitis as a contributing factor to patient's diarrhea. Will f/u on results with GI.

## 2020-08-06 NOTE — PROGRESS NOTE ADULT - SUBJECTIVE AND OBJECTIVE BOX
Follow Up:  C-diff    Interval History: s/p flex sig today, pseudomembranous colitis was noticed    ROS:      All other systems negative    Constitutional: no fever, no chills  Cardiovascular:  no chest pain, no palpitation  Respiratory:  no SOB, no cough  GI:  no abd pain, no vomiting  urinary: no dysuria, no hematuria, no flank pain  skin:  no rash  neurology:  no headache, no seizure          Allergies  sulfa drugs (Unknown)        ANTIMICROBIALS:  metroNIDAZOLE  IVPB 500 every 8 hours  vancomycin    Solution 125 every 6 hours      OTHER MEDS:  acetaminophen   Tablet .. 650 milliGRAM(s) Oral every 6 hours PRN  atorvastatin 20 milliGRAM(s) Oral at bedtime  enoxaparin Injectable 40 milliGRAM(s) SubCutaneous daily  levothyroxine 50 MICROGram(s) Oral daily  sotalol 40 milliGRAM(s) Oral every 12 hours      Vital Signs Last 24 Hrs  T(C): 36.8 (06 Aug 2020 14:04), Max: 37.4 (05 Aug 2020 21:16)  T(F): 98.3 (06 Aug 2020 14:04), Max: 99.4 (05 Aug 2020 21:16)  HR: 73 (06 Aug 2020 14:45) (70 - 80)  BP: 136/71 (06 Aug 2020 14:45) (127/73 - 156/76)  BP(mean): --  RR: 18 (06 Aug 2020 14:04) (18 - 18)  SpO2: 98% (06 Aug 2020 14:04) (95% - 98%)    Physical Exam:  General:    NAD,  non toxic  Cardio:     regular S1, S2,  no murmur  Respiratory:    clear b/l,    no wheezing  abd:     soft,   BS +,   no tenderness,    no organomegaly  :   no CVAT,  no suprapubic tenderness,   no  walter  Musculoskeletal:   no joint swelling,   no edema  vascular: no phlebitis, normal pulses  Skin:    no rash  Neurologic:     no focal deficit  psych: normal affect                          9.6    13.81 )-----------( 384      ( 06 Aug 2020 15:57 )             30.6       08-06    140  |  106  |  9   ----------------------------<  102<H>  3.5   |  19<L>  |  0.67    Ca    8.5      06 Aug 2020 15:57  Phos  2.2     08-06  Mg     1.6     08-06    TPro  5.9<L>  /  Alb  3.2<L>  /  TBili  0.4  /  DBili  x   /  AST  16  /  ALT  11  /  AlkPhos  48  08-06          MICROBIOLOGY:  v  .Stool Feces  08-03-20   GI PCR Results: NOT detected        .Blood Blood-Peripheral  08-02-20   No growth to date.  --  --            Clostridium difficile [facility] Toxins A&amp;B, EIA:   Positive (08-02-20 @ 20:13)  Clostridium difficile [facility] Interpretation: Positive for toxigenic C. Difficile.      RADIOLOGY:  Images below independently visualized and reviewed personally, findings as below  < from: Xray Hip w/ Pelvis 2 or 3 Views, Right (08.05.20 @ 09:47) >    IMPRESSION:  No acute displaced fracture or dislocation is seen in the right hip. Mild arthritic changes of the right hip. Left total hip arthroplasty, femoral component only partially visualized. No diastasis of the pubic symphysis or bilateral sacroiliac joints. Degenerative changes in the lower lumbar spine.    < from: CT Abdomen and Pelvis w/ IV Cont (08.02.20 @ 21:28) >  IMPRESSION:  Pancolitis which could be secondary to the patient's known diagnosis of ulcerative colitis or due to an infectious etiology.      < from: Flexible Sigmoidoscopy (08.06.20 @ 09:58) >  Impression:          - Non-thrombosed external hemorrhoids found on perianal exam.                       - Pseudomembranous enterocolitis, consistent with diagnosed c diff.                       - Inflammation was found in the sigmoid colon secondary to ulcerative                        colitis. Biopsied.

## 2020-08-06 NOTE — PROGRESS NOTE ADULT - ASSESSMENT
85 f with HTN, HLD, SVT on sotalol, external hemorrhoids, UC was in remission until a year ago and was started on budesonide and tapered, then was recently admitted for UC flare and bloody diarrhea, was given steroids and again at home had bloody diarrhea, C-diff was checked 7/15 and was positive, she was given flagyl and the bloody diarrhea resolved but she still had diarrhea, now p/w fever and worsening diarrhea  here febrile to 101.8, WBC: 36  abd/pelvis CT with pan colitis  C-diff positive    fever, leukocytosis, sepsis  severe C-diff in the setting of ulcerative colitis, first recurrence or not fully treated first episode (as the diarrhea never resolved)  * s/p flex sig today and membranous colitis was noticed and biopsied  * c/w PO vanco 125 q 6, started 8/2, now day 6  * c/w IV flagyl 500 q 8  * monitor the WBC and temp curve, now afebrile and WBC improved to 14  * monitor the stool count  * f/u with GI    The above assessment and plan was discussed with the medicine resident    Ana Barrow MD  Pager [phone]  After 5pm and on weekends call [phone]

## 2020-08-06 NOTE — PROGRESS NOTE ADULT - PROBLEM SELECTOR PLAN 3
Patient unsure exactly when this happened. She notes hitting side on wall when stumbling in rush to go to bathroom and also sitting awkwardly when getting on commode.   -Nontender to palpation without noticeable swelling, but pain with hip flexion or when changing positions  -Xray hip/pelvis ordered. Will f/u results  -Fall precautions, bedpan in addition to bedside commode  -Heat packs.   -Will give tylenol if needed for pain. Patient unsure exactly when this happened. She notes hitting side on wall when stumbling in rush to go to bathroom and also sitting awkwardly when getting on commode.   -Nontender to palpation without noticeable swelling, but pain with hip flexion or when changing positions. Overall pain improved today, relieved by tylenol.  -Negative hip Xray, no acute fractures.   -Fall precautions, bedpan in addition to bedside commode  -Heat packs.   -Tylenol PRN for pain.

## 2020-08-06 NOTE — PHYSICAL THERAPY INITIAL EVALUATION ADULT - GAIT DEVIATIONS NOTED, PT EVAL
increased time in double stance/decreased velocity of limb motion/decreased moon/decreased step length/decreased weight-shifting ability/occasionally stepped outside DUNIA, able to self correct with steppage pattern/decreased stride length/decreased swing-to-stance ratio

## 2020-08-06 NOTE — PROGRESS NOTE ADULT - PROBLEM SELECTOR PLAN 2
-S/p IVF in ED  -Treatment of C. diff as above  -Bcx, GIcx negative  - hold antihypertensives -S/p IVF in ED  -Treatment of C. diff as above  -Bcx, GIcx negative  - hold antihypertensives.  -No fever, hypotension for 3 days. WBC improving.   -Lactate 2.4, will continue to trend.

## 2020-08-06 NOTE — PHYSICAL THERAPY INITIAL EVALUATION ADULT - ADDITIONAL COMMENTS
Pt reports she lives with her  (89) in a split level private house with 1 steps to enter, 6 steps to first floor, & 8 steps to bedroom on 2nd floor. Pt was independent with all mobility & active, 60 min of stretching & amb 1 mile daily.

## 2020-08-06 NOTE — PROGRESS NOTE ADULT - SUBJECTIVE AND OBJECTIVE BOX
PROGRESS NOTE:   Authored by Jose D Gonzales MD, PGY1 [facility] Pager [phone] [facility] pager [phone]    Patient is a 86y old  Female who presents with a chief complaint of 86F sent in by GI for persistent diarrhea (05 Aug 2020 14:24)      SUBJECTIVE / OVERNIGHT EVENTS:     REVIEW OF SYSTEMS:    CONSTITUTIONAL: No weakness, fevers or chills  EYES/ENT: No visual changes;  No vertigo or throat pain   NECK: No pain or stiffness  RESPIRATORY: No cough, wheezing, hemoptysis; No shortness of breath  CARDIOVASCULAR: No chest pain or palpitations  GASTROINTESTINAL: No abdominal or epigastric pain. No nausea, vomiting, or hematemesis; No diarrhea or constipation. No melena or hematochezia.  GENITOURINARY: No dysuria, frequency or hematuria  NEUROLOGICAL: No numbness or weakness  SKIN: No itching, rashes    MEDICATIONS  (STANDING):  atorvastatin 20 milliGRAM(s) Oral at bedtime  enoxaparin Injectable 40 milliGRAM(s) SubCutaneous daily  levothyroxine 50 MICROGram(s) Oral daily  metroNIDAZOLE  IVPB 500 milliGRAM(s) IV Intermittent every 8 hours  sotalol 40 milliGRAM(s) Oral every 12 hours  vancomycin    Solution 125 milliGRAM(s) Oral every 6 hours    MEDICATIONS  (PRN):  acetaminophen   Tablet .. 650 milliGRAM(s) Oral every 6 hours PRN Temp greater or equal to 38C (100.4F), Mild Pain (1 - 3)      CAPILLARY BLOOD GLUCOSE        I&O's Summary      PHYSICAL EXAM:  Vital Signs Last 24 Hrs  T(C): 36.6 (06 Aug 2020 05:16), Max: 37.4 (05 Aug 2020 21:16)  T(F): 97.9 (06 Aug 2020 05:16), Max: 99.4 (05 Aug 2020 21:16)  HR: 73 (06 Aug 2020 05:16) (73 - 81)  BP: 127/73 (06 Aug 2020 05:16) (120/72 - 156/76)  BP(mean): --  RR: 18 (06 Aug 2020 05:16) (18 - 18)  SpO2: 95% (06 Aug 2020 05:16) (95% - 97%)    CONSTITUTIONAL: NAD, well-developed  RESPIRATORY: Normal respiratory effort; lungs are clear to auscultation bilaterally  CARDIOVASCULAR: Regular rate and rhythm, normal S1 and S2, no murmur/rub/gallop; No lower extremity edema; Peripheral pulses are 2+ bilaterally  ABDOMEN: Nontender to palpation, normoactive bowel sounds, no rebound/guarding; No hepatosplenomegaly  MUSCLOSKELETAL: no clubbing or cyanosis of digits; no joint swelling or tenderness to palpation  PSYCH: A+O to person, place, and time; affect appropriate  NEURO: Non-focal, no tremors  SKIN: No rashes    LABS:                        9.4    14.62 )-----------( 358      ( 05 Aug 2020 12:25 )             29.4     08-05    139  |  106  |  12  ----------------------------<  165<H>  3.3<L>   |  16<L>  |  0.63    Ca    7.9<L>      05 Aug 2020 12:25  Phos  1.1     08-05  Mg     1.8     08-05    TPro  5.6<L>  /  Alb  3.1<L>  /  TBili  0.2  /  DBili  x   /  AST  15  /  ALT  10  /  AlkPhos  53  08-05              GI PCR Panel, Stool (collected 03 Aug 2020 14:33)  Source: .Stool Feces  Final Report (03 Aug 2020 18:33):    GI PCR Results: NOT detected    *******Please Note:*******    GI panel PCR evaluates for:    Campylobacter, Plesiomonas shigelloides, Salmonella,    Vibrio, Yersinia enterocolitica, Enteroaggregative    Escherichia coli (EAEC), Enteropathogenic E.coli (EPEC),    Enterotoxigenic E. coli (ETEC) lt/st, Shiga-like    toxin-producing E. coli (STEC) stx1/stx2,    Shigella/ Enteroinvasive E. coli (EIEC), Cryptosporidium,    Cyclospora cayetanensis, Entamoeba histolytica,    Giardia lamblia, Adenovirus F 40/41, Astrovirus,    Norovirus GI/GII, Rotavirus A, Sapovirus        RADIOLOGY & ADDITIONAL TESTS:  No new imaging or tests    COORDINATION OF CARE:  Care Discussed with Consultants/Other Providers [Y/N]:  Prior or Outpatient Records Reviewed [Y/N]: PROGRESS NOTE:   Authored by Jose D Gonzales MD, PGY1 [facility] Pager [phone] [facility] pager [phone]    Patient is a 86y old  Female who presents with a chief complaint of 86F sent in by GI for persistent diarrhea (05 Aug 2020 14:24)      SUBJECTIVE / OVERNIGHT EVENTS: No acute events overnight. Patient notes she went about 16 hours without having a bowel movement yesterday until she had one early this morning/late last night. Notes that her abdominal pain is improved overall, and her abdomen is just "sore" now like it's overworked. She did not notice any blood in her last few stools. She also notes that when she gets up she feels a little unsteady on her feet, but not dizzy or light-headed. Received tylenol for her hip/neck pain overnight, which she notes helped. Currently her neck pain is almost gone and her hip pain is much improved. Denies fevers, sweats, chills, chest pain, SOB, nausea or vomiting, or leg pain.    REVIEW OF SYSTEMS:    CONSTITUTIONAL: No weakness, fevers or chills  EYES/ENT: No visual changes;  No vertigo or throat pain   NECK: No pain or stiffness  RESPIRATORY: No cough, wheezing, hemoptysis; No shortness of breath  CARDIOVASCULAR: No chest pain or palpitations  GASTROINTESTINAL: +diarrhea, mild abdominal pain. No nausea, vomiting, or hematemesis; No constipation. No melena or hematochezia.  GENITOURINARY: No dysuria, frequency or hematuria  NEUROLOGICAL: No numbness or weakness  SKIN: No itching, rashes    MEDICATIONS  (STANDING):  atorvastatin 20 milliGRAM(s) Oral at bedtime  enoxaparin Injectable 40 milliGRAM(s) SubCutaneous daily  levothyroxine 50 MICROGram(s) Oral daily  metroNIDAZOLE  IVPB 500 milliGRAM(s) IV Intermittent every 8 hours  sotalol 40 milliGRAM(s) Oral every 12 hours  vancomycin    Solution 125 milliGRAM(s) Oral every 6 hours    MEDICATIONS  (PRN):  acetaminophen   Tablet .. 650 milliGRAM(s) Oral every 6 hours PRN Temp greater or equal to 38C (100.4F), Mild Pain (1 - 3)      CAPILLARY BLOOD GLUCOSE        I&O's Summary      PHYSICAL EXAM:  Vital Signs Last 24 Hrs  T(C): 36.6 (06 Aug 2020 05:16), Max: 37.4 (05 Aug 2020 21:16)  T(F): 97.9 (06 Aug 2020 05:16), Max: 99.4 (05 Aug 2020 21:16)  HR: 73 (06 Aug 2020 05:16) (73 - 81)  BP: 127/73 (06 Aug 2020 05:16) (120/72 - 156/76)  BP(mean): --  RR: 18 (06 Aug 2020 05:16) (18 - 18)  SpO2: 95% (06 Aug 2020 05:16) (95% - 97%)    CONSTITUTIONAL: NAD, well-developed  RESPIRATORY: Normal respiratory effort; lungs are clear to auscultation bilaterally  CARDIOVASCULAR: Regular rate and rhythm, normal S1 and S2, no murmur/rub/gallop; +b/l 2+ pitting edema of LE. Peripheral pulses are 2+ bilaterally  ABDOMEN: Tender to palpation in RUQ. No rebound or guarding. Soft, nondistended with bowel sounds present. No palpable hepatosplenomegaly.   MUSCULOSKELETAL: no clubbing or cyanosis of digits; no joint swelling or tenderness to palpation  PSYCH: A+O to person, place, and time; affect appropriate  NEURO: Non-focal, no tremors  SKIN: No rashes    LABS:                        9.4    14.62 )-----------( 358      ( 05 Aug 2020 12:25 )             29.4     08-05    139  |  106  |  12  ----------------------------<  165<H>  3.3<L>   |  16<L>  |  0.63    Ca    7.9<L>      05 Aug 2020 12:25  Phos  1.1     08-05  Mg     1.8     08-05    TPro  5.6<L>  /  Alb  3.1<L>  /  TBili  0.2  /  DBili  x   /  AST  15  /  ALT  10  /  AlkPhos  53  08-05              GI PCR Panel, Stool (collected 03 Aug 2020 14:33)  Source: .Stool Feces  Final Report (03 Aug 2020 18:33):    GI PCR Results: NOT detected    *******Please Note:*******    GI panel PCR evaluates for:    Campylobacter, Plesiomonas shigelloides, Salmonella,    Vibrio, Yersinia enterocolitica, Enteroaggregative    Escherichia coli (EAEC), Enteropathogenic E.coli (EPEC),    Enterotoxigenic E. coli (ETEC) lt/st, Shiga-like    toxin-producing E. coli (STEC) stx1/stx2,    Shigella/ Enteroinvasive E. coli (EIEC), Cryptosporidium,    Cyclospora cayetanensis, Entamoeba histolytica,    Giardia lamblia, Adenovirus F 40/41, Astrovirus,    Norovirus GI/GII, Rotavirus A, Sapovirus        RADIOLOGY & ADDITIONAL TESTS:  No new imaging or tests    COORDINATION OF CARE:  Care Discussed with Consultants/Other Providers [Y/N]:  Prior or Outpatient Records Reviewed [Y/N]:

## 2020-08-06 NOTE — PRE-ANESTHESIA EVALUATION ADULT - MALLAMPATI CLASS
Class III - visualization of the soft palate and the base of the uvula Neck moves well, no rigidity of neck or extremities

## 2020-08-06 NOTE — PROGRESS NOTE ADULT - ATTENDING COMMENTS
Irene Vera   HOspitalist    Pt states to feel better , s/p Flex sig today , cont oral Vanco with IV Flagyl.   Irene Vera   HOspitalist

## 2020-08-06 NOTE — PROGRESS NOTE ADULT - SUBJECTIVE AND OBJECTIVE BOX
Pre-Endoscopy Evaluation      Referring Physician: dr. dunaway                                 Procedure: flexible sigmoidoscopy    Indication for Procedure: severe c-diff colitis      PAST MEDICAL & SURGICAL HISTORY:  Colitis  Hypothyroid  Lipoma of neck  Atrial Tachycardia: SVT on Sotalol  Hypercholesteremia  Hypertension, Essential  Elective surgery: Pilonidal cystectomy  S/P tonsillectomy  S/P D&C (status post dilation and curettage)  H/O: Hysterectomy      PMH of Gastroparesis [ ]  Gastric Surgery [ ]  Gastric Outlet Obstruction [ ]    Allergies:    sulfa drugs (Unknown)    Intolerances:    Latex allergy: [ ] yes [X] no    Medications:MEDICATIONS  (STANDING):  atorvastatin 20 milliGRAM(s) Oral at bedtime  enoxaparin Injectable 40 milliGRAM(s) SubCutaneous daily  levothyroxine 50 MICROGram(s) Oral daily  metroNIDAZOLE  IVPB 500 milliGRAM(s) IV Intermittent every 8 hours  sotalol 40 milliGRAM(s) Oral every 12 hours  vancomycin    Solution 125 milliGRAM(s) Oral every 6 hours    MEDICATIONS  (PRN):  acetaminophen   Tablet .. 650 milliGRAM(s) Oral every 6 hours PRN Temp greater or equal to 38C (100.4F), Mild Pain (1 - 3)      Smoking: [ ] yes  [X] no    AICD/PPM: [ ] yes   [X] no    Pertinent lab data:                        9.4    14.62 )-----------( 358      ( 05 Aug 2020 12:25 )             29.4     08-05    139  |  106  |  12  ----------------------------<  165<H>  3.3<L>   |  16<L>  |  0.63    Ca    7.9<L>      05 Aug 2020 12:25  Phos  1.1     08-05  Mg     1.8     08-05    TPro  5.6<L>  /  Alb  3.1<L>  /  TBili  0.2  /  DBili  x   /  AST  15  /  ALT  10  /  AlkPhos  53  08-05        < from: Transthoracic Echocardiogram (11.23.15 @ 14:05) >    Fractional short: 44 %  EF (Esme): 75 %  ------------------------------------------------------------------------  Observations:  Mitral Valve: Mitral valve prolapse involving the posterior  mitral leaflet. Mild mitral regurgitation.  Aortic Valve/Aorta: Calcified trileaflet aortic valve with  normal opening. Minimal aortic regurgitation.  Normal aortic root size. (Ao: 2.9 cm at the sinuses of  Valsalva).  Left Atrium: Normal left atrium.  LA volume index = 24  cc/m2.  Left Ventricle: Normal left ventricular systolic function.  No segmental wall motion abnormalities. Normal left  ventricular internal dimensions and wall thicknesses. Mild  diastolic dysfunction (Stage I).  Right Heart: Normal right atrium. Normal right ventricular  size and function. Normal tricuspid valve. Mild tricuspid  regurgitation. Normal pulmonic valve.  Pericardium/Pleura: Normal pericardium with no pericardial  effusion.  Hemodynamic: Estimated right atrial pressure is 8 mm Hg.  Estimated right ventricular systolic pressure equals 39 mm  Hg, assuming right atrial pressure equals 8 mm Hg,  consistent with borderline pulmonary hypertension.  ------------------------------------------------------------------------  Conclusions:  1. Mitral valve prolapse involving the posterior mitral  leaflet. Mild mitral regurgitation.  2. Calcified trileaflet aortic valve with normal opening.  Minimal aortic regurgitation.  3. Normal left ventricular internal dimensions and wall  thicknesses.  4. Normal left ventricular systolic function. No segmental  wall motion abnormalities.  5. Mild diastolic dysfunction (Stage I).  6. Normal right ventricular size and function.  7. Normal tricuspid valve. Mild tricuspid regurgitation.  8. Estimated pulmonary artery systolic pressure equals 39  mm Hg, assuming right atrial pressure equals 8 mm Hg,  consistent with borderline pulmonary pressures.  -----------------------------------------------------      Physical Examination:  Daily Height in cm: 152.4 (06 Aug 2020 07:49)    Daily Weight in k.9 (05 Aug 2020 10:11)  Vital Signs Last 24 Hrs  T(C): 36.6 (06 Aug 2020 05:16), Max: 37.4 (05 Aug 2020 21:16)  T(F): 97.9 (06 Aug 2020 05:16), Max: 99.4 (05 Aug 2020 21:16)  HR: 73 (06 Aug 2020 05:16) (73 - 81)  BP: 127/73 (06 Aug 2020 05:16) (120/72 - 156/76)  BP(mean): --  RR: 18 (06 Aug 2020 05:16) (18 - 18)  SpO2: 95% (06 Aug 2020 05:16) (95% - 97%)    Drug Dosing Weight  Height (cm): 152.4 (06 Aug 2020 07:49)  Weight (kg): 50.8 (06 Aug 2020 07:49)  BMI (kg/m2): 21.9 (06 Aug 2020 07:49)  BSA (m2): 1.46 (06 Aug 2020 07:49)      Constitutional: NAD    Neck:  No JVD    Respiratory: CTAB/L    Cardiovascular: S1 and S2    Gastrointestinal: BS+, soft, NT/ND    Extremities: No peripheral edema    Neurological: A/O x 3,    Comments:    The patient is a suitable candidate for the planned procedure unless box checked [ ]  No, explain:

## 2020-08-06 NOTE — PRE-ANESTHESIA EVALUATION ADULT - NSANTHOSAYNRD_GEN_A_CORE
No. TERELL screening performed.  STOP BANG Legend: 0-2 = LOW Risk; 3-4 = INTERMEDIATE Risk; 5-8 = HIGH Risk

## 2020-08-07 NOTE — PROGRESS NOTE ADULT - PROBLEM SELECTOR PLAN 2
-S/p IVF in ED  -Treatment of C. diff as above  -Bcx, GIcx negative  - hold antihypertensives.  -No fever, hypotension for 3 days. WBC improving.   -Lactate 2.4, will continue to trend. -S/p IVF in ED  -Treatment of C. diff as above  -Bcx, GIcx negative  - hold antihypertensives.  -No fever, hypotension for 4 days. WBC improving.   -Lactate 2.4, will continue to trend.

## 2020-08-07 NOTE — CONSULT NOTE ADULT - ASSESSMENT
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     Plan:  - Will need CT chest in addition to A/P for cancer work up  - Will need full colonoscopy, but may not be able to do in setting of C diff  - Colorectal surgery will follow for future surgical planning    [facility] [id number]

## 2020-08-07 NOTE — PROGRESS NOTE ADULT - ASSESSMENT
85 f with HTN, HLD, SVT on sotalol, external hemorrhoids, UC was in remission until a year ago and was started on budesonide and tapered, then was recently admitted for UC flare and bloody diarrhea, was given steroids and again at home had bloody diarrhea, C-diff was checked 7/15 and was positive, she was given flagyl and the bloody diarrhea resolved but she still had diarrhea, now p/w fever and worsening diarrhea  here febrile to 101.8, WBC: 36  abd/pelvis CT with pan colitis  C-diff positive    fever, leukocytosis, sepsis  severe C-diff in the setting of ulcerative colitis, first recurrence or not fully treated first episode (as the diarrhea never resolved)  * s/p flex sig yesterday and membranous colitis was noticed and biopsied, now came back with colonic adenocarcinoma   * c/w PO vanco 125 q 6, started 8/2, now day 7  * c/w IV flagyl 500 q 8  * monitor the WBC and temp curve, now afebrile and WBC improved to 13  * monitor the stool count  * f/u with GI        Ana Barrow MD  Pager [phone]  After 5pm and on weekends call [phone]

## 2020-08-07 NOTE — PROGRESS NOTE ADULT - SUBJECTIVE AND OBJECTIVE BOX
Chief Complaint:  Patient is a 86y old  Female who presents with a chief complaint of 86F sent in by GI for persistent diarrhea (06 Aug 2020 17:03)      Interval Events: No acute events overnight. S/p flex sig yesterday.     Allergies:  sulfa drugs (Unknown)      Hospital Medications:  acetaminophen   Tablet .. 650 milliGRAM(s) Oral every 6 hours PRN  atorvastatin 20 milliGRAM(s) Oral at bedtime  enoxaparin Injectable 40 milliGRAM(s) SubCutaneous daily  levothyroxine 50 MICROGram(s) Oral daily  metroNIDAZOLE  IVPB 500 milliGRAM(s) IV Intermittent every 8 hours  potassium phosphate / sodium phosphate Powder (PHOS-NaK) 2 Packet(s) Oral two times a day  sotalol 40 milliGRAM(s) Oral every 12 hours  vancomycin    Solution 125 milliGRAM(s) Oral every 6 hours      PMHX/PSHX:  Colitis  Atrial fibrillation with rapid ventricular response  Hypothyroid  SVT (supraventricular tachycardia)  Lipoma of neck  Atrial Tachycardia  Hypercholesteremia  Hypertension, Essential  Elective surgery  S/P tonsillectomy  S/P D&C (status post dilation and curettage)  H/O: Hysterectomy      Family history:  Family history of Parkinson's disease (Sibling)  Family history of CVA  Family history of renal failure      ROS:     General:  No weight loss, fevers, chills, night sweats, fatigue   Eyes:  No vision changes  ENT:  No sore throat, pain, runny nose  CV:  No chest pain, palpitations, dizziness   Resp:  No SOB, cough, wheezing  GI:  See HPI  :  No burning with urination, hematuria  Muscle:  No pain, weakness  Neuro:  No weakness/tingling, memory problems  Psych:  No fatigue, insomnia, mood problems, depression  Heme:  No easy bruisability  Skin:  No rash, edema      PHYSICAL EXAM:     GENERAL:  Well developed, no distress  HEENT:  NC/AT,  conjunctivae clear, sclera anicteric  CHEST:  Full & symmetric excursion, no increased effort w/ respirations  HEART:  Regular rhythm & rate  ABDOMEN:  Soft, non-tender, non-distended  EXTREMITIES:  no LE  edema  SKIN:  No rash/erythema/ecchymoses/petechiae/wounds/jaundice  NEURO:  Alert, oriented    Vital Signs:  Vital Signs Last 24 Hrs  T(C): 36.7 (07 Aug 2020 05:59), Max: 37.3 (06 Aug 2020 21:46)  T(F): 98 (07 Aug 2020 05:59), Max: 99.2 (06 Aug 2020 21:46)  HR: 75 (07 Aug 2020 05:59) (70 - 85)  BP: 117/65 (07 Aug 2020 05:59) (117/65 - 150/81)  BP(mean): --  RR: 18 (07 Aug 2020 05:59) (18 - 18)  SpO2: 95% (07 Aug 2020 05:59) (95% - 98%)  Daily Height in cm: 152.4 (06 Aug 2020 09:23)    Daily     LABS:                        9.6    13.81 )-----------( 384      ( 06 Aug 2020 15:57 )             30.6     08-06    140  |  106  |  9   ----------------------------<  102<H>  3.5   |  19<L>  |  0.67    Ca    8.5      06 Aug 2020 15:57  Phos  2.2     08-06  Mg     1.6     08-06    TPro  5.9<L>  /  Alb  3.2<L>  /  TBili  0.4  /  DBili  x   /  AST  16  /  ALT  11  /  AlkPhos  48  08-06    LIVER FUNCTIONS - ( 06 Aug 2020 15:57 )  Alb: 3.2 g/dL / Pro: 5.9 g/dL / ALK PHOS: 48 U/L / ALT: 11 U/L / AST: 16 U/L / GGT: x                   Imaging:    < from: Flexible Sigmoidoscopy (08.06.20 @ 09:58) >  Findings:       The perianal exam findings include non-thrombosed external hemorrhoids.       Diffuse pseudomembranes were found in the sigmoid colon. Biopsies were taken with a cold        forceps for histology.    Inflammation characterized by altered vascularity, congestion (edema), erosions, erythema and        friability was found in a continuous and circumferential pattern in the sigmoid colon, 30cm        from anus. This was severe in severity. Biopsieswere taken with a cold forceps for        histology. This was most severe from 20-30cm from anus.                                                                                                        Impression:          - Non-thrombosed external hemorrhoids found on perianal exam.                       - Pseudomembranous enterocolitis, consistent with diagnosed c diff.                       - Inflammation was found in the sigmoid colon secondary to ulcerative                        colitis. Biopsied.    < end of copied text > Chief Complaint:  Patient is a 86y old  Female who presents with a chief complaint of 86F sent in by GI for persistent diarrhea (06 Aug 2020 17:03)      Interval Events: No acute events overnight. S/p flex sig yesterday. Had 3BM yesterday and 2 this AM. Remains watery, non-bloody.     Allergies:  sulfa drugs (Unknown)      Hospital Medications:  acetaminophen   Tablet .. 650 milliGRAM(s) Oral every 6 hours PRN  atorvastatin 20 milliGRAM(s) Oral at bedtime  enoxaparin Injectable 40 milliGRAM(s) SubCutaneous daily  levothyroxine 50 MICROGram(s) Oral daily  metroNIDAZOLE  IVPB 500 milliGRAM(s) IV Intermittent every 8 hours  potassium phosphate / sodium phosphate Powder (PHOS-NaK) 2 Packet(s) Oral two times a day  sotalol 40 milliGRAM(s) Oral every 12 hours  vancomycin    Solution 125 milliGRAM(s) Oral every 6 hours      PMHX/PSHX:  Colitis  Atrial fibrillation with rapid ventricular response  Hypothyroid  SVT (supraventricular tachycardia)  Lipoma of neck  Atrial Tachycardia  Hypercholesteremia  Hypertension, Essential  Elective surgery  S/P tonsillectomy  S/P D&C (status post dilation and curettage)  H/O: Hysterectomy      Family history:  Family history of Parkinson's disease (Sibling)  Family history of CVA  Family history of renal failure      ROS:     General:  No weight loss, fevers, chills, night sweats, fatigue   Eyes:  No vision changes  ENT:  No sore throat, pain, runny nose  CV:  No chest pain, palpitations, dizziness   Resp:  No SOB, cough, wheezing  GI:  See HPI  :  No burning with urination, hematuria  Muscle:  No pain, weakness  Neuro:  No weakness/tingling, memory problems  Psych:  No fatigue, insomnia, mood problems, depression  Heme:  No easy bruisability  Skin:  No rash, edema      PHYSICAL EXAM:     GENERAL:  Well developed, no distress  HEENT:  NC/AT,  conjunctivae clear, sclera anicteric  CHEST:  Full & symmetric excursion, no increased effort w/ respirations  HEART:  Regular rhythm & rate  ABDOMEN:  Soft, non-tender, non-distended  EXTREMITIES:  no LE  edema  SKIN:  No rash/erythema/ecchymoses/petechiae/wounds/jaundice  NEURO:  Alert, oriented    Vital Signs:  Vital Signs Last 24 Hrs  T(C): 36.7 (07 Aug 2020 05:59), Max: 37.3 (06 Aug 2020 21:46)  T(F): 98 (07 Aug 2020 05:59), Max: 99.2 (06 Aug 2020 21:46)  HR: 75 (07 Aug 2020 05:59) (70 - 85)  BP: 117/65 (07 Aug 2020 05:59) (117/65 - 150/81)  BP(mean): --  RR: 18 (07 Aug 2020 05:59) (18 - 18)  SpO2: 95% (07 Aug 2020 05:59) (95% - 98%)  Daily Height in cm: 152.4 (06 Aug 2020 09:23)    Daily     LABS:                        9.6    13.81 )-----------( 384      ( 06 Aug 2020 15:57 )             30.6     08-06    140  |  106  |  9   ----------------------------<  102<H>  3.5   |  19<L>  |  0.67    Ca    8.5      06 Aug 2020 15:57  Phos  2.2     08-06  Mg     1.6     08-06    TPro  5.9<L>  /  Alb  3.2<L>  /  TBili  0.4  /  DBili  x   /  AST  16  /  ALT  11  /  AlkPhos  48  08-06    LIVER FUNCTIONS - ( 06 Aug 2020 15:57 )  Alb: 3.2 g/dL / Pro: 5.9 g/dL / ALK PHOS: 48 U/L / ALT: 11 U/L / AST: 16 U/L / GGT: x                   Imaging:    < from: Flexible Sigmoidoscopy (08.06.20 @ 09:58) >  Findings:       The perianal exam findings include non-thrombosed external hemorrhoids.       Diffuse pseudomembranes were found in the sigmoid colon. Biopsies were taken with a cold        forceps for histology.    Inflammation characterized by altered vascularity, congestion (edema), erosions, erythema and        friability was found in a continuous and circumferential pattern in the sigmoid colon, 30cm        from anus. This was severe in severity. Biopsieswere taken with a cold forceps for        histology. This was most severe from 20-30cm from anus.                                                                                                        Impression:          - Non-thrombosed external hemorrhoids found on perianal exam.                       - Pseudomembranous enterocolitis, consistent with diagnosed c diff.                       - Inflammation was found in the sigmoid colon secondary to ulcerative                        colitis. Biopsied.    < end of copied text >

## 2020-08-07 NOTE — PROGRESS NOTE ADULT - ATTENDING COMMENTS
Improving C diff  Ulcerative colitis  Continue PO  Continue vanco and flagyl  Pending colon biopsies

## 2020-08-07 NOTE — CONSULT NOTE ADULT - SUBJECTIVE AND OBJECTIVE BOX
SURGERY CONSULT NOTE     HPI: 86 year old female with history of US, last colonoscopy in 2015, multiple recent admissions for UC flares and treated medically, hypothyroidism, SVT, admitted for 2 months of bloody diarrhea and found to be C diff positive. She was treated outpatient and continued with bloody diarrhea and developed one subjective fever and came into the hospital. She denies current fevers, abdominal pain or bloating. She was admitted and treatment began for C diff which has progressively improved on IV flagyl and po vancomycin. A flexible sigmoidoscopy was completed showing pseudomembranes c/w C. Diff and inflammation of sigmoid colon and rectum c/w UC without concern for cancer at the time. Biopsies taken at the time showed sigmoid adenocarcinoma.     The patient reports a 13 pound weight loss since March. She does have blood in the stool associated with her diarrhea. She reports her last colonoscopy showed no cancer and she has not had one since 2015. CT showed diffuse colonic inflammation in the setting of c diff. No CT chest.     PMHx: Colitis  Atrial fibrillation with rapid ventricular response  Hypothyroid  SVT (supraventricular tachycardia)  Lipoma of neck  Atrial Tachycardia  Hypercholesteremia  Hypertension, Essential    PSHx: Elective surgery  S/P tonsillectomy  S/P D&C (status post dilation and curettage)  H/O: Hysterectomy    Medications (inpatient): atorvastatin 20 milliGRAM(s) Oral at bedtime  enoxaparin Injectable 40 milliGRAM(s) SubCutaneous daily  levothyroxine 50 MICROGram(s) Oral daily  metroNIDAZOLE  IVPB 500 milliGRAM(s) IV Intermittent every 8 hours  potassium phosphate / sodium phosphate Powder (PHOS-NaK) 2 Packet(s) Oral two times a day  sotalol 40 milliGRAM(s) Oral every 12 hours  vancomycin    Solution 125 milliGRAM(s) Oral every 6 hours    Medications (PRN):acetaminophen   Tablet .. 650 milliGRAM(s) Oral every 6 hours PRN    Allergies: sulfa drugs (Unknown)  (Intolerances: )  Social Hx:   Family Hx: Family history of Parkinson's disease (Sibling): HTN, DM  Family history of CVA: DM  Family history of renal failure: HTN      Physical Exam  T(C): 37.3  HR: 75 (75 - 85)  BP: 117/70 (117/65 - 128/75)  RR: 18 (18 - 18)  SpO2: 96% (95% - 96%)  Tmax: T(C): , Max: 37.3 (08-06-20 @ 21:46)    General: well developed, well nourished, NAD  Respiratory: airway patent, respirations unlabored  CVS: regular rate and rhythm  Abdomen: soft, nontender to palpation, mildly distended   Extremities: no edema, sensation and movement grossly intact  Skin: warm, dry, appropriate color    Labs:                        9.6    13.41 )-----------( 385      ( 07 Aug 2020 10:36 )             30.8       08-07    140  |  104  |  16  ----------------------------<  222<H>  3.4<L>   |  19<L>  |  0.73    Ca    8.1<L>      07 Aug 2020 10:36  Phos  2.6     08-07  Mg     1.6     08-07    TPro  5.9<L>  /  Alb  3.2<L>  /  TBili  0.4  /  DBili  x   /  AST  16  /  ALT  11  /  AlkPhos  48  08-06        Imaging and other studies:    < from: CT Abdomen and Pelvis w/ IV Cont (08.02.20 @ 21:28) >  FINDINGS:  LOWER CHEST: Bibasilar linear atelectasis.    LIVER: Right hepatic subcentimeter hypodense lesion too small to characterize.  BILE DUCTS: Normal caliber.  GALLBLADDER: Within normal limits.  SPLEEN: Within normal limits.  PANCREAS: Within normal limits.  ADRENALS: Within normal limits.  KIDNEYS/URETERS: Left renal cyst. Bilateral renal subcentimeter hypodense lesions which are too small for accurate characterization. 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    BLADDER: Within normal limits.  REPRODUCTIVE ORGANS: Hysterectomy. No adnexal masses.    BOWEL: Extensive mural thickening extending from the cecum to the rectum with associated inflammatory change. Colonic diverticulosis. No bowel obstruction. Appendix not visualized. No secondary signs of appendicitis.  PERITONEUM: No ascites. Small mesenteric lymph nodes, predominantly in the left lower quadrant. No drainable fluid collection.  VESSELS: Within normal limits.  RETROPERITONEUM/LYMPH NODES: No lymphadenopathy.  ABDOMINAL WALL: Within normal limits.  BONES: Total left hip arthroplasty. L5-S1 degenerative changes.    IMPRESSION:  Pancolitis which could be secondary to the patient's known diagnosis of ulcerative colitis or due to an infectious etiology.    < end of copied text >    Surgical Pathology Report (08.06.20 @ 11:20)    Surgical Pathology Report:   ACCESSION No:  [id number]    WARD DICKSON                    2        Surgical Final Report          Final Diagnosis  1. Sigmoid colon, #1, biopsy:  - Colonic mucosa with hyperplastic changes and focal  cryptitis    2. Sigmoid colon, #2, biopsy:  - Colonic adenocarcinoma, moderately differentiated  See comment    3. Rectum, biopsy  - Colonic mucosa with focal cryptitis    Verified by: YANG GRANADOS MD  (Electronic Signature)  Reported on: 08/07/20 12:07 EDT, [address] [place] [address]  Phone: [phone]   Fax: [phone]  _________________________________________________________________    Comment  This case was reviewed as an intradepartmental consultation with  Dr. Krishnan,  who concurs with the diagnosis.  Report conveyed to Dr. Chery  on 08/07/20.  Case reported to Tumor Registry.    Clinical History  colitis    Specimen(s) Submitted  1     Biopsy sigmoid colon #1  2     Biopsy sigmoid colon #2  3     Biopsy rectum    Gross Description  1.   The specimen is received in formalin and the specimen  container is labeled: Biopsy sigmoid colon.  It consists of four  fragments of tan soft tissue ranging from 0.2 cm to 0.5 cm in  greatest dimension.  Entirely submitted.  One cassette.    2.   The specimen is received in formalin and the specimen  container is labeled: Biopsy sigmoid colon #2.  It consists of  six fragments of tan soft tissue ranging from 0.2 cm to 0.3 cm in  greatest dimension.  Entirely submitted.  One cassette.    3.  The specimen is received in formalin and the specimen  container is labeled: Biopsy rectum.  It consists of two  fragments of tan soft tissue each measuring 0.2 cm in greatest  dimension.  Entirely submitted.  One cassette.            ANKUR DICKSON                    2        Surgical Final Report            In addition to other data that may appear on the specimen  containers, all labels have been inspected to confirm the  presence of the patient's name and date of birth.    LINDA Wong ASCP 08/06/2020 17:14      < from: Flexible Sigmoidoscopy (08.06.20 @ 09:58) >  Findings:       The perianal exam findings include non-thrombosed external hemorrhoids.       Diffuse pseudomembranes were found in the sigmoid colon. Biopsies were taken with a cold        forceps for histology.    Inflammation characterized by altered vascularity, congestion (edema), erosions, erythema and        friability was found in a continuous and circumferential pattern in the sigmoid colon, 30cm        from anus. This was severe in severity. Biopsieswere taken with a cold forceps for        histology. This was most severe from 20-30cm from anus.                                                                                                        Impression:          - Non-thrombosed external hemorrhoids found on perianal exam.                       - Pseudomembranous enterocolitis, consistent with diagnosed c diff.                       - Inflammation was found in the sigmoid colon secondary to ulcerative                        colitis. Biopsied.  Recommendation:      - Return patient to hospital plata for ongoing care.                       - Await pathology results.                       - Continue with PO flagyl and IV vancomycin.                                                    < end of copied text >

## 2020-08-07 NOTE — PROGRESS NOTE ADULT - ASSESSMENT
86F PMH ulcerative colitis (on asacol 1600 TID), external hemorrhoids, SVT on sotolol, hypothyroid, HTN, HLD, admitted 5/15 - 5/20/20 for UC flare treated with IV steroids and d/c on prednisone, now presenting c diff colitis vs UC flare.       Impression:  #C diff colitis - severe disease, WBC 30k, fever, pancolitis on imaging; normal creatinine.   # Ulcerative colitis - recently on steroids for flare in May, now on PO mesalamine.    #SVT on sotalol        Recommendation:  - c/w PO vanc and IV flagyl (d. 5)  - monitor BM frequency and consistency  - hep serologies and quant noted   - s/p flex sig on 8/6 with severe inflammation and pseudomembranes  - f/u path  - once c diff clears will need to discuss next steps for UC treatment as patient does not have drug insurance          Cheryl Stnaley PGY-4  Gastroenterology Fellow  Pager #[phone] or [phone]  Please call anRight90ing service for on-call GI fellow ([phone]) after 5pm and before 8am, and on weekends.

## 2020-08-07 NOTE — PROGRESS NOTE ADULT - SUBJECTIVE AND OBJECTIVE BOX
Follow Up:  C-diff    Interval History: s/p flex sig yesterday, feels like diarrhea is worse after the procedure    ROS:      All other systems negative    Constitutional: no fever, no chills  Cardiovascular:  no chest pain, no palpitation  Respiratory:  no SOB, no cough  GI:  no abd pain, no vomiting, + diarrhea  urinary: no dysuria, no hematuria, no flank pain  skin:  no rash  neurology:  no headache, no seizure        Allergies  sulfa drugs (Unknown)        ANTIMICROBIALS:  metroNIDAZOLE  IVPB 500 every 8 hours  vancomycin    Solution 125 every 6 hours      OTHER MEDS:  acetaminophen   Tablet .. 650 milliGRAM(s) Oral every 6 hours PRN  atorvastatin 20 milliGRAM(s) Oral at bedtime  enoxaparin Injectable 40 milliGRAM(s) SubCutaneous daily  levothyroxine 50 MICROGram(s) Oral daily  potassium phosphate / sodium phosphate Powder (PHOS-NaK) 2 Packet(s) Oral two times a day  sotalol 40 milliGRAM(s) Oral every 12 hours      Vital Signs Last 24 Hrs  T(C): 37.3 (07 Aug 2020 14:46), Max: 37.3 (06 Aug 2020 21:46)  T(F): 99.1 (07 Aug 2020 14:46), Max: 99.2 (06 Aug 2020 21:46)  HR: 75 (07 Aug 2020 05:59) (75 - 85)  BP: 117/70 (07 Aug 2020 14:46) (117/65 - 128/75)  BP(mean): --  RR: 18 (07 Aug 2020 14:46) (18 - 18)  SpO2: 96% (07 Aug 2020 14:46) (95% - 96%)    Physical Exam:  General:    NAD,  non toxic, sitting on a chair  Cardio:     regular S1, S2,  no murmur  Respiratory:    clear b/l,    no wheezing  abd:     soft,   BS +,   no tenderness  :   no CVAT,  no suprapubic tenderness,   no  walter  Musculoskeletal:   no joint swelling  vascular: no phlebitis  Skin:    no rash  Neurologic:     no focal deficit  psych: normal affect                          9.6    13.41 )-----------( 385      ( 07 Aug 2020 10:36 )             30.8       08-07    140  |  104  |  16  ----------------------------<  222<H>  3.4<L>   |  19<L>  |  0.73    Ca    8.1<L>      07 Aug 2020 10:36  Phos  2.6     08-07  Mg     1.6     08-07    TPro  5.9<L>  /  Alb  3.2<L>  /  TBili  0.4  /  DBili  x   /  AST  16  /  ALT  11  /  AlkPhos  48  08-06          MICROBIOLOGY:  v  .Stool Feces  08-03-20   GI PCR Results: NOT detected      .Blood Blood-Peripheral  08-02-20   No growth to date.  --  --            Clostridium difficile [facility] Toxins A&amp;B, EIA:   Positive (08-02-20 @ 20:13)  Clostridium difficile [facility] Interpretation: Positive for toxigenic C. Difficile.      RADIOLOGY:  Images below independently visualized and reviewed personally, findings as below  < from: Xray Hip w/ Pelvis 2 or 3 Views, Right (08.05.20 @ 09:47) >  IMPRESSION:  No acute displaced fracture or dislocation is seen in the right hip. Mild arthritic changes of the right hip. Left total hip arthroplasty, femoral component only partially visualized. No diastasis of the pubic symphysis or bilateral sacroiliac joints. Degenerative changes in the lower lumbar spine.      < from: CT Abdomen and Pelvis w/ IV Cont (08.02.20 @ 21:28) >  IMPRESSION:  Pancolitis which could be secondary to the patient's known diagnosis of ulcerative colitis or due to an infectious etiology.      < end of copied text >

## 2020-08-07 NOTE — PROGRESS NOTE ADULT - SUBJECTIVE AND OBJECTIVE BOX
PROGRESS NOTE:   Authored by Jose D Gonzales MD, PGY1 [facility] Pager [phone] [facility] pager [phone]    Patient is a 86y old  Female who presents with a chief complaint of c diff (07 Aug 2020 07:37)      SUBJECTIVE / OVERNIGHT EVENTS:     REVIEW OF SYSTEMS:    CONSTITUTIONAL: No weakness, fevers or chills  EYES/ENT: No visual changes;  No vertigo or throat pain   NECK: No pain or stiffness  RESPIRATORY: No cough, wheezing, hemoptysis; No shortness of breath  CARDIOVASCULAR: No chest pain or palpitations  GASTROINTESTINAL: No abdominal or epigastric pain. No nausea, vomiting, or hematemesis; No diarrhea or constipation. No melena or hematochezia.  GENITOURINARY: No dysuria, frequency or hematuria  NEUROLOGICAL: No numbness or weakness  SKIN: No itching, rashes    MEDICATIONS  (STANDING):  atorvastatin 20 milliGRAM(s) Oral at bedtime  enoxaparin Injectable 40 milliGRAM(s) SubCutaneous daily  levothyroxine 50 MICROGram(s) Oral daily  metroNIDAZOLE  IVPB 500 milliGRAM(s) IV Intermittent every 8 hours  potassium phosphate / sodium phosphate Powder (PHOS-NaK) 2 Packet(s) Oral two times a day  sotalol 40 milliGRAM(s) Oral every 12 hours  vancomycin    Solution 125 milliGRAM(s) Oral every 6 hours    MEDICATIONS  (PRN):  acetaminophen   Tablet .. 650 milliGRAM(s) Oral every 6 hours PRN Temp greater or equal to 38C (100.4F), Mild Pain (1 - 3)      CAPILLARY BLOOD GLUCOSE        I&O's Summary      PHYSICAL EXAM:  Vital Signs Last 24 Hrs  T(C): 36.7 (07 Aug 2020 05:59), Max: 37.3 (06 Aug 2020 21:46)  T(F): 98 (07 Aug 2020 05:59), Max: 99.2 (06 Aug 2020 21:46)  HR: 75 (07 Aug 2020 05:59) (70 - 85)  BP: 117/65 (07 Aug 2020 05:59) (117/65 - 150/81)  BP(mean): --  RR: 18 (07 Aug 2020 05:59) (18 - 18)  SpO2: 95% (07 Aug 2020 05:59) (95% - 98%)    CONSTITUTIONAL: NAD, well-developed  RESPIRATORY: Normal respiratory effort; lungs are clear to auscultation bilaterally  CARDIOVASCULAR: Regular rate and rhythm, normal S1 and S2, no murmur/rub/gallop; No lower extremity edema; Peripheral pulses are 2+ bilaterally  ABDOMEN: Nontender to palpation, normoactive bowel sounds, no rebound/guarding; No hepatosplenomegaly  MUSCLOSKELETAL: no clubbing or cyanosis of digits; no joint swelling or tenderness to palpation  PSYCH: A+O to person, place, and time; affect appropriate  NEURO: Non-focal, no tremors  SKIN: No rashes    LABS:                        9.6    13.81 )-----------( 384      ( 06 Aug 2020 15:57 )             30.6     08-06    140  |  106  |  9   ----------------------------<  102<H>  3.5   |  19<L>  |  0.67    Ca    8.5      06 Aug 2020 15:57  Phos  2.2     08-06  Mg     1.6     08-06    TPro  5.9<L>  /  Alb  3.2<L>  /  TBili  0.4  /  DBili  x   /  AST  16  /  ALT  11  /  AlkPhos  48  08-06                RADIOLOGY & ADDITIONAL TESTS:  No new imaging or tests    COORDINATION OF CARE:  Care Discussed with Consultants/Other Providers [Y/N]:  Prior or Outpatient Records Reviewed [Y/N]: PROGRESS NOTE:   Authored by Jose D Gonzales MD, PGY1 [facility] Pager [phone] [facility] pager [phone]    Patient is a 86y old  Female who presents with a chief complaint of c diff (07 Aug 2020 07:37)      SUBJECTIVE / OVERNIGHT EVENTS: No fever , pos improved diarrhea     REVIEW OF SYSTEMS:    CONSTITUTIONAL: No weakness, fevers or chills  EYES/ENT: No visual changes;  No vertigo or throat pain   NECK: No pain or stiffness  RESPIRATORY: No cough, wheezing, hemoptysis; No shortness of breath  CARDIOVASCULAR: No chest pain or palpitations  GASTROINTESTINAL: No abdominal or epigastric pain. No nausea, vomiting, or hematemesis; No diarrhea or constipation. No melena or hematochezia.  GENITOURINARY: No dysuria, frequency or hematuria  NEUROLOGICAL: No numbness or weakness  SKIN: No itching, rashes    MEDICATIONS  (STANDING):  atorvastatin 20 milliGRAM(s) Oral at bedtime  enoxaparin Injectable 40 milliGRAM(s) SubCutaneous daily  levothyroxine 50 MICROGram(s) Oral daily  metroNIDAZOLE  IVPB 500 milliGRAM(s) IV Intermittent every 8 hours  potassium phosphate / sodium phosphate Powder (PHOS-NaK) 2 Packet(s) Oral two times a day  sotalol 40 milliGRAM(s) Oral every 12 hours  vancomycin    Solution 125 milliGRAM(s) Oral every 6 hours    MEDICATIONS  (PRN):  acetaminophen   Tablet .. 650 milliGRAM(s) Oral every 6 hours PRN Temp greater or equal to 38C (100.4F), Mild Pain (1 - 3)      CAPILLARY BLOOD GLUCOSE        I&O's Summary      PHYSICAL EXAM:  Vital Signs Last 24 Hrs  T(C): 36.7 (07 Aug 2020 05:59), Max: 37.3 (06 Aug 2020 21:46)  T(F): 98 (07 Aug 2020 05:59), Max: 99.2 (06 Aug 2020 21:46)  HR: 75 (07 Aug 2020 05:59) (70 - 85)  BP: 117/65 (07 Aug 2020 05:59) (117/65 - 150/81)  BP(mean): --  RR: 18 (07 Aug 2020 05:59) (18 - 18)  SpO2: 95% (07 Aug 2020 05:59) (95% - 98%)    CONSTITUTIONAL: NAD, well-developed  RESPIRATORY: Normal respiratory effort; lungs are clear to auscultation bilaterally  CARDIOVASCULAR: Regular rate and rhythm, normal S1 and S2, no murmur/rub/gallop; No lower extremity edema; Peripheral pulses are 2+ bilaterally  ABDOMEN: Nontender to palpation, normoactive bowel sounds, no rebound/guarding; No hepatosplenomegaly  MUSCLOSKELETAL: no clubbing or cyanosis of digits; no joint swelling or tenderness to palpation  PSYCH: A+O to person, place, and time; affect appropriate  NEURO: Non-focal, no tremors  SKIN: No rashes    LABS:                        9.6    13.81 )-----------( 384      ( 06 Aug 2020 15:57 )             30.6     08-06    140  |  106  |  9   ----------------------------<  102<H>  3.5   |  19<L>  |  0.67    Ca    8.5      06 Aug 2020 15:57  Phos  2.2     08-06  Mg     1.6     08-06    TPro  5.9<L>  /  Alb  3.2<L>  /  TBili  0.4  /  DBili  x   /  AST  16  /  ALT  11  /  AlkPhos  48  08-06                RADIOLOGY & ADDITIONAL TESTS:  No new imaging or tests    COORDINATION OF CARE:  Care Discussed with Consultants/Other Providers [Y/N]:  Prior or Outpatient Records Reviewed [Y/N]:

## 2020-08-07 NOTE — PROGRESS NOTE ADULT - PROBLEM SELECTOR PLAN 3
Patient unsure exactly when this happened. She notes hitting side on wall when stumbling in rush to go to bathroom and also sitting awkwardly when getting on commode.   -Nontender to palpation without noticeable swelling, but pain with hip flexion or when changing positions. Overall pain improved today, relieved by tylenol.  -Negative hip Xray, no acute fractures.   -Fall precautions, bedpan in addition to bedside commode  -Heat packs.   -Tylenol PRN for pain.

## 2020-08-07 NOTE — PROGRESS NOTE ADULT - PROBLEM SELECTOR PLAN 1
Unclear if this represents treatment failure or recurrence, given symptoms did not resolve fully after initial course of flagyl.  -Diarrhea improving with less cramping, abdominal pain, no blood in stool.   -Started on vanco PO 125mg q6h and flagyl IV 500mg q8h  -Given complicated C.Diff (failure of treatment, high white count, pancolitis, WBC >30 ect), will continue patient on PO vancomycin plus IV flagyl.   -CT abd/pelvis showing pancolitis without lymphadenopathy.   -WBC improving 36->30->21.8->14.6  -Trend BMP, replete lytes PRN. Will f/u CBC, lactate, monitor for toxic signs.   -Advanced diet to full with low residual. Patient tolerating well.    -No concern for complications including toxic megacolon given no distension or peritoneal signs on exam, lactate wnl  -Patient to go for flexible sigmoidoscopy today to evaluate extensiveness of ulcerative colitis as a contributing factor to patient's diarrhea. Will f/u on results with GI. Unclear if this represents treatment failure or recurrence, given symptoms did not resolve fully after initial course of flagyl.  -Diarrhea improving with less cramping, abdominal pain, no blood in stool.   -Started on vanco PO 125mg q6h and flagyl IV 500mg q8h  -Given complicated C.Diff (failure of treatment, high white count, pancolitis, WBC >30 ect), will continue patient on PO vancomycin plus IV flagyl.   -CT abd/pelvis showing pancolitis without lymphadenopathy.   -WBC improving 36->13.8  -Trend BMP, replete lytes PRN. Will f/u CBC, lactate, monitor for toxic signs.   -Advanced diet to full with low residual. Patient tolerating well.    -No concern for complications including toxic megacolon given no distension or peritoneal signs on exam, lactate wnl  -Patient had Flex Sig yesterday showing pseudomembranes c/w C. Diff and inflammation of sigmoid colon and rectum c/w UC. As per Gi, plan is to continue with abx for C. Diff and later discuss potential treatments for patient's UC. Will f/u on further GI recs. Unclear if this represents treatment failure or recurrence, given symptoms did not resolve fully after initial course of flagyl.  -Diarrhea improving with less cramping, abdominal pain, no blood in stool.   -Started on vanco PO 125mg q6h and flagyl IV 500mg q8h  -Given complicated C.Diff (failure of treatment, high white count, pancolitis, WBC >30 ect), will continue patient on PO vancomycin plus IV flagyl.   -CT abd/pelvis showing pancolitis without lymphadenopathy.   -WBC improving 36->13.4  -Trend BMP, replete lytes PRN. Will f/u CBC, lactate, monitor for toxic signs.   -Advanced diet to full with low residual. Patient tolerating well.    -No concern for complications including toxic megacolon given no distension or peritoneal signs on exam, lactate wnl  -Patient had Flex Sig yesterday showing pseudomembranes c/w C. Diff and inflammation of sigmoid colon and rectum c/w UC. As per Gi, plan is to continue with abx for C. Diff and later discuss potential treatments for patient's UC. Will f/u on further GI recs.

## 2020-08-07 NOTE — PROGRESS NOTE ADULT - ATTENDING COMMENTS
85 f with HTN, HLD, SVT on sotalol, external hemorrhoids, UC was in remission until a year ago and was started on budesonide and tapered, then was recently admitted for UC flare and bloody diarrhea, was given steroids and again at home had bloody diarrhea, C-diff was checked 7/15 and was positive, she was given flagyl and the bloody diarrhea resolved but she still had diarrhea, now p/w fever and worsening diarrhea.  Patient was found severe C diff colitis and was started on Oral Vanco and IV Metronidazole with good response.  She has Flex sigmoidoscopy done on 8/6 where she was found to have pseudomembranous enterocolitis and US and path done showed colonic Adenocarcinoma moderately differentiated.  GI has relayed the diagnosis to the patient who is distraught and I have spoken with patient who states that she has discussed the diagnosis with her son who is a pathologist who will come to see her and discuss with the surgeon/GI about options.      Irene Vera   HOspitalist   [phone]

## 2020-08-08 NOTE — PROGRESS NOTE ADULT - PROBLEM SELECTOR PLAN 4
Unlikely that this presentation represents UC flare as it is quite different from patient's previous flare - no abdominal pain, minimal bloody BM  -Outpatient follow-up to determine further treatment for UC. Patient unsure exactly when this happened. She notes hitting side on wall when stumbling in rush to go to bathroom and also sitting awkwardly when getting on commode.   -Nontender to palpation without noticeable swelling, but pain with hip flexion or when changing positions. Overall pain improved today, relieved by tylenol.  -Negative hip Xray, no acute fractures.   -Fall precautions, bedpan in addition to bedside commode  -Heat packs and Tylenol PRN

## 2020-08-08 NOTE — PROGRESS NOTE ADULT - ATTENDING COMMENTS
85F w/ PMHx of HTN, HLD, SVT on sotalol, hemorrhoids, and UC w/ flare over the last year. Now s/p multiple courses of oral budesonide and found to have C diff infection on IV flagyl and oral vancomycin with slow improvement in symptoms. She underwent sigmoidoscopy on 8/6 and pathology was positive for adenocarcinoma. Recent CT AP revealed no evidence of underlying mass or metastasis. Will consider CT Chest to complete staging. At this juncture, patient remains averse to surgical intervention requiring an ostomy. Her son is a pathologist and she is requesting her slides be sent by review. For now, will proceed with obtaining surgical opinion. Will also consider medical oncology consultation to provide patient with all of her treatment options in the event she declines surgery. Appreciate colorectal surgery and GI input.    Axel Cabrera MD  Division of Hospital Medicine  Pager: [phone]  Office: [phone]

## 2020-08-08 NOTE — PROGRESS NOTE ADULT - ASSESSMENT
86F PMH ulcerative colitis (on asacol 1600 TID), external hemorrhoids, SVT on sotolol, hypothyroid, HTN, HLD, admitted 5/15 - 5/20/20 for UC flare treated with IV steroids and d/c on prednisone, now presenting with C. 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       Impression:  #C. diff colitis - severe disease, initial WBC 30k with fever and pancolitis on imaging; normal creatinine. Severe inflammation and pseudomembranes  on flexible sigmoidoscopy from 8/6/20.  WBC and stool frequency now improving on PO vancomycin and IV metronidazole.   #Ulcerative colitis, with acute exacerbation due to C. diff colitis - recently on steroids for acute flare in May 2020, now on PO mesalamine.    #Adenocarcinoma of sigmoid colon - demonstrated on biopsy from flexible sigmoidscopy this admission   #Low grade fever - suspect due to C. diff colitis vs. alternate infection  #SVT on sotalol      Recommendation:  - send blood culture, UA, urine culture for low grade fever on day 6 of antibiotics   - continue PO vanc and IV flagyl (day 6)  - monitor BM frequency and consistency  - hep serologies and quant noted   - s/p flex sig on 8/6 with severe inflammation and pseudomembranes  - will discuss timing ofa  repeat colonoscopy for further surgical planning  - continue goals of care discussion regarding surgical intervention for colon cancer/severe UC       Erin Fonseca PGY-5  Gastroenterology Fellow  Pager #[phone]  E-mail [email or web address]  Call [phone] to speak to answering service for on-call GI fellow 5pm-7am and weekends. 86F PMH ulcerative colitis (on asacol 1600 TID), external hemorrhoids, SVT on sotolol, hypothyroid, HTN, HLD, admitted 5/15 - 5/20/20 for UC flare treated with IV steroids and d/c on prednisone, now presenting with C. 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       Impression:  #C. diff colitis - severe disease, initial WBC 30k with fever and pancolitis on imaging; normal creatinine. Severe inflammation and pseudomembranes  on flexible sigmoidoscopy from 8/6/20.  WBC and stool frequency now improving on PO vancomycin and IV metronidazole.   #Ulcerative colitis, with acute exacerbation due to C. diff colitis - recently on steroids for acute flare in May 2020, now on PO mesalamine.    #Adenocarcinoma of sigmoid colon - demonstrated on biopsy from flexible sigmoidscopy this admission   #Low grade fever - suspect due to C. diff colitis vs. alternate infection  #SVT on sotalol      Recommendation:  - please consult Colorectal Surgery (specifically Dr. Baron group) to discuss surgical options with patient   - can start cholestyramine 4g once daily to help reduce stool frequency/bind C. diff toxin - please do not dose with oral vancomycin to prevent binding/decreased antibiotic effect   - consider blood culture, UA, urine culture for low grade fever on antibiotics  - continue PO vanc and IV flagyl (day 6)  - monitor BM frequency and consistency  - will discuss timing of a repeat colonoscopy for further surgical planning  - continue goals of care discussion regarding surgical intervention for colon cancer/severe UC       Erin Fonseca PGY-5  Gastroenterology Fellow  Pager #[phone]  E-mail [email or web address]  Call [phone] to speak to answering service for on-call GI fellow 5pm-7am and weekends.

## 2020-08-08 NOTE — PROGRESS NOTE ADULT - PROBLEM SELECTOR PLAN 2
-S/p IVF in ED  -Treatment of C. diff as above  -Bcx, GIcx negative  - hold antihypertensives.  -No fever, hypotension for 4 days. WBC improving.   -Lactate 2.4, will continue to trend. Unclear if this represents treatment failure or recurrence, given symptoms did not resolve fully after initial course of flagyl.  -Diarrhea improving with less cramping, abdominal pain, no blood in stool.   -Started on vanco PO 125mg q6h and flagyl IV 500mg q8h  -Given complicated C.Diff (failure of treatment, high white count, pancolitis, WBC >30 ect), will continue patient on PO vancomycin plus IV flagyl.   -CT abd/pelvis showing pancolitis without lymphadenopathy.   -WBC improving 36->13.4  -Trend BMP, replete lytes PRN. Will f/u CBC, lactate, monitor for toxic signs.   -Advanced diet to full with low residual. Patient tolerating well.    -No concern for complications including toxic megacolon given no distension or peritoneal signs on exam, lactate wnl  -Patient had Flex Sig yesterday showing pseudomembranes c/w C. Diff and inflammation of sigmoid colon and rectum c/w UC. As per Gi, plan is to continue with abx for C. Diff and later discuss potential treatments for patient's UC. Will f/u on further GI recs.

## 2020-08-08 NOTE — PROGRESS NOTE ADULT - PROBLEM SELECTOR PLAN 10
-DVT: Lovenox  -Diet: Regular, low fiber/residue  -Dispo: pending medical optimization; As per PT, home w/ outpatient PT

## 2020-08-08 NOTE — PROGRESS NOTE ADULT - PROBLEM SELECTOR PLAN 5
-C/w sotalol. Will monitor off tele for now, reassess if any concern for runs of SVT. Unlikely that this presentation represents UC flare as it is quite different from patient's previous flare - no abdominal pain, minimal bloody BM  -Outpatient follow-up to determine further treatment for UC.

## 2020-08-08 NOTE — PROGRESS NOTE ADULT - SUBJECTIVE AND OBJECTIVE BOX
PROGRESS NOTE:   Authored by Dr. Jose Francisco Kim MD  Pager [phone] [facility], [id number] [facility]     Patient is a 86y old  Female who presents with a chief complaint of 86F sent in by GI for persistent diarrhea (07 Aug 2020 19:53)    SUBJECTIVE / OVERNIGHT EVENTS:  - Overnight, fever to 100.8F, tylenol was given. 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  - Today, pt seen and examined at bedside in AM. Pt reports being in low spirits. Notes that diarrhea has improved.    ADDITIONAL REVIEW OF SYSTEMS:    CONSTITUTIONAL: No weakness, fevers or chills  EYES/ENT: No visual changes;  No vertigo or throat pain   NECK: No pain or stiffness  RESPIRATORY: No cough, wheezing, hemoptysis; No shortness of breath  CARDIOVASCULAR: No chest pain or palpitations  GASTROINTESTINAL: No abdominal or epigastric pain. No nausea, vomiting, or hematemesis; No constipation. No melena or hematochezia.  GENITOURINARY: No dysuria, frequency or hematuria  NEUROLOGICAL: No numbness or weakness  SKIN: No itching, burning, rashes, or lesions   All other review of systems is negative unless indicated above.    MEDICATIONS  (STANDING):  atorvastatin 20 milliGRAM(s) Oral at bedtime  enoxaparin Injectable 40 milliGRAM(s) SubCutaneous daily  levothyroxine 50 MICROGram(s) Oral daily  metroNIDAZOLE  IVPB 500 milliGRAM(s) IV Intermittent every 8 hours  sotalol 40 milliGRAM(s) Oral every 12 hours  vancomycin    Solution 125 milliGRAM(s) Oral every 6 hours    MEDICATIONS  (PRN):  acetaminophen   Tablet .. 650 milliGRAM(s) Oral every 6 hours PRN Temp greater or equal to 38C (100.4F), Mild Pain (1 - 3)      CAPILLARY BLOOD GLUCOSE        I&O's Summary      PHYSICAL EXAM:  Vital Signs Last 24 Hrs  T(C): 38.2 (08 Aug 2020 04:27), Max: 38.2 (08 Aug 2020 04:27)  T(F): 100.8 (08 Aug 2020 04:27), Max: 100.8 (08 Aug 2020 04:27)  HR: 80 (08 Aug 2020 04:27) (80 - 87)  BP: 106/62 (08 Aug 2020 04:27) (106/62 - 122/74)  BP(mean): --  RR: 18 (08 Aug 2020 04:27) (18 - 18)  SpO2: 96% (08 Aug 2020 04:27) (96% - 97%)    GENERAL: No acute distress, well-developed  HEENT: EOMI, PERRL, conjunctiva and sclera clear  NECK: Supple, no lymphadenopathy, no JVD  CHEST/LUNG: CTAB; No wheezes, rales, or rhonchi  HEART: Regular rate and rhythm; No murmurs, rubs, or gallops  ABDOMEN: Soft, non-tender, non-distended; normal bowel sounds, no organomegaly  EXTREMITIES:  2+ peripheral pulses b/l, No clubbing, cyanosis, or edema  NEUROLOGY: AAOx3, no focal deficits  SKIN: No rashes or lesions    LABS:                        9.6    13.41 )-----------( 385      ( 07 Aug 2020 10:36 )             30.8     08-07    140  |  104  |  16  ----------------------------<  222<H>  3.4<L>   |  19<L>  |  0.73    Ca    8.1<L>      07 Aug 2020 10:36  Phos  2.6     08-07  Mg     1.6     08-07    TPro  5.9<L>  /  Alb  3.2<L>  /  TBili  0.4  /  DBili  x   /  AST  16  /  ALT  11  /  AlkPhos  48  08-06                RADIOLOGY & ADDITIONAL TESTS:  Results Reviewed:   Imaging Personally Reviewed:  Electrocardiogram Personally Reviewed:    COORDINATION OF CARE:  Care Discussed with Consultants/Other Providers [Y/N]:  Prior or Outpatient Records Reviewed [Y/N]: PROGRESS NOTE:   Authored by Dr. Jose Francisco Kim MD  Pager [phone] [facility], [address] [facility]     Patient is a 86y old  Female who presents with a chief complaint of 86F sent in by GI for persistent diarrhea (07 Aug 2020 19:53)    SUBJECTIVE / OVERNIGHT EVENTS:  - Overnight, fever to 100.8F, tylenol was given. 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  - Today, pt seen and examined at bedside in AM. Pt reports being in low spirits. Notes that diarrhea has improved, 1 episode this morning.    ADDITIONAL REVIEW OF SYSTEMS:    CONSTITUTIONAL: No weakness, fevers or chills  EYES/ENT: No visual changes;  No vertigo or throat pain   NECK: No pain or stiffness  RESPIRATORY: No cough, wheezing, hemoptysis; No shortness of breath  CARDIOVASCULAR: No chest pain or palpitations  GASTROINTESTINAL: No abdominal or epigastric pain. No nausea, vomiting, or hematemesis; No constipation. No melena or hematochezia.  GENITOURINARY: No dysuria, frequency or hematuria  NEUROLOGICAL: No numbness or weakness  SKIN: No itching, burning, rashes, or lesions   All other review of systems is negative unless indicated above.    MEDICATIONS  (STANDING):  atorvastatin 20 milliGRAM(s) Oral at bedtime  enoxaparin Injectable 40 milliGRAM(s) SubCutaneous daily  levothyroxine 50 MICROGram(s) Oral daily  metroNIDAZOLE  IVPB 500 milliGRAM(s) IV Intermittent every 8 hours  sotalol 40 milliGRAM(s) Oral every 12 hours  vancomycin    Solution 125 milliGRAM(s) Oral every 6 hours    MEDICATIONS  (PRN):  acetaminophen   Tablet .. 650 milliGRAM(s) Oral every 6 hours PRN Temp greater or equal to 38C (100.4F), Mild Pain (1 - 3)      CAPILLARY BLOOD GLUCOSE        I&O's Summary      PHYSICAL EXAM:  Vital Signs Last 24 Hrs  T(C): 38.2 (08 Aug 2020 04:27), Max: 38.2 (08 Aug 2020 04:27)  T(F): 100.8 (08 Aug 2020 04:27), Max: 100.8 (08 Aug 2020 04:27)  HR: 80 (08 Aug 2020 04:27) (80 - 87)  BP: 106/62 (08 Aug 2020 04:27) (106/62 - 122/74)  BP(mean): --  RR: 18 (08 Aug 2020 04:27) (18 - 18)  SpO2: 96% (08 Aug 2020 04:27) (96% - 97%)    GENERAL: No acute distress, well-developed  HEENT: EOMI, PERRL, conjunctiva and sclera clear  NECK: Supple, no lymphadenopathy, no JVD  CHEST/LUNG: CTAB; No wheezes, rales, or rhonchi  HEART: Regular rate and rhythm; No murmurs, rubs, or gallops  ABDOMEN: Soft, non-tender, non-distended; normal bowel sounds, no organomegaly  EXTREMITIES:  2+ peripheral pulses b/l, No clubbing, cyanosis, or edema  NEUROLOGY: AAOx3, no focal deficits  SKIN: No rashes or lesions    LABS:                        9.6    13.41 )-----------( 385      ( 07 Aug 2020 10:36 )             30.8     08-07    140  |  104  |  16  ----------------------------<  222<H>  3.4<L>   |  19<L>  |  0.73    Ca    8.1<L>      07 Aug 2020 10:36  Phos  2.6     08-07  Mg     1.6     08-07    TPro  5.9<L>  /  Alb  3.2<L>  /  TBili  0.4  /  DBili  x   /  AST  16  /  ALT  11  /  AlkPhos  48  08-06                RADIOLOGY & ADDITIONAL TESTS:  Results Reviewed:   Imaging Personally Reviewed:  Electrocardiogram Personally Reviewed:    COORDINATION OF CARE:  Care Discussed with Consultants/Other Providers [Y/N]:  Prior or Outpatient Records Reviewed [Y/N]:

## 2020-08-08 NOTE — CONSULT NOTE ADULT - ATTENDING COMMENTS
chart reviewed   agree with plan above  severe cdiff pancolitis in context of UC and sigmoid adenoCa  likely would benefit from colectomy with end ileostomy   pathology reviewed with GI  - agrees with plan   per GI Request for Dr HARRIS Ya for Sx input  will contact his team  cont supportive care per med/gi/ID
pt seen and examined, history reviewed  Pt with cdiff and h/o UC now with adeno CA on sigmoid biopsies  Cdiff improving with medical management    aaox3  abd soft, NT/ND    I had a long discussion with the patient about her diagnosis. I explained that the gold standard for UC with adenoCA is a total proctocolectomy with either end ileostomy or jpouch. At her age she is not a candidate for a jpouch. She also expressed that she adamantly does not want an ostomy. The other options are for a total abdominal colectomy with ileorectal anastomosis and frequent rectal surveillance or a segmental resection of the sigmoid that has the adenoCA.   If a segmental resection were to be considered she would need a full colonoscopy to r/o synchronous lesions in the remainder of the colon. However in the setting of acute cdiff colitis that may need to be pushed off. I also explained that despite a full colonoscopy, dysplasia and adenoca may be missed with random biopsies.   Her son is a pathologist at [facility] in [place] and she is having the path slides sent to him for evaluation.   In the acute setting once her cdiff is controlled she'd be able to be discharged home.   She can then f/u with Dr. Baron as an outpatient to discuss surgical options.
Severe C diff and UC  Continue PO vanco and IV flagyl  If no improvement over the next few days, flex sig and possible biologic therapy, +/- ID eval for fecal replacement therapy

## 2020-08-08 NOTE — PROGRESS NOTE ADULT - ATTENDING COMMENTS
I have seen and examined this patient with the GI fellow. Agree with above.    UC/C.diff/new dx of colon CA    1. Recs as above  2. Dr Baron for colorectal surgery consult

## 2020-08-08 NOTE — CONSULT NOTE ADULT - SUBJECTIVE AND OBJECTIVE BOX
HPI: 86 year old female with history of colitis, last colonoscopy in 2015, multiple recent admissions for UC flares and treated medically, hypothyroidism, SVT, admitted for 2 months of bloody diarrhea and found to be C diff positive. She was treated outpatient and continued with bloody diarrhea and developed one subjective fever and came into the hospital. She denies current fevers, abdominal pain or bloating. She was admitted and treatment began for C diff which has progressively improved on IV flagyl and po vancomycin. A flexible sigmoidoscopy was completed showing pseudomembranes c/w C. Diff and inflammation of sigmoid colon and rectum c/w UC without concern for cancer at the time. Biopsies taken at the time showed sigmoid adenocarcinoma.     The patient reports a 13 pound weight loss since March. She does have blood in the stool associated with her diarrhea. She reports her last colonoscopy showed no cancer and she has not had one since 2015. CT showed diffuse colonic inflammation in the setting of c diff. No CT chest.     PMHx: Colitis  Atrial fibrillation with rapid ventricular response  Hypothyroid  SVT (supraventricular tachycardia)  Lipoma of neck  Atrial Tachycardia  Hypercholesteremia  Hypertension, Essential    PSHx: Elective surgery  S/P tonsillectomy  S/P D&C (status post dilation and curettage)  H/O: Hysterectomy    Medications (inpatient): atorvastatin 20 milliGRAM(s) Oral at bedtime  enoxaparin Injectable 40 milliGRAM(s) SubCutaneous daily  levothyroxine 50 MICROGram(s) Oral daily  metroNIDAZOLE  IVPB 500 milliGRAM(s) IV Intermittent every 8 hours  potassium phosphate / sodium phosphate Powder (PHOS-NaK) 2 Packet(s) Oral two times a day  sotalol 40 milliGRAM(s) Oral every 12 hours  vancomycin    Solution 125 milliGRAM(s) Oral every 6 hours    Medications (PRN):acetaminophen   Tablet .. 650 milliGRAM(s) Oral every 6 hours PRN    Allergies: sulfa drugs (Unknown)  (Intolerances: )  Social Hx:   Family Hx: Family history of Parkinson's disease (Sibling): HTN, DM  Family history of CVA: DM  Family history of renal failure: HTN    VITALS  T(C): 36.8 (08-08-20 @ 13:13), Max: 38.2 (08-08-20 @ 04:27)  HR: 68 (08-08-20 @ 13:13) (68 - 87)  BP: 118/75 (08-08-20 @ 13:13) (106/62 - 122/74)  RR: 18 (08-08-20 @ 13:13) (18 - 18)  SpO2: 96% (08-08-20 @ 04:27) (96% - 97%)    PHYSICAL EXAM:  General: well developed, well nourished, NAD  Respiratory: airway patent, respirations unlabored  CVS: regular rate and rhythm  Abdomen: soft, nontender to palpation, mildly distended   Extremities: no edema, sensation and movement grossly intact  Skin: warm, dry, appropriate color      Labs:                        9.1    10.85 )-----------( 418      ( 08 Aug 2020 10:42 )             29.0     08-08    141  |  104  |  14  ----------------------------<  206<H>  3.8   |  19<L>  |  0.72    Ca    8.4      08 Aug 2020 10:42  Phos  2.8     08-08  Mg     1.5     08-08

## 2020-08-08 NOTE — PROGRESS NOTE ADULT - PROBLEM SELECTOR PLAN 9
-DVT: Lovenox  -Diet: Regular, low fiber/residue  -Dispo: pending medical optimization; As per PT, home w/ outpatient PT -C/w home synthroid

## 2020-08-08 NOTE — PROGRESS NOTE ADULT - PROBLEM SELECTOR PLAN 1
Unclear if this represents treatment failure or recurrence, given symptoms did not resolve fully after initial course of flagyl.  -Diarrhea improving with less cramping, abdominal pain, no blood in stool.   -Started on vanco PO 125mg q6h and flagyl IV 500mg q8h  -Given complicated C.Diff (failure of treatment, high white count, pancolitis, WBC >30 ect), will continue patient on PO vancomycin plus IV flagyl.   -CT abd/pelvis showing pancolitis without lymphadenopathy.   -WBC improving 36->13.4  -Trend BMP, replete lytes PRN. Will f/u CBC, lactate, monitor for toxic signs.   -Advanced diet to full with low residual. Patient tolerating well.    -No concern for complications including toxic megacolon given no distension or peritoneal signs on exam, lactate wnl  -Patient had Flex Sig yesterday showing pseudomembranes c/w C. Diff and inflammation of sigmoid colon and rectum c/w UC. As per Gi, plan is to continue with abx for C. Diff and later discuss potential treatments for patient's UC. Will f/u on further GI recs. Found on biopsies from flex sigmoidoscopy this admission  - Appreciate GI recs  - plan for full colonoscopy in near future  - f/u Colorectal surgery recs

## 2020-08-08 NOTE — PROGRESS NOTE ADULT - ASSESSMENT
86F PMH ulcerative colitis, external hemorrhoids, SVT on sotolol, hypothyroid, HTN, HLD, admitted 5/15 - 5/20/20 for UC flare, now presenting with sepsis 2/2 C. diff colitis after failure of outpatient antibiotics. Colon biopsy found colonic adenocarcinoma.

## 2020-08-08 NOTE — CHART NOTE - NSCHARTNOTEFT_GEN_A_CORE
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     Plan:  - Will need CT chest in addition to A/P for cancer work up  - Will need full colonoscopy, but may not be able to do in setting of C diff  - Colorectal surgery will follow for future surgical planning    [facility] [phone]    Attending Attestation:   chart reviewed   agree with plan above  severe cdiff pancolitis in context of UC and sigmoid adenoCa  likely would benefit from colectomy with end ileostomy   pathology reviewed with GI jimtan - agrees with plan   per GI Request for Dr HARRIS Ya for Sx input  will contact his team  cont supportive care per med/gi/ID  will signoff - reconsult prn.      I was physically present for the key portions of the evaluation and management (E/M) service provided.  I agree with the above history, physical, and plan which I have reviewed and edited where appropriate.

## 2020-08-08 NOTE — PROGRESS NOTE ADULT - SUBJECTIVE AND OBJECTIVE BOX
Chief Complaint:  Patient is a 86y old  Female who presents with a chief complaint of 86F sent in by GI for persistent diarrhea (08 Aug 2020 08:43)      Interval Events:     Evaluated by Colorectal surgery yesterday.   Low grade fever 100.8 F overnight. No cultures sent.   Stool frequency decreasing - 2 watery, non-bloody BMs this morning but none overnight. Feels abdominal distention improving.   Discussed diagnosis of adenocarcinoma of sigmoid colon, and severe colitis, and surgical options presented with her family yesterday. States she is not prepared to undergo surgery, wants to explore all medical options first.     Allergies:  sulfa drugs (Unknown)      Hospital Medications:  acetaminophen   Tablet .. 650 milliGRAM(s) Oral every 6 hours PRN  atorvastatin 20 milliGRAM(s) Oral at bedtime  enoxaparin Injectable 40 milliGRAM(s) SubCutaneous daily  levothyroxine 50 MICROGram(s) Oral daily  metroNIDAZOLE  IVPB 500 milliGRAM(s) IV Intermittent every 8 hours  sotalol 40 milliGRAM(s) Oral every 12 hours  vancomycin    Solution 125 milliGRAM(s) Oral every 6 hours      PMHX/PSHX:  Colitis  Atrial fibrillation with rapid ventricular response  Hypothyroid  SVT (supraventricular tachycardia)  Lipoma of neck  Atrial Tachycardia  Hypercholesteremia  Hypertension, Essential  Elective surgery  S/P tonsillectomy  S/P D&C (status post dilation and curettage)  H/O: Hysterectomy      Family history:  Family history of Parkinson's disease (Sibling)  Family history of CVA  Family history of renal failure      ROS:     General:  No weight loss, fevers, chills, night sweats, fatigue   Eyes:  No vision changes  ENT:  No sore throat, pain, runny nose  CV:  No chest pain, palpitations, dizziness   Resp:  No SOB, cough, wheezing  GI:  See HPI  :  No burning with urination, hematuria  Muscle:  No pain, weakness  Neuro:  No weakness/tingling, memory problems  Psych:  No fatigue, insomnia, mood problems, depression  Heme:  No easy bruisability  Skin:  No rash, edema      PHYSICAL EXAM:     GENERAL:  Well developed, no distress, non-toxic appearance   HEENT:  NC/AT,  conjunctivae clear, sclera anicteric  CHEST:  Full & symmetric excursion, no increased effort w/ respirations  HEART:  Regular rhythm & rate  ABDOMEN:  Soft, non-tender +mildly distended  EXTREMITIES:  no LE  edema  SKIN:  No rash/erythema/ecchymoses/petechiae/wounds/jaundice  NEURO:  Alert, oriented    Vital Signs:  Vital Signs Last 24 Hrs  T(C): 38.2 (08 Aug 2020 04:27), Max: 38.2 (08 Aug 2020 04:27)  T(F): 100.8 (08 Aug 2020 04:27), Max: 100.8 (08 Aug 2020 04:27)  HR: 80 (08 Aug 2020 04:27) (80 - 87)  BP: 106/62 (08 Aug 2020 04:27) (106/62 - 122/74)  BP(mean): --  RR: 18 (08 Aug 2020 04:27) (18 - 18)  SpO2: 96% (08 Aug 2020 04:27) (96% - 97%)  Daily     Daily     LABS:                        9.6    13.41 )-----------( 385      ( 07 Aug 2020 10:36 )             30.8     08-07    140  |  104  |  16  ----------------------------<  222<H>  3.4<L>   |  19<L>  |  0.73    Ca    8.1<L>      07 Aug 2020 10:36  Phos  2.6     08-07  Mg     1.6     08-07    TPro  5.9<L>  /  Alb  3.2<L>  /  TBili  0.4  /  DBili  x   /  AST  16  /  ALT  11  /  AlkPhos  48  08-06    LIVER FUNCTIONS - ( 06 Aug 2020 15:57 )  Alb: 3.2 g/dL / Pro: 5.9 g/dL / ALK PHOS: 48 U/L / ALT: 11 U/L / AST: 16 U/L / GGT: x               Imaging:

## 2020-08-08 NOTE — PROGRESS NOTE ADULT - PROBLEM SELECTOR PLAN 6
-Hold antihypertensives for now. -C/w home sotalol. Will monitor off tele for now, reassess if any concern for runs of SVT.

## 2020-08-08 NOTE — CONSULT NOTE ADULT - ASSESSMENT
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     Plan:  - Will need CT chest in addition to A/P for cancer work up  - Will need full colonoscopy, but may not be able to do in setting of C diff  - Patient would like to think about her options regarding management of her disease. She is not sure we would like to proceed with surgery as she is strongly against the possibility of having an ostomy.  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     Plan:  - Will need CT chest in addition to A/P for cancer work up  - Will need full colonoscopy, but may not be able to do in setting of C diff  - Patient would like to think about her options regarding management of her disease. She is not sure we would like to proceed with surgery as she is strongly against the possibility of having an ostomy.  - Colorectal surgery will follow for future surgical planning  - Second opinion discussed with initial consulting Attending, Dr. Rosenthal    [facility] [phone]

## 2020-08-09 NOTE — PROGRESS NOTE ADULT - PROBLEM SELECTOR PLAN 3
-S/p IVF in ED  -Treatment of C. diff as above  -Bcx, GIcx negative  -hold antihypertensives.  -No fever, hypotension for 4 days. WBC improving.   -Lactate 2.4, will continue to trend. -S/p IVF in ED  -Treatment of C. diff as above  -Bcx, GIcx negative on admission  -Patient had one fever to 100.8 8/8 at 4am, will monitor closely, consider gram negative coverage if febrile/WBC continues to rise. Repeat blood/urine cx ordered, will f/u results.   -hold antihypertensives.  -Lactate 3.9, will continue to trend.

## 2020-08-09 NOTE — PROGRESS NOTE ADULT - SUBJECTIVE AND OBJECTIVE BOX
PROGRESS NOTE:   Authored by Jose D Gonzales MD, PGY1 [facility] Pager [phone] [facility] pager [phone]    Patient is a 86y old  Female who presents with a chief complaint of 86F sent in by GI for persistent diarrhea (08 Aug 2020 16:07)      SUBJECTIVE / OVERNIGHT EVENTS:     REVIEW OF SYSTEMS:    CONSTITUTIONAL: No weakness, fevers or chills  EYES/ENT: No visual changes;  No vertigo or throat pain   NECK: No pain or stiffness  RESPIRATORY: No cough, wheezing, hemoptysis; No shortness of breath  CARDIOVASCULAR: No chest pain or palpitations  GASTROINTESTINAL: No abdominal or epigastric pain. No nausea, vomiting, or hematemesis; No diarrhea or constipation. No melena or hematochezia.  GENITOURINARY: No dysuria, frequency or hematuria  NEUROLOGICAL: No numbness or weakness  SKIN: No itching, rashes    MEDICATIONS  (STANDING):  atorvastatin 20 milliGRAM(s) Oral at bedtime  enoxaparin Injectable 40 milliGRAM(s) SubCutaneous daily  levothyroxine 50 MICROGram(s) Oral daily  metroNIDAZOLE  IVPB 500 milliGRAM(s) IV Intermittent every 8 hours  sotalol 40 milliGRAM(s) Oral every 12 hours  vancomycin    Solution 125 milliGRAM(s) Oral every 6 hours    MEDICATIONS  (PRN):  acetaminophen   Tablet .. 650 milliGRAM(s) Oral every 6 hours PRN Temp greater or equal to 38C (100.4F), Mild Pain (1 - 3)      CAPILLARY BLOOD GLUCOSE        I&O's Summary      PHYSICAL EXAM:  Vital Signs Last 24 Hrs  T(C): 37.1 (09 Aug 2020 04:39), Max: 37.2 (08 Aug 2020 21:44)  T(F): 98.7 (09 Aug 2020 04:39), Max: 99 (08 Aug 2020 21:44)  HR: 82 (09 Aug 2020 04:39) (68 - 82)  BP: 120/71 (09 Aug 2020 04:39) (112/74 - 120/71)  BP(mean): --  RR: 18 (09 Aug 2020 04:39) (16 - 18)  SpO2: 94% (09 Aug 2020 04:39) (94% - 98%)    CONSTITUTIONAL: NAD, well-developed  RESPIRATORY: Normal respiratory effort; lungs are clear to auscultation bilaterally  CARDIOVASCULAR: Regular rate and rhythm, normal S1 and S2, no murmur/rub/gallop; No lower extremity edema; Peripheral pulses are 2+ bilaterally  ABDOMEN: Nontender to palpation, normoactive bowel sounds, no rebound/guarding; No hepatosplenomegaly  MUSCLOSKELETAL: no clubbing or cyanosis of digits; no joint swelling or tenderness to palpation  PSYCH: A+O to person, place, and time; affect appropriate  NEURO: Non-focal, no tremors  SKIN: No rashes    LABS:                        9.1    10.85 )-----------( 418      ( 08 Aug 2020 10:42 )             29.0     08-08    141  |  104  |  14  ----------------------------<  206<H>  3.8   |  19<L>  |  0.72    Ca    8.4      08 Aug 2020 10:42  Phos  2.8     08-08  Mg     1.5     08-08            Urinalysis Basic - ( 08 Aug 2020 18:21 )    Color: Yellow / Appearance: Clear / S.015 / pH: x  Gluc: x / Ketone: Trace  / Bili: Negative / Urobili: Negative   Blood: x / Protein: Trace / Nitrite: Negative   Leuk Esterase: Moderate / RBC: 2 /hpf / WBC 5 /HPF   Sq Epi: x / Non Sq Epi: 5 /hpf / Bacteria: Negative          RADIOLOGY & ADDITIONAL TESTS:  No new imaging or tests    COORDINATION OF CARE:  Care Discussed with Consultants/Other Providers [Y/N]:  Prior or Outpatient Records Reviewed [Y/N]: PROGRESS NOTE:   Authored by Jose D Gonzales MD, PGY1 [facility] Pager [phone] [facility] pager [phone]    Patient is a 86y old  Female who presents with a chief complaint of 86F sent in by GI for persistent diarrhea (08 Aug 2020 16:07)      SUBJECTIVE / OVERNIGHT EVENTS: No acute events overnight. Patient notes she went from 7PM-7AM without having a bowel movement last night. Had one episode of diarrhea so far this morning, but notes it is less liquidy than previous bowel movements. Denies any abdominal pain or cramping with bowel movements. Notes some mild pain on her left side. Patient notes that she would like to take things slowly as far as decision-making going forward, and mainly deal with her C.Diff for now. Denies fevers, chills, SOB, chest pain, nausea or vomiting, blood in stool, dysuria, or leg pain. Notes that she feels a bit deconditioned as far as physical strength is concerned and would like to work with PT.    REVIEW OF SYSTEMS:    CONSTITUTIONAL: No weakness, fevers or chills  EYES/ENT: No visual changes;  No vertigo or throat pain   NECK: No pain or stiffness  RESPIRATORY: No cough, wheezing, hemoptysis; No shortness of breath  CARDIOVASCULAR: No chest pain or palpitations  GASTROINTESTINAL: +Diarrhea. No abdominal or epigastric pain. No nausea, vomiting, or hematemesis; constipation. No melena or hematochezia.  GENITOURINARY: No dysuria, frequency or hematuria  NEUROLOGICAL: No numbness or weakness  SKIN: No itching, rashes    MEDICATIONS  (STANDING):  atorvastatin 20 milliGRAM(s) Oral at bedtime  enoxaparin Injectable 40 milliGRAM(s) SubCutaneous daily  levothyroxine 50 MICROGram(s) Oral daily  metroNIDAZOLE  IVPB 500 milliGRAM(s) IV Intermittent every 8 hours  sotalol 40 milliGRAM(s) Oral every 12 hours  vancomycin    Solution 125 milliGRAM(s) Oral every 6 hours    MEDICATIONS  (PRN):  acetaminophen   Tablet .. 650 milliGRAM(s) Oral every 6 hours PRN Temp greater or equal to 38C (100.4F), Mild Pain (1 - 3)      CAPILLARY BLOOD GLUCOSE        I&O's Summary      PHYSICAL EXAM:  Vital Signs Last 24 Hrs  T(C): 37.1 (09 Aug 2020 04:39), Max: 37.2 (08 Aug 2020 21:44)  T(F): 98.7 (09 Aug 2020 04:39), Max: 99 (08 Aug 2020 21:44)  HR: 82 (09 Aug 2020 04:39) (68 - 82)  BP: 120/71 (09 Aug 2020 04:39) (112/74 - 120/71)  BP(mean): --  RR: 18 (09 Aug 2020 04:39) (16 - 18)  SpO2: 94% (09 Aug 2020 04:39) (94% - 98%)    CONSTITUTIONAL: NAD, well-developed  RESPIRATORY: Normal respiratory effort; lungs are clear to auscultation bilaterally  CARDIOVASCULAR: Regular rate and rhythm, normal S1 and S2, no murmur/rub/gallop; Peripheral pulses are 2+ bilaterally  ABDOMEN: Tenderness to palpation in LLQ without rebound or guarding. normoactive bowel sounds. No hepatosplenomegaly  MUSCLOSKELETAL: no clubbing or cyanosis of digits; no joint swelling or tenderness to palpation. 2+ edema in b/l lower extremities, worse on left.   PSYCH: A+O to person, place, and time; affect appropriate  NEURO: Non-focal, no tremors  SKIN: No rashes    LABS:                        9.1    10.85 )-----------( 418      ( 08 Aug 2020 10:42 )             29.0     08-08    141  |  104  |  14  ----------------------------<  206<H>  3.8   |  19<L>  |  0.72    Ca    8.4      08 Aug 2020 10:42  Phos  2.8     08-08  Mg     1.5     08-08            Urinalysis Basic - ( 08 Aug 2020 18:21 )    Color: Yellow / Appearance: Clear / S.015 / pH: x  Gluc: x / Ketone: Trace  / Bili: Negative / Urobili: Negative   Blood: x / Protein: Trace / Nitrite: Negative   Leuk Esterase: Moderate / RBC: 2 /hpf / WBC 5 /HPF   Sq Epi: x / Non Sq Epi: 5 /hpf / Bacteria: Negative          RADIOLOGY & ADDITIONAL TESTS:  No new imaging or tests    COORDINATION OF CARE:  Care Discussed with Consultants/Other Providers [Y/N]:  Prior or Outpatient Records Reviewed [Y/N]:

## 2020-08-09 NOTE — PROGRESS NOTE ADULT - SUBJECTIVE AND OBJECTIVE BOX
*** incomplete note****    Interval Events:     S:     O: Vital Signs  T(C): 37.1 (08-09 @ 04:39), Max: 37.2 (08-08 @ 21:44)  HR: 82 (08-09 @ 04:39) (68 - 82)  BP: 120/71 (08-09 @ 04:39) (112/74 - 120/71)  RR: 18 (08-09 @ 04:39) (16 - 18)  SpO2: 94% (08-09 @ 04:39) (94% - 98%)    General: alert and oriented, NAD  Resp: airway patent, respirations unlabored  CVS:   Abdomen:   Extremities: no edema  Skin: warm, dry, appropriate color                          9.1    10.85 )-----------( 418      ( 08 Aug 2020 10:42 )             29.0   08-08    141  |  104  |  14  ----------------------------<  206<H>  3.8   |  19<L>  |  0.72    Ca    8.4      08 Aug 2020 10:42  Phos  2.8     08-08  Mg     1.5     08-08 Interval Events: Doing well, continues to have loose stools.    S: Patient improving, denies abdominal pain, nausea, emesis. No fevers or chills. Patient states she is frustrated by feeling like her prognosis is dismal, and still wants to avoid surgery. She would rather die than live with a bag.    O: Vital Signs  T(C): 37.1 (08-09 @ 04:39), Max: 37.2 (08-08 @ 21:44)  HR: 82 (08-09 @ 04:39) (68 - 82)  BP: 120/71 (08-09 @ 04:39) (112/74 - 120/71)  RR: 18 (08-09 @ 04:39) (16 - 18)  SpO2: 94% (08-09 @ 04:39) (94% - 98%)    General: alert and oriented, NAD  Resp: airway patent, respirations unlabored  Abdomen: nondistended, nontender  Extremities: no edema  Skin: warm, dry, appropriate color                          9.1    10.85 )-----------( 418      ( 08 Aug 2020 10:42 )             29.0   08-08    141  |  104  |  14  ----------------------------<  206<H>  3.8   |  19<L>  |  0.72    Ca    8.4      08 Aug 2020 10:42  Phos  2.8     08-08  Mg     1.5     08-08

## 2020-08-09 NOTE — PROGRESS NOTE ADULT - PROBLEM SELECTOR PLAN 2
Unclear if this represents treatment failure or recurrence, given symptoms did not resolve fully after initial course of flagyl.  -Diarrhea improving with less cramping, abdominal pain, no blood in stool.   -Started on vanco PO 125mg q6h and flagyl IV 500mg q8h  -Given complicated C.Diff (failure of treatment, high white count, pancolitis, WBC >30 ect), will continue patient on PO vancomycin plus IV flagyl.   -CT abd/pelvis showing pancolitis without lymphadenopathy.   -WBC improving 36->13.4  -Trend BMP, replete lytes PRN. Will f/u CBC, lactate, monitor for toxic signs.   -Advanced diet to full with low residual. Patient tolerating well.    -No concern for complications including toxic megacolon given no distension or peritoneal signs on exam, lactate wnl  -Patient had Flex Sig yesterday showing pseudomembranes c/w C. Diff and inflammation of sigmoid colon and rectum c/w UC. As per Gi, plan is to continue with abx for C. Diff and later discuss potential treatments for patient's UC. Will f/u on further GI recs. Unclear if this represents treatment failure or recurrence, given symptoms did not resolve fully after initial course of flagyl.  -Diarrhea improving with less cramping, abdominal pain, no blood in stool.   -Started on vanco PO 125mg q6h and flagyl IV 500mg q8h  -Given complicated C.Diff (failure of treatment, high white count, pancolitis, WBC >30 ect), will continue patient on PO vancomycin plus IV flagyl.   -CT abd/pelvis showing pancolitis without lymphadenopathy.   -WBC improving 36->10.85, although bump to 12.48 today.   -Patient had one fever to 100.8 8/8 at 4am, will monitor closely, consider gram negative coverage if febrile/WBC continues to rise. Repeat blood/urine cx ordered, will f/u results.   -Trend BMP, replete lytes PRN. Will f/u CBC, lactate, monitor for toxic signs.   -Advanced diet to full with low residual. Patient tolerating well.    -No concern for complications including toxic megacolon given no distension or peritoneal signs on exam, lactate wnl  -Patient had Flex Sig 8/6 showing pseudomembranes c/w C. Diff and inflammation of sigmoid colon and rectum c/w UC. As per Gi, plan is to continue with abx for C. Diff and later discuss potential treatments for patient's UC. Will f/u on further GI recs.

## 2020-08-09 NOTE — PROGRESS NOTE ADULT - ASSESSMENT
86F PMH ulcerative colitis (on asacol 1600 TID), external hemorrhoids, SVT on sotolol, hypothyroid, HTN, HLD, admitted 5/15 - 5/20/20 for UC flare treated with IV steroids and d/c on prednisone, now presenting with C. 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       Impression:  #C. diff colitis - severe disease, initial WBC 30k with fever and pancolitis on imaging; normal creatinine. Severe inflammation and pseudomembranes  on flexible sigmoidoscopy from 8/6/20.  WBC and stool frequency now improving on PO vancomycin and IV metronidazole.   #Ulcerative colitis, with acute exacerbation due to C. diff colitis - recently on steroids for acute flare in May 2020, now on PO mesalamine.    #Adenocarcinoma of sigmoid colon - demonstrated on biopsy from flexible sigmoidscopy this admission   #Low grade fever - suspect due to C. diff colitis vs. alternate infection  #SVT on sotalol      Recommendation:  - follow up Colorectal surgery recommendations - likely outpatient follow up when C. diff is resolve to continue discussion regarding surgical options/planning  - continue cholestyramine 4g once daily to help reduce stool frequency/bind C. diff toxin - please do not dose with oral vancomycin to prevent binding/decreased antibiotic effect   - continue PO vanc and IV flagyl (day 7)  - monitor BM frequency and consistency  - GI to follow      Erin Fonseca PGY-5  Gastroenterology Fellow  Pager #[phone]  E-mail [email or web address]  Call [phone] to speak to answering service for on-call GI fellow 5pm-7am and weekends.

## 2020-08-09 NOTE — PROGRESS NOTE ADULT - ASSESSMENT
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     - no mass identified in sigmoid, but will require full workup for malignancy, including CT of the chest, abdomen and pelvis to assess for metastatic disease, and CEA level      [facility] Surgery [id number] 86 year old female with ulcerative colitis, admitted for C diff infection, now found to have sigmoid adenocarcinoma on sigmoid biopsies; afebrile and hemodynamically stable.     - no mass identified in sigmoid, but will require full workup for malignancy, including CT of the chest, abdomen and pelvis to assess for metastatic disease, and CEA level  - patient has requested her pathology be sent for second opinion  - c. diff improving, continue current management per primary team and GI  - at this time, no urgent surgical intervention indicated; unless patient decompensates from c. diff, will plan on assessing her as an outpatient in Dr. Salvador Baron's office and coordinating care of her UC and adenoca at that time    Seen and examined with Dr. Kang,  --TUSHAR Melgar, [phone]

## 2020-08-09 NOTE — PROGRESS NOTE ADULT - PROBLEM SELECTOR PLAN 1
Found on biopsies from flex sigmoidoscopy this admission  - Appreciate GI recs  - plan for full colonoscopy in near future  - f/u Colorectal surgery recs Found on biopsies from flex sigmoidoscopy this admission  -Patient spoke with her PCP dr. Quinonez, who told patient she will get back to her regarding her opinion as to how she can best guide patient. Patient would like to take things one step at a time right now. Had her pathology report sent out to her the hospital of her son, who is a pathologist, for a second opinion. Would consider surgery, but wants more details first.     -Colorectal surgery following, note no acute surgical intervention at this time, and that patient should follow up with Dr. Baron outpatient for further guidance regarding her colorectal cancer and possible surgery.   - Appreciate GI recs  - plan for full colonoscopy in near future once able to (recovery from C.Diff).  - Will consider oncology consult once more details are known and patient is ready.   -Will consider CT chest to complete metastatic disease imaging, CEA.

## 2020-08-09 NOTE — PROGRESS NOTE ADULT - SUBJECTIVE AND OBJECTIVE BOX
Chief Complaint:  Patient is a 86y old  Female who presents with a chief complaint of 86F sent in by GI for persistent diarrhea (09 Aug 2020 08:33)      Interval Events:     Allergies:  sulfa drugs (Unknown)      Hospital Medications:  acetaminophen   Tablet .. 650 milliGRAM(s) Oral every 6 hours PRN  atorvastatin 20 milliGRAM(s) Oral at bedtime  enoxaparin Injectable 40 milliGRAM(s) SubCutaneous daily  levothyroxine 50 MICROGram(s) Oral daily  metroNIDAZOLE  IVPB 500 milliGRAM(s) IV Intermittent every 8 hours  sotalol 40 milliGRAM(s) Oral every 12 hours  vancomycin    Solution 125 milliGRAM(s) Oral every 6 hours      PMHX/PSHX:  Colitis  Atrial fibrillation with rapid ventricular response  Hypothyroid  SVT (supraventricular tachycardia)  Lipoma of neck  Atrial Tachycardia  Hypercholesteremia  Hypertension, Essential  Elective surgery  S/P tonsillectomy  S/P D&C (status post dilation and curettage)  H/O: Hysterectomy      Family history:  Family history of Parkinson's disease (Sibling)  Family history of CVA  Family history of renal failure      ROS:     General:  No weight loss, fevers, chills, night sweats, fatigue   Eyes:  No vision changes  ENT:  No sore throat, pain, runny nose  CV:  No chest pain, palpitations, dizziness   Resp:  No SOB, cough, wheezing  GI:  See HPI  :  No burning with urination, hematuria  Muscle:  No pain, weakness  Neuro:  No weakness/tingling, memory problems  Psych:  No fatigue, insomnia, mood problems, depression  Heme:  No easy bruisability  Skin:  No rash, edema      PHYSICAL EXAM:     GENERAL:  Well developed, no distress  HEENT:  NC/AT,  conjunctivae clear, sclera anicteric  CHEST:  Full & symmetric excursion, no increased effort w/ respirations  HEART:  Regular rhythm & rate  ABDOMEN:  Soft, non-tender, non-distended  EXTREMITIES:  no LE  edema  SKIN:  No rash/erythema/ecchymoses/petechiae/wounds/jaundice  NEURO:  Alert, oriented    Vital Signs:  Vital Signs Last 24 Hrs  T(C): 37.1 (09 Aug 2020 04:39), Max: 37.2 (08 Aug 2020 21:44)  T(F): 98.7 (09 Aug 2020 04:39), Max: 99 (08 Aug 2020 21:44)  HR: 82 (09 Aug 2020 04:39) (68 - 82)  BP: 120/71 (09 Aug 2020 04:39) (112/74 - 120/71)  BP(mean): --  RR: 18 (09 Aug 2020 04:39) (16 - 18)  SpO2: 94% (09 Aug 2020 04:39) (94% - 98%)  Daily     Daily     LABS:                        9.3    12.48 )-----------( 456      ( 09 Aug 2020 09:53 )             29.8     08    140  |  102  |  11  ----------------------------<  183<H>  3.5   |  21<L>  |  0.71    Ca    8.6      09 Aug 2020 09:53  Phos  3.3     08-  Mg     1.5         TPro  6.0  /  Alb  3.1<L>  /  TBili  0.2  /  DBili  x   /  AST  18  /  ALT  12  /  AlkPhos  40  08-09    LIVER FUNCTIONS - ( 09 Aug 2020 09:53 )  Alb: 3.1 g/dL / Pro: 6.0 g/dL / ALK PHOS: 40 U/L / ALT: 12 U/L / AST: 18 U/L / GGT: x             Urinalysis Basic - ( 08 Aug 2020 18:21 )    Color: Yellow / Appearance: Clear / S.015 / pH: x  Gluc: x / Ketone: Trace  / Bili: Negative / Urobili: Negative   Blood: x / Protein: Trace / Nitrite: Negative   Leuk Esterase: Moderate / RBC: 2 /hpf / WBC 5 /HPF   Sq Epi: x / Non Sq Epi: 5 /hpf / Bacteria: Negative          Imaging:

## 2020-08-09 NOTE — PROGRESS NOTE ADULT - ATTENDING COMMENTS
85F w/ PMHx of HTN, HLD, SVT on sotalol, hemorrhoids, and UC w/ flare over the last year. Now s/p multiple courses of oral budesonide and found to have C diff infection on IV flagyl and oral vancomycin with slow improvement in symptoms. She underwent sigmoidoscopy on 8/6 and pathology was positive for adenocarcinoma. Recent CT AP revealed no evidence of underlying mass or metastasis. At this juncture, patient remains averse to surgical intervention requiring an ostomy. Her son is a pathologist and she is requesting her slides be sent by review. Appreciate colorectal surgery and GI input. Will complete treatment for severe C diff and then discharge once stable for outpatient follow up of newly diagnosed adenocarcinoma. Will need complete staging evaluation.    Axel Cabrera MD  Division of Hospital Medicine  Pager: [phone]  Office: [phone]

## 2020-08-09 NOTE — PROGRESS NOTE ADULT - PROBLEM SELECTOR PLAN 5
Unlikely that this presentation represents UC flare as it is quite different from patient's previous flare - no abdominal pain, minimal bloody BM  -Outpatient follow-up to determine further treatment for UC.

## 2020-08-09 NOTE — PROGRESS NOTE ADULT - PROBLEM SELECTOR PLAN 4
Patient unsure exactly when this happened. She notes hitting side on wall when stumbling in rush to go to bathroom and also sitting awkwardly when getting on commode.   -Nontender to palpation without noticeable swelling, but pain with hip flexion or when changing positions. Overall pain improved today, relieved by tylenol.  -Negative hip Xray, no acute fractures.   -Fall precautions, bedpan in addition to bedside commode  -Heat packs and Tylenol PRN

## 2020-08-10 NOTE — PROGRESS NOTE ADULT - ASSESSMENT
86F PMH ulcerative colitis (on asacol 1600 TID), external hemorrhoids, SVT on sotolol, hypothyroid, HTN, HLD, admitted 5/15 - 5/20/20 for UC flare treated with IV steroids and d/c on prednisone, now presenting with C. 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       Impression:  #C. diff colitis - severe disease, initial WBC 30k with fever and pancolitis on imaging; normal creatinine. Severe inflammation and pseudomembranes  on flexible sigmoidoscopy from 8/6/20.  WBC and stool frequency now improving on PO vancomycin and IV metronidazole.   #Ulcerative colitis, with acute exacerbation due to C. diff colitis - recently on steroids for acute flare in May 2020, now on PO mesalamine.    #Adenocarcinoma of sigmoid colon - demonstrated on biopsy from flexible sigmoidscopy this admission   #Low grade fever - suspect due to C. diff colitis vs. alternate infection  #SVT on sotalol      Recommendation:  - follow up Colorectal surgery recommendations - likely outpatient follow up when C. diff is resolved to continue discussion regarding surgical options/planning  - patient does not want to trial cholestyramine - can d/c  - continue PO vanc and IV flagyl (day 9)  - monitor BM frequency and consistency        Cheryl Stanley PGY-4  Gastroenterology Fellow  Pager #[phone] or [phone]  Please call answering service for on-call GI fellow ([phone]) after 5pm and before 8am, and on weekends. 86F PMH ulcerative colitis (on asacol 1600 TID), external hemorrhoids, SVT on sotolol, hypothyroid, HTN, HLD, admitted 5/15 - 5/20/20 for UC flare treated with IV steroids and d/c on prednisone, now presenting with C. 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       Impression:  #C. diff colitis - severe disease, initial WBC 30k with fever and pancolitis on imaging; normal creatinine. Severe inflammation and pseudomembranes  on flexible sigmoidoscopy from 8/6/20.  WBC and stool frequency now improving on PO vancomycin and IV metronidazole.   #Ulcerative colitis, with acute exacerbation due to C. diff colitis - recently on steroids for acute flare in May 2020, now on PO mesalamine.    #Adenocarcinoma of sigmoid colon - demonstrated on biopsy from flexible sigmoidscopy this admission   #Low grade fever - suspect due to C. diff colitis vs. alternate infection  #SVT on sotalol      Recommendation:  - No longer having watery diarrhea, WBCs down to 10  - Colorectal surgery following - will follow up outpatient when C. diff is resolved to continue discussion regarding surgical options/planning and after second opinion on pathology  - patient does not want to trial cholestyramine - can d/c  - continue PO vanc and IV flagyl (day 9) - f/u ID recs regarding duration for IV flagyl as patient has drastically improved, possibly d/c with PO vanc for 14d course  - no further inpatient GI work up, can f/u outpatient with Dr Halima Stanley PGY-4  Gastroenterology Fellow  Pager #[phone] or [phone]  Please call answering service for on-call GI fellow ([phone]) after 5pm and before 8am, and on weekends.

## 2020-08-10 NOTE — PROGRESS NOTE ADULT - ATTENDING COMMENTS
Patient seen and examined. Agree with above. Patient's diarrhea is resolving, now soft stools. Would plan for discharge today - would touch base with ID regarding antibiotics regiment. Patient is seeking 2nd pathology opinion with regards to the finding of adenocarcinoma in the rectum. Patient states she cannnot tolerate mesalamine. No additional GI workup needed this admission, can follow-up as outpatient.

## 2020-08-10 NOTE — PROGRESS NOTE ADULT - PROBLEM SELECTOR PLAN 2
Unclear if this represents treatment failure or recurrence, given symptoms did not resolve fully after initial course of flagyl.  -Diarrhea improving with less cramping, abdominal pain, no blood in stool.   -Started on vanco PO 125mg q6h (8/2/2020, q6hour) and flagyl IV 500mg q8h (8/3/2020, q12 hours)  -Given complicated C.Diff (failure of treatment, high white count, pancolitis, WBC >30 ect), will continue patient on PO vancomycin plus IV flagyl.   -CT abd/pelvis showing pancolitis without lymphadenopathy.   -WBC improving 36->10.85, although bump to 12.48 today.   -Patient had one fever to 100.8 8/8 at 4am, will monitor closely, consider gram negative coverage if febrile/WBC continues to rise. Repeat blood/urine cx ordered, will f/u results.   -Trend BMP, replete lytes PRN. Will f/u CBC, lactate, monitor for toxic signs.   -Advanced diet to full with low residual. Patient tolerating well.    -No concern for complications including toxic megacolon given no distension or peritoneal signs on exam, lactate wnl  -Patient had Flex Sig 8/6 showing pseudomembranes c/w C. Diff and inflammation of sigmoid colon and rectum c/w UC. As per Gi, plan is to continue with abx for C. Diff and later discuss potential treatments for patient's UC. Will f/u on further GI recs. Unclear if this represents treatment failure or recurrence, given symptoms did not resolve fully after initial course of flagyl.  -Diarrhea improving with less cramping, abdominal pain, no blood in stool, stool is now well formed.   -Started on vanco PO 125mg q6h (8/2/2020, q6hour) and flagyl IV 500mg q8h (8/3/2020, q12 hours), will complete a 10 day course (8/11 last day)  -CT abd/pelvis showing pancolitis without lymphadenopathy.   -WBC improving 36--> now 10  -Trend BMP, replete lytes PRN. Will f/u CBC, lactate, monitor for toxic signs.   -Advanced diet to full with low residual. Patient tolerating well.    -Patient had Flex Sig 8/6 showing pseudomembranes c/w C. Diff and inflammation of sigmoid colon and rectum c/w UC. As per Gi, plan is to continue with abx for C. Diff and later discuss potential treatments for patient's UC. Will f/u on further GI recs.

## 2020-08-10 NOTE — PROGRESS NOTE ADULT - PROBLEM SELECTOR PLAN 1
Found on biopsies from flex sigmoidoscopy this admission  -Patient spoke with her PCP dr. Quinonez, who told patient she will get back to her regarding her opinion as to how she can best guide patient. Patient would like to take things one step at a time right now. Had her pathology report sent out to her the hospital of her son, who is a pathologist, for a second opinion. Would consider surgery, but wants more details first.     -Colorectal surgery following, note no acute surgical intervention at this time, and that patient should follow up with Dr. Baron outpatient for further guidance regarding her colorectal cancer and possible surgery.   - Appreciate GI recs  - plan for full colonoscopy in near future once able to (recovery from C.Diff).  - Will consider oncology consult once more details are known and patient is ready.   -Will consider CT chest to complete metastatic disease imaging, [facility]. Found on biopsies from flex sigmoidoscopy this admission  -Patient spoke with her PCP Dr. Quinonez, patient prefers to deal primarily w/ PCP for outpatient plans. Patient had pathology report sent out to her the hospital of her son, who is a pathologist, for a second opinion. Would consider surgery, but wants more details first.   -Colorectal surgery following, note no acute surgical intervention at this time, patient to f/u w/ Dr. Baron outpatient.  - Appreciate GI recs  -outpatient colonoscopy  -patient will follow with outpatient hem/onc   -Will consider CT chest to complete metastatic disease imaging, [facility]. Found on biopsies from flex sigmoidoscopy this admission  -f/u CT chest for metastatic disease   -Colorectal surgery following, note no acute surgical intervention at this time, patient to f/u w/ Dr. Baron outpatient.  - Appreciate GI recs  -outpatient colonoscopy  -patient will follow with outpatient hem/onc   -Patient spoke with her PCP Dr. Quinonez, patient prefers to deal primarily w/ PCP for outpatient plans. Patient had pathology report sent out to her the hospital of her son, who is a pathologist, for a second opinion. Would consider surgery, but wants more details first.

## 2020-08-10 NOTE — CHART NOTE - NSCHARTNOTEFT_GEN_A_CORE
Upon Nutritional Assessment by the Registered Dietitian your patient was determined to meet criteria / has evidence of the following diagnosis/diagnoses:          [ ]  Mild Protein Calorie Malnutrition        [ ]  Moderate Protein Calorie Malnutrition        [ ] Severe Protein Calorie Malnutrition        [ ] Unspecified Protein Calorie Malnutrition        [ ] Underweight / BMI <19        [ ] Morbid Obesity / BMI > 40      Findings as based on:  [x] Comprehensive nutrition assessment   [ ] Nutrition Focused Physical Exam  [ ] Other:       Nutrition Plan/Recommendations:    1) Continue current diet: Low Fiber. Monitor/adjust as needed.  2) Suggest multivitamin supplementation if no medical contraindications   3) Encouraged PO intake as tolerated, patient made aware RD remains available   4) Monitor PO intake, weight, labs, skin, GI status, diet         PROVIDER Section:     By signing this assessment you are acknowledging and agree with the diagnosis/diagnoses assigned by the Registered Dietitian    Comments: Upon Nutritional Assessment by the Registered Dietitian your patient was determined to meet criteria / has evidence of the following diagnosis/diagnoses:          [x]  Mild Protein Calorie Malnutrition        [ ]  Moderate Protein Calorie Malnutrition        [ ] Severe Protein Calorie Malnutrition        [ ] Unspecified Protein Calorie Malnutrition        [ ] Underweight / BMI <19        [ ] Morbid Obesity / BMI > 40      Findings as based on:  [x] Comprehensive nutrition assessment   [ ] Nutrition Focused Physical Exam  [ ] Other:       Nutrition Plan/Recommendations:    1) Continue current diet: Low Fiber. Monitor/adjust as needed.  2) Suggest multivitamin supplementation if no medical contraindications   3) Encouraged PO intake as tolerated, patient made aware RD remains available   4) Monitor PO intake, weight, labs, skin, GI status, diet         PROVIDER Section:     By signing this assessment you are acknowledging and agree with the diagnosis/diagnoses assigned by the Registered Dietitian    Comments:

## 2020-08-10 NOTE — PROGRESS NOTE ADULT - PROBLEM SELECTOR PLAN 10
-DVT: Lovenox  -Diet: Regular, low fiber/residue  -Dispo: pending medical optimization; As per PT, home w/ outpatient PT Transitions of Care Status:  1.  Name of PCP: Dr. Quinonez, patient to also follow with colorectal surgeon Dr. Baron, as well as outpatietn hem/onc.  2.  PCP Contacted on Admission: [X ] Y    [ ] N    3.  PCP contacted at Discharge: [ ] Y    [ ] N    [ ] N/A  4.  Post-Discharge Appointment Date and Location:  5.  Summary of Handoff given to PCP: Transitions of Care Status:  1.  Name of PCP: Dr. Quinonez, patient to also follow with colorectal surgeon Dr. Baron, as well as outpatient heme/onc.  2.  PCP Contacted on Admission: [X ] Y    [ ] N    3.  PCP contacted at Discharge: [ ] Y    [ ] N    [ ] N/A  4.  Post-Discharge Appointment Date and Location:  5.  Summary of Handoff given to PCP:

## 2020-08-10 NOTE — PROGRESS NOTE ADULT - PROBLEM SELECTOR PLAN 9
-C/w home synthroid -DVT: Lovenox  -Diet: Regular, low fiber/residue  -Dispo: pending medical optimization; As per PT, home w/ outpatient PT -DVT: Lovenox  -Diet: Regular, low fiber/residue; per nutrition patient with mild protein calorie malnutrition  -Dispo: pending medical optimization; As per PT, home w/ outpatient PT

## 2020-08-10 NOTE — DIETITIAN INITIAL EVALUATION ADULT. - ETIOLOGY
related to altered GI function in setting of C diff, new adenocarcinoma, pt with chronic disease (ulcerative colitis)

## 2020-08-10 NOTE — DIETITIAN INITIAL EVALUATION ADULT. - ADD RECOMMEND
1) Suggest multivitamin supplementation if no medical contraindications 2) Encouraged PO intake as tolerated, patient made aware RD remains available 3) Monitor PO intake, weight, labs, skin, GI status, diet 4) Malnutrition sticker placed, RD to follow-up as per protocol

## 2020-08-10 NOTE — PROGRESS NOTE ADULT - PROBLEM SELECTOR PLAN 3
-S/p IVF in ED  -Treatment of C. diff as above  -Bcx, GIcx negative on admission  -Patient had one fever to 100.8 8/8 at 4am, will monitor closely, consider gram negative coverage if febrile/WBC continues to rise. Repeat blood/urine cx ordered, will f/u results.   -hold antihypertensives.  -Lactate 3.9, will continue to trend. -now resolved   -s/p IVF in ED, abx tx for C diff, neg Bcx, UCx, GI Cx  -hold antihypertensives

## 2020-08-10 NOTE — DIETITIAN INITIAL EVALUATION ADULT. - REASON INDICATOR FOR ASSESSMENT
Pt seen for length of stay assessment. Source: comprehensive chart review, pt. Pt is an 85 yo female with PMH of ulcerative colitis, external hemorrhoids, SVT, hypothyroidism, HTN, HLD, who presented with sepsis secondary to C diff colitis. 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

## 2020-08-10 NOTE — PROGRESS NOTE ADULT - SUBJECTIVE AND OBJECTIVE BOX
Chief Complaint:  Patient is a 86y old  Female who presents with a chief complaint of 86F sent in by GI for persistent diarrhea (10 Aug 2020 07:21)      Interval Events: No acute events overnight. Patient states she was able to go 12 hours with BM. Reports the severity of BM has also decreased. Denies melena, hematochezia, n/v, abdominal pain.     Allergies:  sulfa drugs (Unknown)      Hospital Medications:  acetaminophen   Tablet .. 650 milliGRAM(s) Oral every 6 hours PRN  atorvastatin 20 milliGRAM(s) Oral at bedtime  cholestyramine Powder (Sugar-Free) 4 Gram(s) Oral daily  enoxaparin Injectable 40 milliGRAM(s) SubCutaneous daily  levothyroxine 50 MICROGram(s) Oral daily  metroNIDAZOLE  IVPB 500 milliGRAM(s) IV Intermittent every 8 hours  sotalol 40 milliGRAM(s) Oral every 12 hours  vancomycin    Solution 125 milliGRAM(s) Oral every 6 hours      PMHX/PSHX:  Colitis  Atrial fibrillation with rapid ventricular response  Hypothyroid  SVT (supraventricular tachycardia)  Lipoma of neck  Atrial Tachycardia  Hypercholesteremia  Hypertension, Essential  Elective surgery  S/P tonsillectomy  S/P D&C (status post dilation and curettage)  H/O: Hysterectomy      Family history:  Family history of Parkinson's disease (Sibling)  Family history of CVA  Family history of renal failure      ROS:     General:  No weight loss, fevers, chills, night sweats, fatigue   Eyes:  No vision changes  ENT:  No sore throat, pain, runny nose  CV:  No chest pain, palpitations, dizziness   Resp:  No SOB, cough, wheezing  GI:  See HPI  :  No burning with urination, hematuria  Muscle:  No pain, weakness  Neuro:  No weakness/tingling, memory problems  Psych:  No fatigue, insomnia, mood problems, depression  Heme:  No easy bruisability  Skin:  No rash, edema      PHYSICAL EXAM:     GENERAL:  Well developed, no distress  HEENT:  NC/AT,  conjunctivae clear, sclera anicteric  CHEST:  Full & symmetric excursion, no increased effort w/ respirations  HEART:  Regular rhythm & rate  ABDOMEN:  Soft, non-tender, non-distended  EXTREMITIES:  no LE  edema  SKIN:  No rash/erythema/ecchymoses/petechiae/wounds/jaundice  NEURO:  Alert, oriented    Vital Signs:  Vital Signs Last 24 Hrs  T(C): 36.8 (10 Aug 2020 04:24), Max: 37 (09 Aug 2020 22:17)  T(F): 98.2 (10 Aug 2020 04:24), Max: 98.6 (09 Aug 2020 22:17)  HR: 71 (10 Aug 2020 04:24) (71 - 82)  BP: 116/72 (10 Aug 2020 04:) (114/67 - 150/85)  BP(mean): --  RR: 18 (10 Aug 2020 04:24) (16 - 18)  SpO2: 95% (10 Aug 2020 04:24) (95% - 96%)  Daily     Daily     LABS:                        9.3    12.48 )-----------( 456      ( 09 Aug 2020 09:53 )             29.8     08-    140  |  102  |  11  ----------------------------<  183<H>  3.5   |  21<L>  |  0.71    Ca    8.6      09 Aug 2020 09:53  Phos  3.3     08-  Mg     1.5     08-    TPro  6.0  /  Alb  3.1<L>  /  TBili  0.2  /  DBili  x   /  AST  18  /  ALT  12  /  AlkPhos  40  08-09    LIVER FUNCTIONS - ( 09 Aug 2020 09:53 )  Alb: 3.1 g/dL / Pro: 6.0 g/dL / ALK PHOS: 40 U/L / ALT: 12 U/L / AST: 18 U/L / GGT: x             Urinalysis Basic - ( 08 Aug 2020 18:21 )    Color: Yellow / Appearance: Clear / S.015 / pH: x  Gluc: x / Ketone: Trace  / Bili: Negative / Urobili: Negative   Blood: x / Protein: Trace / Nitrite: Negative   Leuk Esterase: Moderate / RBC: 2 /hpf / WBC 5 /HPF   Sq Epi: x / Non Sq Epi: 5 /hpf / Bacteria: Negative          Imaging: Chief Complaint:  Patient is a 86y old  Female who presents with a chief complaint of 86F sent in by GI for persistent diarrhea (10 Aug 2020 07:21)      Interval Events: No acute events overnight. Patient states she was able to go 12 hours with BM. Reports the severity of BM has also decreased. Stool is now like peanut butter no longer liquid. Denies melena, hematochezia, n/v, abdominal pain.     Allergies:  sulfa drugs (Unknown)      Hospital Medications:  acetaminophen   Tablet .. 650 milliGRAM(s) Oral every 6 hours PRN  atorvastatin 20 milliGRAM(s) Oral at bedtime  cholestyramine Powder (Sugar-Free) 4 Gram(s) Oral daily  enoxaparin Injectable 40 milliGRAM(s) SubCutaneous daily  levothyroxine 50 MICROGram(s) Oral daily  metroNIDAZOLE  IVPB 500 milliGRAM(s) IV Intermittent every 8 hours  sotalol 40 milliGRAM(s) Oral every 12 hours  vancomycin    Solution 125 milliGRAM(s) Oral every 6 hours      PMHX/PSHX:  Colitis  Atrial fibrillation with rapid ventricular response  Hypothyroid  SVT (supraventricular tachycardia)  Lipoma of neck  Atrial Tachycardia  Hypercholesteremia  Hypertension, Essential  Elective surgery  S/P tonsillectomy  S/P D&C (status post dilation and curettage)  H/O: Hysterectomy      Family history:  Family history of Parkinson's disease (Sibling)  Family history of CVA  Family history of renal failure      ROS:     General:  No weight loss, fevers, chills, night sweats, fatigue   Eyes:  No vision changes  ENT:  No sore throat, pain, runny nose  CV:  No chest pain, palpitations, dizziness   Resp:  No SOB, cough, wheezing  GI:  See HPI  :  No burning with urination, hematuria  Muscle:  No pain, weakness  Neuro:  No weakness/tingling, memory problems  Psych:  No fatigue, insomnia, mood problems, depression  Heme:  No easy bruisability  Skin:  No rash, edema      PHYSICAL EXAM:     GENERAL:  Well developed, no distress  HEENT:  NC/AT,  conjunctivae clear, sclera anicteric  CHEST:  Full & symmetric excursion, no increased effort w/ respirations  HEART:  Regular rhythm & rate  ABDOMEN:  Soft, non-tender, non-distended  EXTREMITIES:  no LE  edema  SKIN:  No rash/erythema/ecchymoses/petechiae/wounds/jaundice  NEURO:  Alert, oriented    Vital Signs:  Vital Signs Last 24 Hrs  T(C): 36.8 (10 Aug 2020 04:24), Max: 37 (09 Aug 2020 22:17)  T(F): 98.2 (10 Aug 2020 04:24), Max: 98.6 (09 Aug 2020 22:17)  HR: 71 (10 Aug 2020 04:24) (71 - 82)  BP: 116/72 (10 Aug 2020 04:24) (114/67 - 150/85)  BP(mean): --  RR: 18 (10 Aug 2020 04:24) (16 - 18)  SpO2: 95% (10 Aug 2020 04:24) (95% - 96%)  Daily     Daily     LABS:                        9.3    12.48 )-----------( 456      ( 09 Aug 2020 09:53 )             29.8     08-09    140  |  102  |  11  ----------------------------<  183<H>  3.5   |  21<L>  |  0.71    Ca    8.6      09 Aug 2020 09:53  Phos  3.3     08-09  Mg     1.5     08-09    TPro  6.0  /  Alb  3.1<L>  /  TBili  0.2  /  DBili  x   /  AST  18  /  ALT  12  /  AlkPhos  40  08-09    LIVER FUNCTIONS - ( 09 Aug 2020 09:53 )  Alb: 3.1 g/dL / Pro: 6.0 g/dL / ALK PHOS: 40 U/L / ALT: 12 U/L / AST: 18 U/L / GGT: x             Urinalysis Basic - ( 08 Aug 2020 18:21 )    Color: Yellow / Appearance: Clear / S.015 / pH: x  Gluc: x / Ketone: Trace  / Bili: Negative / Urobili: Negative   Blood: x / Protein: Trace / Nitrite: Negative   Leuk Esterase: Moderate / RBC: 2 /hpf / WBC 5 /HPF   Sq Epi: x / Non Sq Epi: 5 /hpf / Bacteria: Negative

## 2020-08-10 NOTE — DIETITIAN INITIAL EVALUATION ADULT. - PHYSICAL APPEARANCE
Pt declined nutrition-focused physical exam at this time. No overt signs of muscle or fat wasting observed at this time./other (specify) Ht: 60 inches (152.4 cm) Wt: 111.9 pounds (50.9 kg) (dosing/stated)  BMI: 21.9 kg/m2  IBW: 100 pounds +/-10% %IBW: 112%  Skin: no pressure injuries per flowsheets   Edema: no edema per flowsheets

## 2020-08-10 NOTE — PROGRESS NOTE ADULT - SUBJECTIVE AND OBJECTIVE BOX
PROGRESS NOTE:   Authored by Lainey Bryan MD, PGY1 [facility] pager [phone]    86y old  woman who presents with a chief complaint of persistent diarrhea     SUBJECTIVE / OVERNIGHT EVENTS: No acute events overnight. Patient notes she went from 7PM-7AM without having a bowel movement last night. Had one episode of diarrhea so far this morning, but notes it is less liquidy than previous bowel movements. Denies any abdominal pain or cramping with bowel movements. Notes some mild pain on her left side. Patient notes that she would like to take things slowly as far as decision-making going forward, and mainly deal with her C.Diff for now. Denies fevers, chills, SOB, chest pain, nausea or vomiting, blood in stool, dysuria, or leg pain. Notes that she feels a bit deconditioned as far as physical strength is concerned and would like to work with PT.    REVIEW OF SYSTEMS:    CONSTITUTIONAL: No weakness, fevers or chills  EYES/ENT: No visual changes;  No vertigo or throat pain   NECK: No pain or stiffness  RESPIRATORY: No cough, wheezing, hemoptysis; No shortness of breath  CARDIOVASCULAR: No chest pain or palpitations  GASTROINTESTINAL: +Diarrhea. No abdominal or epigastric pain. No nausea, vomiting, or hematemesis; constipation. No melena or hematochezia.  GENITOURINARY: No dysuria, frequency or hematuria  NEUROLOGICAL: No numbness or weakness  SKIN: No itching, rashes    MEDICATIONS  (STANDING):  atorvastatin 20 milliGRAM(s) Oral at bedtime  cholestyramine Powder (Sugar-Free) 4 Gram(s) Oral daily  enoxaparin Injectable 40 milliGRAM(s) SubCutaneous daily  levothyroxine 50 MICROGram(s) Oral daily  metroNIDAZOLE  IVPB 500 milliGRAM(s) IV Intermittent every 8 hours  sotalol 40 milliGRAM(s) Oral every 12 hours  vancomycin    Solution 125 milliGRAM(s) Oral every 6 hours    MEDICATIONS  (PRN):  acetaminophen   Tablet .. 650 milliGRAM(s) Oral every 6 hours PRN Temp greater or equal to 38C (100.4F), Mild Pain (1 - 3)        PHYSICAL EXAM:  Vital Signs Last 24 Hrs  T(C): 36.8 (10 Aug 2020 04:24), Max: 37 (09 Aug 2020 22:17)  T(F): 98.2 (10 Aug 2020 04:24), Max: 98.6 (09 Aug 2020 22:17)  HR: 71 (10 Aug 2020 04:24) (71 - 82)  BP: 116/72 (10 Aug 2020 04:24) (114/67 - 150/85)  BP(mean): --  RR: 18 (10 Aug 2020 04:24) (16 - 18)  SpO2: 95% (10 Aug 2020 04:24) (95% - 96%)    CONSTITUTIONAL: NAD, well-developed  RESPIRATORY: Normal respiratory effort; lungs are clear to auscultation bilaterally  CARDIOVASCULAR: Regular rate and rhythm, normal S1 and S2, no murmur/rub/gallop; Peripheral pulses are 2+ bilaterally  ABDOMEN: Tenderness to palpation in LLQ without rebound or guarding. normoactive bowel sounds. No hepatosplenomegaly  MUSCLOSKELETAL: no clubbing or cyanosis of digits; no joint swelling or tenderness to palpation. 2+ edema in b/l lower extremities, worse on left.   PSYCH: A+O to person, place, and time; affect appropriate  NEURO: Non-focal, no tremors  SKIN: No rashes    LABS:                        9.1    10.85 )-----------( 418      ( 08 Aug 2020 10:42 )             29.0     08-08    141  |  104  |  14  ----------------------------<  206<H>  3.8   |  19<L>  |  0.72    Ca    8.4      08 Aug 2020 10:42  Phos  2.8     08-08  Mg     1.5     08-08            Urinalysis Basic - ( 08 Aug 2020 18:21 )    Color: Yellow / Appearance: Clear / S.015 / pH: x  Gluc: x / Ketone: Trace  / Bili: Negative / Urobili: Negative   Blood: x / Protein: Trace / Nitrite: Negative   Leuk Esterase: Moderate / RBC: 2 /hpf / WBC 5 /HPF   Sq Epi: x / Non Sq Epi: 5 /hpf / Bacteria: Negative    COVID-19 PCR: NotDetec          RADIOLOGY & ADDITIONAL TESTS:  No new imaging or tests    COORDINATION OF CARE:  Care Discussed with Consultants/Other Providers [Y/N]:  Prior or Outpatient Records Reviewed [Y/N]: PROGRESS NOTE:   Authored by Lainey Bryan MD, PGY1 [facility] pager [phone]    86y old  woman who presents with a chief complaint of persistent diarrhea     SUBJECTIVE / OVERNIGHT EVENTS: No acute events overnight. Per patient's nurse, patient's stool improving, stool is now well formed, no diarrhea, less frequent. Patient was seen by PT yesterday, was able to ambulate. Patient not seen in AM d/t CT scan.  REVIEW OF SYSTEMS:    CONSTITUTIONAL: No weakness, fevers or chills  EYES/ENT: No visual changes;  No vertigo or throat pain   NECK: No pain or stiffness  RESPIRATORY: No cough, wheezing, hemoptysis; No shortness of breath  CARDIOVASCULAR: No chest pain or palpitations  GASTROINTESTINAL: +Diarrhea. No abdominal or epigastric pain. No nausea, vomiting, or hematemesis; constipation. No melena or hematochezia.  GENITOURINARY: No dysuria, frequency or hematuria  NEUROLOGICAL: No numbness or weakness  SKIN: No itching, rashes    MEDICATIONS  (STANDING):  atorvastatin 20 milliGRAM(s) Oral at bedtime  cholestyramine Powder (Sugar-Free) 4 Gram(s) Oral daily  enoxaparin Injectable 40 milliGRAM(s) SubCutaneous daily  levothyroxine 50 MICROGram(s) Oral daily  metroNIDAZOLE  IVPB 500 milliGRAM(s) IV Intermittent every 8 hours  sotalol 40 milliGRAM(s) Oral every 12 hours  vancomycin    Solution 125 milliGRAM(s) Oral every 6 hours    MEDICATIONS  (PRN):  acetaminophen   Tablet .. 650 milliGRAM(s) Oral every 6 hours PRN Temp greater or equal to 38C (100.4F), Mild Pain (1 - 3)        PHYSICAL EXAM:  Vital Signs Last 24 Hrs  T(C): 36.8 (10 Aug 2020 04:24), Max: 37 (09 Aug 2020 22:17)  T(F): 98.2 (10 Aug 2020 04:24), Max: 98.6 (09 Aug 2020 22:17)  HR: 71 (10 Aug 2020 04:24) (71 - 82)  BP: 116/72 (10 Aug 2020 04:24) (114/67 - 150/85)  BP(mean): --  RR: 18 (10 Aug 2020 04:24) (16 - 18)  SpO2: 95% (10 Aug 2020 04:24) (95% - 96%)    CONSTITUTIONAL: NAD, well-developed  RESPIRATORY: Normal respiratory effort; lungs are clear to auscultation bilaterally  CARDIOVASCULAR: Regular rate and rhythm, normal S1 and S2, no murmur/rub/gallop; Peripheral pulses are 2+ bilaterally  ABDOMEN: Tenderness to palpation in LLQ without rebound or guarding. normoactive bowel sounds. No hepatosplenomegaly  MUSCLOSKELETAL: no clubbing or cyanosis of digits; no joint swelling or tenderness to palpation. 2+ edema in b/l lower extremities, worse on left.   PSYCH: A+O to person, place, and time; affect appropriate  NEURO: Non-focal, no tremors  SKIN: No rashes    LABS:                                   8.8    10.09 )-----------( 495      ( 10 Aug 2020 08:22 )             28.0     08-10    142  |  106  |  15  ----------------------------<  103<H>  3.7   |  23  |  0.72    Ca    8.8      10 Aug 2020 08:22  Phos  3.5     08-10  Mg     1.5     08-10    TPro  6.0  /  Alb  3.1<L>  /  TBili  0.2  /  DBili  x   /  AST  18  /  ALT  12  /  AlkPhos  40  08-09            Urinalysis Basic - ( 08 Aug 2020 18:21 )    Color: Yellow / Appearance: Clear / S.015 / pH: x  Gluc: x / Ketone: Trace  / Bili: Negative / Urobili: Negative   Blood: x / Protein: Trace / Nitrite: Negative   Leuk Esterase: Moderate / RBC: 2 /hpf / WBC 5 /HPF   Sq Epi: x / Non Sq Epi: 5 /hpf / Bacteria: Negative    COVID-19 PCR: NotDetec          RADIOLOGY & ADDITIONAL TESTS:  No new imaging or tests    COORDINATION OF CARE:  Care Discussed with Consultants/Other Providers [Y/N]:  Prior or Outpatient Records Reviewed [Y/N]: PROGRESS NOTE:   Authored by Lainey Bryan MD, PGY1 [facility] pager [phone]    86y old  woman who presents with a chief complaint of persistent diarrhea     SUBJECTIVE / OVERNIGHT EVENTS: No acute events overnight. Per patient's nurse, patient's stool improving, stool is now well formed, no diarrhea, less frequent. Patient was seen by PT yesterday, was able to ambulate. Patient not seen in AM d/t CT scan. Patient refusing cholestyramine.     REVIEW OF SYSTEMS:    CONSTITUTIONAL: No weakness, fevers or chills  EYES/ENT: No visual changes;  No vertigo or throat pain   NECK: No pain or stiffness  RESPIRATORY: No cough, wheezing, hemoptysis; No shortness of breath  CARDIOVASCULAR: No chest pain or palpitations  GASTROINTESTINAL: +Diarrhea. No abdominal or epigastric pain. No nausea, vomiting, or hematemesis; constipation. No melena or hematochezia.  GENITOURINARY: No dysuria, frequency or hematuria  NEUROLOGICAL: No numbness or weakness  SKIN: No itching, rashes    MEDICATIONS  (STANDING):  atorvastatin 20 milliGRAM(s) Oral at bedtime  cholestyramine Powder (Sugar-Free) 4 Gram(s) Oral daily  enoxaparin Injectable 40 milliGRAM(s) SubCutaneous daily  levothyroxine 50 MICROGram(s) Oral daily  metroNIDAZOLE  IVPB 500 milliGRAM(s) IV Intermittent every 8 hours  sotalol 40 milliGRAM(s) Oral every 12 hours  vancomycin    Solution 125 milliGRAM(s) Oral every 6 hours    MEDICATIONS  (PRN):  acetaminophen   Tablet .. 650 milliGRAM(s) Oral every 6 hours PRN Temp greater or equal to 38C (100.4F), Mild Pain (1 - 3)        PHYSICAL EXAM:  Vital Signs Last 24 Hrs  T(C): 36.8 (10 Aug 2020 04:24), Max: 37 (09 Aug 2020 22:17)  T(F): 98.2 (10 Aug 2020 04:24), Max: 98.6 (09 Aug 2020 22:17)  HR: 71 (10 Aug 2020 04:24) (71 - 82)  BP: 116/72 (10 Aug 2020 04:24) (114/67 - 150/85)  BP(mean): --  RR: 18 (10 Aug 2020 04:24) (16 - 18)  SpO2: 95% (10 Aug 2020 04:24) (95% - 96%)    CONSTITUTIONAL: NAD, well-developed  RESPIRATORY: Normal respiratory effort; lungs are clear to auscultation bilaterally  CARDIOVASCULAR: Regular rate and rhythm, normal S1 and S2, no murmur/rub/gallop; Peripheral pulses are 2+ bilaterally  ABDOMEN: Tenderness to palpation in LLQ without rebound or guarding. normoactive bowel sounds. No hepatosplenomegaly  MUSCLOSKELETAL: no clubbing or cyanosis of digits; no joint swelling or tenderness to palpation. 2+ edema in b/l lower extremities, worse on left.   PSYCH: A+O to person, place, and time; affect appropriate  NEURO: Non-focal, no tremors  SKIN: No rashes    LABS:                                   8.8    10.09 )-----------( 495      ( 10 Aug 2020 08:22 )             28.0     08-10    142  |  106  |  15  ----------------------------<  103<H>  3.7   |  23  |  0.72    Ca    8.8      10 Aug 2020 08:22  Phos  3.5     08-10  Mg     1.5     08-10    TPro  6.0  /  Alb  3.1<L>  /  TBili  0.2  /  DBili  x   /  AST  18  /  ALT  12  /  AlkPhos  40  08-09            Urinalysis Basic - ( 08 Aug 2020 18:21 )    Color: Yellow / Appearance: Clear / S.015 / pH: x  Gluc: x / Ketone: Trace  / Bili: Negative / Urobili: Negative   Blood: x / Protein: Trace / Nitrite: Negative   Leuk Esterase: Moderate / RBC: 2 /hpf / WBC 5 /HPF   Sq Epi: x / Non Sq Epi: 5 /hpf / Bacteria: Negative    COVID-19 PCR: NotDetec          RADIOLOGY & ADDITIONAL TESTS:  No new imaging or tests    COORDINATION OF CARE:  Care Discussed with Consultants/Other Providers [Y/N]:  Prior or Outpatient Records Reviewed [Y/N]: PROGRESS NOTE:   Authored by Lainey Bryan MD, PGY1 [facility] pager [phone]    86y old  woman who presents with a chief complaint of persistent diarrhea     SUBJECTIVE / OVERNIGHT EVENTS: No acute events overnight. Per patient's nurse, patient's stool improving, stool is now well formed, less frequent. Patient was seen by PT yesterday, was able to ambulate. Patient refusing cholestyramine.  Patient unable to be seen in AM (undergoing CT scan).      REVIEW OF SYSTEMS:  Limited, per patients nurse, patient denies nausea, vomiting, hematemesis, melena or hematochezia, no fever or chills.     MEDICATIONS  (STANDING):  atorvastatin 20 milliGRAM(s) Oral at bedtime  cholestyramine Powder (Sugar-Free) 4 Gram(s) Oral daily  enoxaparin Injectable 40 milliGRAM(s) SubCutaneous daily  levothyroxine 50 MICROGram(s) Oral daily  metroNIDAZOLE  IVPB 500 milliGRAM(s) IV Intermittent every 8 hours  sotalol 40 milliGRAM(s) Oral every 12 hours  vancomycin    Solution 125 milliGRAM(s) Oral every 6 hours    MEDICATIONS  (PRN):  acetaminophen   Tablet .. 650 milliGRAM(s) Oral every 6 hours PRN Temp greater or equal to 38C (100.4F), Mild Pain (1 - 3)        PHYSICAL EXAM:  Vital Signs Last 24 Hrs  T(C): 36.8 (10 Aug 2020 04:24), Max: 37 (09 Aug 2020 22:17)  T(F): 98.2 (10 Aug 2020 04:24), Max: 98.6 (09 Aug 2020 22:17)  HR: 71 (10 Aug 2020 04:24) (71 - 82)  BP: 116/72 (10 Aug 2020 04:24) (114/67 - 150/85)  BP(mean): --  RR: 18 (10 Aug 2020 04:24) (16 - 18)  SpO2: 95% (10 Aug 2020 04:24) (95% - 96%)    Unable to obtain, will obtain in PM.       LABS:                   8.8    10.09 )-----------( 495      ( 10 Aug 2020 08:22 )             28.0     08-10    142  |  106  |  15  ----------------------------<  103<H>  3.7   |  23  |  0.72    Ca    8.8      10 Aug 2020 08:22  Phos  3.5     08-10  Mg     1.5     08-10    TPro  6.0  /  Alb  3.1<L>  /  TBili  0.2  /  DBili  x   /  AST  18  /  ALT  12  /  AlkPhos  40  08-09            Urinalysis Basic - ( 08 Aug 2020 18:21 )    Color: Yellow / Appearance: Clear / S.015 / pH: x  Gluc: x / Ketone: Trace  / Bili: Negative / Urobili: Negative   Blood: x / Protein: Trace / Nitrite: Negative   Leuk Esterase: Moderate / RBC: 2 /hpf / WBC 5 /HPF   Sq Epi: x / Non Sq Epi: 5 /hpf / Bacteria: Negative    COVID-19 PCR: NotDetec          RADIOLOGY & ADDITIONAL TESTS:  No new imaging or tests    COORDINATION OF CARE:  Care Discussed with Consultants/Other Providers [Y/N]:  Prior or Outpatient Records Reviewed [Y/N]:

## 2020-08-10 NOTE — PROGRESS NOTE ADULT - ASSESSMENT
85 f with HTN, HLD, SVT on sotalol, external hemorrhoids, UC was in remission until a year ago and was started on budesonide and tapered, then was recently admitted for UC flare and bloody diarrhea, was given steroids and again at home had bloody diarrhea, C-diff was checked 7/15 and was positive, she was given flagyl and the bloody diarrhea resolved but she still had diarrhea, now p/w fever and worsening diarrhea  here febrile to 101.8, WBC: 36  abd/pelvis CT with pan colitis  C-diff positive    fever, leukocytosis, sepsis  severe C-diff in the setting of ulcerative colitis, first recurrence or not fully treated first episode (as the diarrhea never resolved)  * s/p flex sig yesterday and membranous colitis was noticed and biopsied, 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

## 2020-08-10 NOTE — PROGRESS NOTE ADULT - ASSESSMENT
86F PMH ulcerative colitis, external hemorrhoids, SVT on sotolol, hypothyroid, HTN, HLD, admitted 5/15 - 5/20/20 for UC flare, now presenting with sepsis 2/2 C. diff colitis after failure of outpatient antibiotics. Colon biopsy found colonic adenocarcinoma. 86F PMH ulcerative colitis, external hemorrhoids, SVT on sotolol, hypothyroid, HTN, HLD, admitted 5/15 - 5/20/20 for UC flare, now presenting with sepsis 2/2 C. diff colitis after failure of outpatient antibiotics, now improving. 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

## 2020-08-10 NOTE — PROGRESS NOTE ADULT - SUBJECTIVE AND OBJECTIVE BOX
Follow Up:  C-diff    Interval History: diarrhea has improved only one BM since last night and not watery    ROS:      All other systems negative    Constitutional: no fever, no chills  Cardiovascular:  no chest pain, no palpitation  Respiratory:  no SOB, no cough  GI:  no abd pain, no vomiting, improved diarrhea  urinary: no dysuria, no hematuria, no flank pain  skin:  no rash  neurology:  no headache, no seizure        Allergies  sulfa drugs (Unknown)        ANTIMICROBIALS:  metroNIDAZOLE  IVPB 500 every 8 hours  vancomycin    Solution 125 every 6 hours      OTHER MEDS:  acetaminophen   Tablet .. 650 milliGRAM(s) Oral every 6 hours PRN  atorvastatin 20 milliGRAM(s) Oral at bedtime  cholestyramine Powder (Sugar-Free) 4 Gram(s) Oral daily  enoxaparin Injectable 40 milliGRAM(s) SubCutaneous daily  levothyroxine 50 MICROGram(s) Oral daily  potassium chloride   Powder 40 milliEquivalent(s) Oral once  sotalol 40 milliGRAM(s) Oral every 12 hours      Vital Signs Last 24 Hrs  T(C): 37.2 (10 Aug 2020 14:09), Max: 37.2 (10 Aug 2020 14:09)  T(F): 99 (10 Aug 2020 14:09), Max: 99 (10 Aug 2020 14:09)  HR: 80 (10 Aug 2020 14:09) (71 - 80)  BP: 137/77 (10 Aug 2020 14:09) (114/67 - 137/77)  BP(mean): --  RR: 18 (10 Aug 2020 14:09) (18 - 18)  SpO2: 98% (10 Aug 2020 14:09) (95% - 98%)    Physical Exam:  General:    NAD,  non toxic, sitting in bed  Cardio:     regular S1, S2,  no murmur  Respiratory:    clear b/l,    no wheezing  abd:     soft,   BS +,   no tenderness  :   no CVAT,  no suprapubic tenderness,   no  walter  Musculoskeletal:   no joint swelling  vascular: no phlebitis  Skin:    no rash  Neurologic:     no focal deficit  psych: normal affect                          8.8    10.09 )-----------( 495      ( 10 Aug 2020 08:22 )             28.0       08-10    142  |  106  |  15  ----------------------------<  103<H>  3.7   |  23  |  0.72    Ca    8.8      10 Aug 2020 08:22  Phos  3.5     08-10  Mg     1.5     -10    TPro  6.0  /  Alb  3.1<L>  /  TBili  0.2  /  DBili  x   /  AST  18  /  ALT  12  /  AlkPhos  40        Urinalysis Basic - ( 08 Aug 2020 18:21 )    Color: Yellow / Appearance: Clear / S.015 / pH: x  Gluc: x / Ketone: Trace  / Bili: Negative / Urobili: Negative   Blood: x / Protein: Trace / Nitrite: Negative   Leuk Esterase: Moderate / RBC: 2 /hpf / WBC 5 /HPF   Sq Epi: x / Non Sq Epi: 5 /hpf / Bacteria: Negative        MICROBIOLOGY:  v  .Blood Blood-Peripheral  20   No growth to date.  --  --      .Stool Feces  20   GI PCR Results: NOT detected  *******Please Note:*******  GI panel PCR evaluates for:  Campylobacter, Plesiomonas shigelloides, Salmonella,  Vibrio, Yersinia enterocolitica, Enteroaggregative  Escherichia coli (EAEC), Enteropathogenic E.coli (EPEC),  Enterotoxigenic E. coli (ETEC) lt/st, Shiga-like  toxin-producing E. coli (STEC) stx1/stx2,  Shigella/ Enteroinvasive E. coli (EIEC), Cryptosporidium,  Cyclospora cayetanensis, Entamoeba histolytica,  Giardia lamblia, Adenovirus F 40/41, Astrovirus,  Norovirus GI/GII, Rotavirus A, Sapovirus  --  --      .Blood Blood-Peripheral  20   No Growth Final  --  --                RADIOLOGY:  Images below independently visualized and reviewed personally, findings as below  < from: Xray Hip w/ Pelvis 2 or 3 Views, Right (20 @ 09:47) >  IMPRESSION:  No acute displaced fracture or dislocation is seen in the right hip. Mild arthritic changes of the right hip. Left total hip arthroplasty, femoral component only partially visualized. No diastasis of the pubic symphysis or bilateral sacroiliac joints. Degenerative changes in the lower lumbar spine.      < from: CT Abdomen and Pelvis w/ IV Cont (20 @ 21:28) >  IMPRESSION:  Pancolitis which could be secondary to the patient's known diagnosis of ulcerative colitis or due to an infectious etiology.

## 2020-08-10 NOTE — PROGRESS NOTE ADULT - PROBLEM SELECTOR PLAN 4
Patient unsure exactly when this happened. She notes hitting side on wall when stumbling in rush to go to bathroom and also sitting awkwardly when getting on commode.   -Nontender to palpation without noticeable swelling, but pain with hip flexion or when changing positions. Overall pain improved today, relieved by tylenol.  -Negative hip Xray, no acute fractures.   -Fall precautions, bedpan in addition to bedside commode  -Heat packs and Tylenol PRN -started inpatient, patient notes hitting side on wall when stumbling in rush to go to bathroom and also sitting awkwardly when getting on commode.   -Nontender to palpation without noticeable swelling, but pain with hip flexion or when changing positions. Overall pain improved, relieved by tylenol.  -Negative hip Xray, no acute fractures.   -Fall precautions, bedpan in addition to bedside commode  -Heat packs and Tylenol PRN

## 2020-08-10 NOTE — DIETITIAN INITIAL EVALUATION ADULT. - PROBLEM SELECTOR PLAN 1
Unclear if this represents treatment failure or recurrence, given symptoms did not resolve fully after initial course of flagyl. Given leukocytosis near 30, progression of disease to include fever, reasonable to treat with both oral vanco and IV flagyl, although typically only vanco is used after treatment failure with flagyl  -C/w vanco 125mg q6h and flagyl 500mg q8h  -Trend BMP, replete lytes PRN  -DASH diet as tolerated  -No concern for complications including toxic megacolon given no distension or peritoneal signs on exam, lactate wnl  -GI consult emailed

## 2020-08-10 NOTE — DIETITIAN INITIAL EVALUATION ADULT. - OTHER INFO
Patient visited, reports fair appetite and PO intake. Endorses consuming ~50% of lunch today (pasta) and almost 100% of breakfast this morning. Reports consuming ~50% of burger julio for dinner last night. Denies nausea/vomiting. Reports diarrhea is improving, last BM today (8/10) per flowsheets/patient. Denies difficulties chewing or swallowing. Confirmed NKFA.     Patient reports good appetite and PO intake PTA with no recent changes. Denies following any dietary restrictions PTA, reports eating "healthily." Unwilling to provide full diet recall at this time. Endorses supplementing with vitamin D, biotin, and a multivitamin PTA. Reports recent weight loss of ~12 pounds since March in setting of "Covid stress." Reports eating habits/appetite did not change, that it is typical for her to lose weight when stressed even when eating the same. Noted per H&P, pt with diarrhea x 2.5 months. UBW ~124 pounds, reports losing weight to 112 pounds PTA. Reported weight history indicates ~9.7% weight loss x ~5 months. Dosing weight 111.9 pounds consistent with reported weight history. Noted 8/5 standing weight of 121 pounds, pt believes this weight is inaccurate.     Encouraged PO intake as tolerated with emphasis on protein. Discussed availability of oral nutrition supplements, pt declined all options at this time. Stressed the importance of adequate nutrition and calorie and protein intake. Patient with no food preferences at this time. Made aware of RD availability.

## 2020-08-11 NOTE — DISCHARGE NOTE NURSING/CASE MANAGEMENT/SOCIAL WORK - PATIENT PORTAL LINK FT
You can access the FollowMyHealth Patient Portal offered by [facility] by registering at the following website: [email or web address]. By joining [employer]’s FollowMyHealth portal, you will also be able to view your health information using other applications (apps) compatible with our system.

## 2020-08-11 NOTE — PROGRESS NOTE ADULT - SUBJECTIVE AND OBJECTIVE BOX
Follow Up:  C-diff    Interval History: improved frequency of diarrhea also not watery    ROS:      All other systems negative    Constitutional: no fever, no chills  Cardiovascular:  no chest pain, no palpitation  Respiratory:  no SOB, no cough  GI:  no abd pain, no vomiting, improved diarrhea  urinary: no dysuria, no hematuria, no flank pain  skin:  no rash  neurology:  no headache, no seizure        Allergies  sulfa drugs (Unknown)        ANTIMICROBIALS:  vancomycin    Solution 125 every 6 hours      OTHER MEDS:  acetaminophen   Tablet .. 650 milliGRAM(s) Oral every 6 hours PRN  atorvastatin 20 milliGRAM(s) Oral at bedtime  cholestyramine Powder (Sugar-Free) 4 Gram(s) Oral daily  enoxaparin Injectable 40 milliGRAM(s) SubCutaneous daily  levothyroxine 50 MICROGram(s) Oral daily  potassium chloride    Tablet ER 40 milliEquivalent(s) Oral every 4 hours  sotalol 40 milliGRAM(s) Oral every 12 hours      Vital Signs Last 24 Hrs  T(C): 37 (11 Aug 2020 05:49), Max: 37.2 (10 Aug 2020 14:09)  T(F): 98.6 (11 Aug 2020 05:49), Max: 99 (10 Aug 2020 14:09)  HR: 76 (11 Aug 2020 05:49) (76 - 80)  BP: 127/70 (11 Aug 2020 05:49) (111/67 - 137/77)  BP(mean): --  RR: 18 (11 Aug 2020 05:49) (18 - 18)  SpO2: 96% (11 Aug 2020 05:49) (95% - 98%)    Physical Exam:  General:    NAD,  non toxic, sitting in bed  Cardio:     regular S1, S2,  no murmur  Respiratory:    clear b/l,    no wheezing  abd:     soft,   BS +,   no tenderness  :   no CVAT,  no suprapubic tenderness,   no  walter  Musculoskeletal:   no joint swelling  vascular: no phlebitis  Skin:    no rash  Neurologic:     no focal deficit  psych: normal affect                            9.2    10.30 )-----------( 506      ( 11 Aug 2020 09:55 )             30.1       08-11    140  |  101  |  14  ----------------------------<  175<H>  3.2<L>   |  25  |  0.73    Ca    8.7      11 Aug 2020 09:56  Phos  2.5     08-11  Mg     2.0     08-11    TPro  6.0  /  Alb  3.2<L>  /  TBili  0.1<L>  /  DBili  x   /  AST  17  /  ALT  9<L>  /  AlkPhos  41  08-11          MICROBIOLOGY:  v  .Blood Blood-Peripheral  08-08-20   No growth to date.  --  --      .Stool Feces  08-03-20   GI PCR Results: NOT detected    .Blood Blood-Peripheral  08-02-20   No Growth Final  --  --                RADIOLOGY:  Images below independently visualized and reviewed personally, findings as below  < from: CT Chest w/ IV Cont (08.10.20 @ 08:49) >  IMPRESSION:    Clear lungs.      < from: CT Abdomen and Pelvis w/ IV Cont (08.02.20 @ 21:28) >    IMPRESSION:  Pancolitis which could be secondary to the patient's known diagnosis of ulcerative colitis or due to an infectious etiology.      < from: Flexible Sigmoidoscopy (08.06.20 @ 09:58) >                                                                                                        Impression:          - Non-thrombosed external hemorrhoids found on perianal exam.                       - Pseudomembranous enterocolitis, consistent with diagnosed c diff.                       - Inflammation was found in the sigmoid colon secondary to ulcerative                        colitis. Biopsied.

## 2020-08-11 NOTE — PROGRESS NOTE ADULT - REASON FOR ADMISSION
86F sent in by GI for persistent diarrhea
c diff
86F sent in by GI for persistent diarrhea

## 2020-08-11 NOTE — PROGRESS NOTE ADULT - ASSESSMENT
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    - no indication for urgent surgical intervention  - care and dispo per GI team  - discussed with patient the plan to follow up as an outpatient with Dr. HARRIS Baron to further discuss management of her UC in the setting of adenoca, patient has contact information for office for further questions or concerns    please contact Red Team Surgery ([phone]) with further questions or concerns,  --TUSHAR Melgar, colorectal surgery fellow

## 2020-08-11 NOTE — PROGRESS NOTE ADULT - ASSESSMENT
85 f with HTN, HLD, SVT on sotalol, external hemorrhoids, UC was in remission until a year ago and was started on budesonide and tapered, then was recently admitted for UC flare and bloody diarrhea, was given steroids and again at home had bloody diarrhea, C-diff was checked 7/15 and was positive, she was given flagyl and the bloody diarrhea resolved but she still had diarrhea, now p/w fever and worsening diarrhea  here febrile to 101.8, WBC: 36  abd/pelvis CT with pan colitis  C-diff positive    fever, leukocytosis, sepsis  severe C-diff in the setting of ulcerative colitis, first recurrence or not fully treated first episode (as the diarrhea never resolved)  * s/p flex sig yesterday and membranous colitis was noticed and biopsied, now came back with colonic adenocarcinoma   * s/p 10 days of flagyl and vanco 125 q 6  * will need a tapering course of vanco, c/w vanco 125 tid for one week, then 125 bid for 1 week, then 125 daily for 1 week, then 125 every other day for 1 week, then 125 every 2-3 days for 2 weeks and stop  * f/u with GI and colorectal     The above assessment and plan was discussed with the medicine resident    Ana Barrow MD  Pager [phone]  After 5pm and on weekends call [phone]

## 2020-08-11 NOTE — PROGRESS NOTE ADULT - SUBJECTIVE AND OBJECTIVE BOX
Interval Events: Patient now on PO vanc only, IV flagyl stopped.    S: Patient doing well, denies fevers, chills, nausea, emesis, chest pain, SOB.    O: Vital Signs  T(C): 37 (08-11 @ 05:49), Max: 37.2 (08-10 @ 14:09)  HR: 76 (08-11 @ 05:49) (76 - 80)  BP: 127/70 (08-11 @ 05:49) (111/67 - 137/77)  RR: 18 (08-11 @ 05:49) (18 - 18)  SpO2: 96% (08-11 @ 05:49) (95% - 98%)    General: alert and oriented, NAD  Resp: airway patent, respirations unlabored  Abdomen: soft, nontender, nondistended  Extremities: no edema  Skin: warm, dry, appropriate color                          8.8    10.09 )-----------( 495      ( 10 Aug 2020 08:22 )             28.0   08-10    142  |  106  |  15  ----------------------------<  103<H>  3.7   |  23  |  0.72    Ca    8.8      10 Aug 2020 08:22  Phos  3.5     08-10  Mg     1.5     08-10    TPro  6.0  /  Alb  3.1<L>  /  TBili  0.2  /  DBili  x   /  AST  18  /  ALT  12  /  AlkPhos  40  08-09

## 2020-08-11 NOTE — PROGRESS NOTE ADULT - ATTENDING COMMENTS
Patient seen and examined. Plan as discussed w/ Dr. Bryan: Cdiff/diarrhea continues to improve, patient will be discharged off of Flagyl, and continued on pulse dose PO Vancomycin taper. Plan for f/u w/ ID and PMD upon discharge, will send patient w/ KCL supplementation given diarrhea and intermitted hypokalemia, dosed at 20mEq daily x1 week until repeat labs can be drawn. Patient instructed to f/u with colorectal surgery for further management of both rectal prolapse and identified pathologic diagnosis of colon adenoCa per biopsy taken during recent sigmoidoscopy. Patient's questions were answered and she is amenable to discharge home today. Total discharge planning time spent = 45minutes.

## 2020-08-11 NOTE — PROGRESS NOTE ADULT - PROBLEM SELECTOR PLAN 10
Transitions of Care Status:  1.  Name of PCP: Dr. Quinonez, patient to also follow with colorectal surgeon Dr. Baron, as well as outpatient heme/onc.  2.  PCP Contacted on Admission: [X ] Y    [ ] N    3.  PCP contacted at Discharge: [ ] Y    [ ] N    [ ] N/A  4.  Post-Discharge Appointment Date and Location:  5.  Summary of Handoff given to PCP: Transitions of Care Status:  1.  Name of PCP: Dr. Quinonez, patient to also follow with colorectal surgeon Dr. Baron, as well as outpatient heme/onc.  2.  PCP Contacted on Admission: [X ] Y    [ ] N    3.  PCP contacted at Discharge: [X] Y    [ ] N    [ ] N/A (waiting for call back from PCP)  4.  Post-Discharge Appointment Date and Location:  5.  Summary of Handoff given to PCP:

## 2020-08-11 NOTE — PROGRESS NOTE ADULT - PROBLEM SELECTOR PLAN 2
Unclear if this represents treatment failure or recurrence, given symptoms did not resolve fully after initial course of flagyl.  -Diarrhea improving with less cramping, abdominal pain, no blood in stool, stool is now well formed.   -Started on vanco PO 125mg q6h (8/2/2020, q6hour) and flagyl IV 500mg q8h (8/3/2020, q12 hours), will complete a 10 day course (8/11 last day)  -CT abd/pelvis showing pancolitis without lymphadenopathy.   -WBC improving 36--> now 10  -Trend BMP, replete lytes PRN. Will f/u CBC, lactate, monitor for toxic signs.   -Advanced diet to full with low residual. Patient tolerating well.    -Patient had Flex Sig 8/6 showing pseudomembranes c/w C. Diff and inflammation of sigmoid colon and rectum c/w UC. As per Gi, plan is to continue with abx for C. Diff and later discuss potential treatments for patient's UC. Will f/u on further GI recs. Unclear if this represents treatment failure or recurrence, given symptoms did not resolve fully after initial course of flagyl.  -Diarrhea improving with less cramping, abdominal pain, no blood in stool, stool is now well formed.   -Started on vanco PO 125mg q6h (8/2/2020, q6hour) and flagyl IV 500mg q8h (8/3/2020, q12 hours), will complete a 10 day course (8/11 last day)  -will be discharged on PO vancomycin taper per ID recs  -CT abd/pelvis showing pancolitis without lymphadenopathy.   -WBC improving 36--> now 10  -Trend BMP, replete lytes PRN. Will f/u CBC, lactate, monitor for toxic signs.   -Advanced diet to full with low residual. Patient tolerating well.    -Patient had Flex Sig 8/6 showing pseudomembranes c/w C. Diff and inflammation of sigmoid colon and rectum c/w UC. As per Gi, plan is to continue with abx for C. Diff and later discuss potential treatments for patient's UC. Will f/u on further GI recs.

## 2020-08-11 NOTE — PROGRESS NOTE ADULT - PROBLEM SELECTOR PLAN 4
-started inpatient, patient notes hitting side on wall when stumbling in rush to go to bathroom and also sitting awkwardly when getting on commode.   -Nontender to palpation without noticeable swelling, but pain with hip flexion or when changing positions. Overall pain improved, relieved by tylenol.  -Negative hip Xray, no acute fractures.   -Fall precautions, bedpan in addition to bedside commode  -Heat packs and Tylenol PRN -resolved  -patient notes hitting side on wall when stumbling in rush to go to bathroom and also sitting awkwardly when getting on commode.   -Overall pain improved, relieved by tylenol.  -Negative hip Xray, no acute fractures.   -Fall precautions, bedpan in addition to bedside commode  -Heat packs and Tylenol PRN

## 2020-08-11 NOTE — PROGRESS NOTE ADULT - ASSESSMENT
86F PMH ulcerative colitis, external hemorrhoids, SVT on sotolol, hypothyroid, HTN, HLD, admitted 5/15 - 5/20/20 for UC flare, now presenting with sepsis 2/2 C. diff colitis after failure of outpatient antibiotics, now improving. 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 86F PMH ulcerative colitis, external hemorrhoids, SVT on sotolol, hypothyroid, HTN, HLD, admitted 5/15 - 5/20/20 for UC flare, now presenting with sepsis 2/2 C. diff colitis after failure of outpatient antibiotics, now improving, completes 10 day course of flagyl/vanc today, will be discharged on PO vancomycin taper. Inpatient colon biopsy revealed colonic adenocarcinoma, patient will f/u with colorectal surgery and manage outpatient.

## 2020-08-11 NOTE — PROGRESS NOTE ADULT - PROBLEM SELECTOR PLAN 1
Found on biopsies from flex sigmoidoscopy this admission  -f/u CT chest for metastatic disease   -Colorectal surgery following, note no acute surgical intervention at this time, patient to f/u w/ Dr. Baron outpatient.  - Appreciate GI recs  -outpatient colonoscopy  -patient will follow with outpatient hem/onc   -Patient spoke with her PCP Dr. Quinonez, patient prefers to deal primarily w/ PCP for outpatient plans. Patient had pathology report sent out to her the hospital of her son, who is a pathologist, for a second opinion. Would consider surgery, but wants more details first.

## 2020-08-11 NOTE — PROGRESS NOTE ADULT - PROBLEM SELECTOR PLAN 9
-DVT: Lovenox  -Diet: Regular, low fiber/residue; per nutrition patient with mild protein calorie malnutrition  -Dispo: pending medical optimization; As per PT, home w/ outpatient PT

## 2020-08-11 NOTE — PROGRESS NOTE ADULT - PROVIDER SPECIALTY LIST ADULT
Colorectal Surgery
Colorectal Surgery
Gastroenterology
Infectious Disease
Internal Medicine
Gastroenterology
Infectious Disease
Internal Medicine

## 2020-08-11 NOTE — PROGRESS NOTE ADULT - SUBJECTIVE AND OBJECTIVE BOX
PROGRESS NOTE:   Authored by Lainey Bryan MD, PGY1 [facility] pager [phone]    86y old  woman who presents with a chief complaint of persistent diarrhea     SUBJECTIVE / OVERNIGHT EVENTS: No acute events overnight. Patient reports improved stool consistency, non-watery, non-explosive, still loose. Patient denies fevers, chills. Patient endorses scant amounts of bright red blood when making a bowel movement, which she has noticed for the past 3-4 days. Bowel movements are non-painful. She denies dizziness, lightheadedness, palpitations. Patient also endorses small papules on left and right upper thigh and left arm, non-itchy, non-painful. Patient has had this with previous course of flagyl.     REVIEW OF SYSTEMS:  CONSTITUTIONAL: No weakness, fevers or chills  EYES: No visual changes  ENT:  No vertigo, no throat pain   RESPIRATORY: No cough, wheezing, hemoptysis; No shortness of breath  CARDIOVASCULAR: No chest pain or palpitations  GASTROINTESTINAL: No abdominal or epigastric pain. No nausea, vomiting, or hematemesis; No diarrhea or constipation. No melena or hematochezia.  GENITOURINARY: No dysuria, frequency or hematuria  NEUROLOGICAL: No numbness, No HA  SKIN: No itching, rashes  MSK: no joint pain, no muscle pain  MEDICATIONS  (STANDING):  atorvastatin 20 milliGRAM(s) Oral at bedtime  cholestyramine Powder (Sugar-Free) 4 Gram(s) Oral daily  enoxaparin Injectable 40 milliGRAM(s) SubCutaneous daily  levothyroxine 50 MICROGram(s) Oral daily  metroNIDAZOLE  IVPB 500 milliGRAM(s) IV Intermittent every 8 hours  sotalol 40 milliGRAM(s) Oral every 12 hours  vancomycin    Solution 125 milliGRAM(s) Oral every 6 hours    MEDICATIONS  (PRN):  acetaminophen   Tablet .. 650 milliGRAM(s) Oral every 6 hours PRN Temp greater or equal to 38C (100.4F), Mild Pain (1 - 3)        PHYSICAL EXAM:  Vital Signs Last 24 Hrs  T(C): 36.8 (10 Aug 2020 04:24), Max: 37 (09 Aug 2020 22:17)  T(F): 98.2 (10 Aug 2020 04:24), Max: 98.6 (09 Aug 2020 22:17)  HR: 71 (10 Aug 2020 04:24) (71 - 82)  BP: 116/72 (10 Aug 2020 04:24) (114/67 - 150/85)  BP(mean): --  RR: 18 (10 Aug 2020 04:24) (16 - 18)  SpO2: 95% (10 Aug 2020 04:24) (95% - 96%)    Unable to obtain, will obtain in PM.       LABS:                   8.8    10. )-----------( 495      ( 10 Aug 2020 08:22 )             28.0     08-10    142  |  106  |  15  ----------------------------<  103<H>  3.7   |  23  |  0.72    Ca    8.8      10 Aug 2020 08:22  Phos  3.5     08-10  Mg     1.5     08-10    TPro  6.0  /  Alb  3.1<L>  /  TBili  0.2  /  DBili  x   /  AST  18  /  ALT  12  /  AlkPhos  40  08-09            Urinalysis Basic - ( 08 Aug 2020 18:21 )    Color: Yellow / Appearance: Clear / S.015 / pH: x  Gluc: x / Ketone: Trace  / Bili: Negative / Urobili: Negative   Blood: x / Protein: Trace / Nitrite: Negative   Leuk Esterase: Moderate / RBC: 2 /hpf / WBC 5 /HPF   Sq Epi: x / Non Sq Epi: 5 /hpf / Bacteria: Negative    COVID-19 PCR: NotDetec          RADIOLOGY & ADDITIONAL TESTS:  No new imaging or tests    COORDINATION OF CARE:  Care Discussed with Consultants/Other Providers [Y/N]:  Prior or Outpatient Records Reviewed [Y/N]: PROGRESS NOTE:   Authored by Lainey Bryan MD, PGY1 [facility] pager [phone]    86y old  woman who presents with a chief complaint of persistent diarrhea     SUBJECTIVE / OVERNIGHT EVENTS: No acute events overnight. Patient reports improved stool consistency, non-watery, non-explosive, still loose. Patient denies fevers, chills. Patient endorses scant amounts of bright red blood when making a bowel movement, which she has noticed for the past 3-4 days, and attributes it to previously diagnosed rectal prolapse. Bowel movements are non-painful. She denies dizziness, lightheadedness, palpitations. Patient also endorses three small papules on left and right upper thigh and left arm, non-itchy, non-painful. Patient has had this with previous course of flagyl.     REVIEW OF SYSTEMS:  CONSTITUTIONAL: No weakness, fevers or chills  EYES: No visual changes  ENT:  No vertigo, no throat pain   RESPIRATORY: No cough, wheezing, hemoptysis; No shortness of breath  CARDIOVASCULAR: No chest pain or palpitations  GASTROINTESTINAL: No abdominal or epigastric pain. No nausea, vomiting, or hematemesis; No diarrhea or constipation. No melena or hematochezia.  GENITOURINARY: No dysuria, frequency or hematuria  NEUROLOGICAL: No numbness, No HA  SKIN: No itching, rashes  MSK: no joint pain, no muscle pain      MEDICATIONS  (STANDING):  atorvastatin 20 milliGRAM(s) Oral at bedtime  cholestyramine Powder (Sugar-Free) 4 Gram(s) Oral daily  enoxaparin Injectable 40 milliGRAM(s) SubCutaneous daily  levothyroxine 50 MICROGram(s) Oral daily  metroNIDAZOLE  IVPB 500 milliGRAM(s) IV Intermittent every 8 hours  sotalol 40 milliGRAM(s) Oral every 12 hours  vancomycin    Solution 125 milliGRAM(s) Oral every 6 hours    MEDICATIONS  (PRN):  acetaminophen   Tablet .. 650 milliGRAM(s) Oral every 6 hours PRN Temp greater or equal to 38C (100.4F), Mild Pain (1 - 3)        PHYSICAL EXAM:  Vital Signs Last 24 Hrs  T(C): 36.8 (10 Aug 2020 04:24), Max: 37 (09 Aug 2020 22:17)  T(F): 98.2 (10 Aug 2020 04:24), Max: 98.6 (09 Aug 2020 22:17)  HR: 71 (10 Aug 2020 04:24) (71 - 82)  BP: 116/72 (10 Aug 2020 04:24) (114/67 - 150/85)  BP(mean): --  RR: 18 (10 Aug 2020 04:24) (16 - 18)  SpO2: 95% (10 Aug 2020 04:24) (95% - 96%)    Unable to obtain, will obtain in PM.       LABS:                   8.8    10.09 )-----------( 495      ( 10 Aug 2020 08:22 )             28.0     08-10    142  |  106  |  15  ----------------------------<  103<H>  3.7   |  23  |  0.72    Ca    8.8      10 Aug 2020 08:22  Phos  3.5     08-10  Mg     1.5     08-10    TPro  6.0  /  Alb  3.1<L>  /  TBili  0.2  /  DBili  x   /  AST  18  /  ALT  12  /  AlkPhos  40  08-09            Urinalysis Basic - ( 08 Aug 2020 18:21 )    Color: Yellow / Appearance: Clear / S.015 / pH: x  Gluc: x / Ketone: Trace  / Bili: Negative / Urobili: Negative   Blood: x / Protein: Trace / Nitrite: Negative   Leuk Esterase: Moderate / RBC: 2 /hpf / WBC 5 /HPF   Sq Epi: x / Non Sq Epi: 5 /hpf / Bacteria: Negative    COVID-19 PCR: NotDetec          RADIOLOGY & ADDITIONAL TESTS:  < from: CT Chest w/ IV Cont (08.10.20 @ 08:49) >  Clear lungs.      < end of copied text >    LE venous duplex: no DVT b/l (8.10.20)    COORDINATION OF CARE:  Care Discussed with Consultants/Other Providers [Y/N]:  Prior or Outpatient Records Reviewed [Y/N]: PROGRESS NOTE:   Authored by Lainey Bryan MD, PGY1 [facility] pager [phone]    86y old  woman who presents with a chief complaint of persistent diarrhea     SUBJECTIVE / OVERNIGHT EVENTS: No acute events overnight. Patient reports improved stool consistency, non-watery, still loose. Patient denies fevers, chills. Patient endorses scant amounts of bright red blood when making a bowel movement, which she has noticed for the past 3-4 days, and attributes it to previously diagnosed rectal prolapse. Bowel movements are non-painful. She denies dizziness, lightheadedness, palpitations. Patient also endorses three small papules on left and right upper thigh and left arm, non-itchy, non-painful. Patient has had this with previous course of flagyl.     REVIEW OF SYSTEMS:  CONSTITUTIONAL: No weakness, fevers or chills  EYES: No visual changes  ENT:  No vertigo, no throat pain   RESPIRATORY: No cough, wheezing, hemoptysis; No shortness of breath  CARDIOVASCULAR: No chest pain or palpitations  GASTROINTESTINAL: No abdominal or epigastric pain. No nausea, vomiting, or hematemesis; No diarrhea or constipation. No melena or hematochezia.  GENITOURINARY: No dysuria, frequency or hematuria  NEUROLOGICAL: No numbness, No HA  SKIN: No itching, rashes  MSK: no joint pain, no muscle pain      MEDICATIONS  (STANDING):  atorvastatin 20 milliGRAM(s) Oral at bedtime  cholestyramine Powder (Sugar-Free) 4 Gram(s) Oral daily  enoxaparin Injectable 40 milliGRAM(s) SubCutaneous daily  levothyroxine 50 MICROGram(s) Oral daily  metroNIDAZOLE  IVPB 500 milliGRAM(s) IV Intermittent every 8 hours  sotalol 40 milliGRAM(s) Oral every 12 hours  vancomycin    Solution 125 milliGRAM(s) Oral every 6 hours    MEDICATIONS  (PRN):  acetaminophen   Tablet .. 650 milliGRAM(s) Oral every 6 hours PRN Temp greater or equal to 38C (100.4F), Mild Pain (1 - 3)        PHYSICAL EXAM:  Vital Signs Last 24 Hrs  T(C): 36.8 (10 Aug 2020 04:24), Max: 37 (09 Aug 2020 22:17)  T(F): 98.2 (10 Aug 2020 04:24), Max: 98.6 (09 Aug 2020 22:17)  HR: 71 (10 Aug 2020 04:24) (71 - 82)  BP: 116/72 (10 Aug 2020 04:24) (114/67 - 150/85)  BP(mean): --  RR: 18 (10 Aug 2020 04:24) (16 - 18)  SpO2: 95% (10 Aug 2020 04:24) (95% - 96%)    Unable to obtain, will obtain in PM.       LABS:                   8.8    10.09 )-----------( 495      ( 10 Aug 2020 08:22 )             28.0     08-10    142  |  106  |  15  ----------------------------<  103<H>  3.7   |  23  |  0.72    Ca    8.8      10 Aug 2020 08:22  Phos  3.5     08-10  Mg     1.5     08-10    TPro  6.0  /  Alb  3.1<L>  /  TBili  0.2  /  DBili  x   /  AST  18  /  ALT  12  /  AlkPhos  40  08-09            Urinalysis Basic - ( 08 Aug 2020 18:21 )    Color: Yellow / Appearance: Clear / S.015 / pH: x  Gluc: x / Ketone: Trace  / Bili: Negative / Urobili: Negative   Blood: x / Protein: Trace / Nitrite: Negative   Leuk Esterase: Moderate / RBC: 2 /hpf / WBC 5 /HPF   Sq Epi: x / Non Sq Epi: 5 /hpf / Bacteria: Negative    COVID-19 PCR: NotDetec          RADIOLOGY & ADDITIONAL TESTS:  < from: CT Chest w/ IV Cont (08.10.20 @ 08:49) >  Clear lungs.      < end of copied text >    LE venous duplex: no DVT b/l (8.10.20)    COORDINATION OF CARE:  Care Discussed with Consultants/Other Providers [Y/N]:  Prior or Outpatient Records Reviewed [Y/N]:

## 2020-10-09 NOTE — DIETITIAN INITIAL EVALUATION ADULT. - PROBLEM SELECTOR PROBLEM 4
Homero Reid Patient Age: 67 year old  MESSAGE: Interpreting service used: No      IM/FP- Results- Providing Results       Type of results: postive c- diff    Are results critical: NO- Stacy- Connect call to Triage queue- Route message to [facility] RN pool ([phone])     WEIGHT AND HEIGHT:   Wt Readings from Last 1 Encounters:   05/22/20 91.2 kg (201 lb)     Ht Readings from Last 1 Encounters:   05/22/20 5' 9\" (1.753 m)     BMI Readings from Last 1 Encounters:   05/22/20 29.68 kg/m²       ALLERGIES:  Penicillins  Current Outpatient Medications   Medication   • sulfamethoxazole-trimethoprim (BACTRIM DS) 800-160 MG per tablet   • omeprazole (PrilOSEC) 20 MG capsule   • losartan (COZAAR) 100 MG tablet   • Misc Natural Products (TUMERSAID PO)   • levothyroxine (SYNTHROID, LEVOTHROID) 88 MCG tablet     No current facility-administered medications for this visit.      PHARMACY to use: none          Pharmacy preference(s) on file:   [id number] - [place], [place] - [address]  [address] [address]  Phone: [phone] Fax: [phone]    [id number] - [place], [place] - [address]  [address] [address]  Phone: [phone] Fax: [phone]      CALL BACK INFO: Ok to leave response (including medical information) with family member or on answering machine  ROUTING: Patient's physician/staff        PCP: Bob Spann MD         INS: Payor: MEDICARE / Plan: PARTA AND B / Product Type: MEDICARE   PATIENT ADDRESS:  [id number] [place] [address]     SVT (supraventricular tachycardia)
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
## 2020-11-20 PROBLEM — Z93.3 COLOSTOMY IN PLACE: Status: ACTIVE | Noted: 2020-01-01

## 2020-11-20 PROBLEM — Z96.642 STATUS POST LEFT HIP REPLACEMENT: Status: RESOLVED | Noted: 2019-01-04 | Resolved: 2020-01-01

## 2020-11-20 PROBLEM — C18.9 ADENOCARCINOMA, COLON: Status: ACTIVE | Noted: 2020-01-01

## 2020-11-20 PROBLEM — Z87.19 HISTORY OF GASTRITIS: Status: RESOLVED | Noted: 2020-01-01 | Resolved: 2020-01-01

## 2020-11-20 PROBLEM — Z23 ENCOUNTER FOR IMMUNIZATION: Status: RESOLVED | Noted: 2020-01-01 | Resolved: 2020-01-01

## 2020-11-20 PROBLEM — K64.4 EXTERNAL HEMORRHOIDS: Status: RESOLVED | Noted: 2020-01-01 | Resolved: 2020-01-01

## 2020-11-20 PROBLEM — Z86.19 HISTORY OF CLOSTRIDIOIDES DIFFICILE INFECTION: Status: RESOLVED | Noted: 2020-01-01 | Resolved: 2020-01-01

## 2020-11-20 NOTE — ASSESSMENT
[FreeTextEntry1] : 1. Adenocarcinoma COlon- Dx'd Aug 2020 by flex sign when hosp for C Diff diarrhea. Underwent sigmoid resection 10/20 at [facility] and [facility] with ostomy. Returning to [place] next week from [place], although plans to return for chemo for = nodes identified at time of surgery PET CT done this week and reportedly neg for additional mets\par \par PLan- Ordered Home Care for Ostomy nurse

## 2020-11-20 NOTE — HISTORY OF PRESENT ILLNESS
[FreeTextEntry1] : 85 yo s/p recent colectomy for cancer for scheduled f/u- plans to relocate back to [place] next week and is in need of home care services [de-identified] : See notes- pt hosp last summer for severe diarrhea related to C Diff. As part of eval had flex sig- bx + adenoca- slides reviewed elsewhere and dx confirmed Her son (MD) is at [facility] in [place] and she decided to have surgery there- completed last month with ostomy. Has been recuperating locally, but planning to come to [place] next week and reached out as needs support from ostomy nurse\par \par Reports no complications of surgery or the aftermath. Feels generally doing well and coping well with support of  and children\par \par No change in chronic meds. Reporets PEt CT neg, but plans chem for local nodes +. NO issues with arrhytimia etc related to surgery etc

## 2021-01-01 ENCOUNTER — TRANSCRIPTION ENCOUNTER (OUTPATIENT)
Age: 86
End: 2021-01-01

## 2021-01-01 ENCOUNTER — NON-APPOINTMENT (OUTPATIENT)
Age: 86
End: 2021-01-01

## 2021-03-12 ENCOUNTER — NON-APPOINTMENT (OUTPATIENT)
Age: 86
End: 2021-03-12

## 2022-02-04 NOTE — PROGRESS NOTE ADULT - PROBLEM SELECTOR PROBLEM 9
February 4, 2022     Layton Juan  [address] [facility] [address]    Dear Layton Juan:  We received a referral from your primary care provider, Lorena Wilson NP  to schedule you for a colonoscopy.   · What is a Colonoscopy?  A colonoscopy is an exam of the colon (large intestine or bowel) with a slim, flexible lighted tube called a colonoscope. Your health care provider can use the colonoscope to get a clear , magnified view of the inside of your colon from the anus to the area near the appendix.   There’s no question about it – preventive testing can save lives or can help stop a disease process in its tracks. Many health problems start out silently without symptoms. Testing is often the only way to catch these problems in early stages, when they can be more successfully treated.Colorectal cancer usually comes from polyps (abnormal growths) in the colon or rectum. A Colonoscopy can find the polyps and remove them before they turn to cancer.     These procedures are done on Monday,Tuesday, Wednesday, Thursday and Friday mornings and you will need a responsible adult to drive you home after the procedure.    At this time: pre-procedure Covid testing is required.  After Covid testing, we encourage you to self-quarantine to minimize risk of exposure, but if you can't, we recommend you wear a mask, social distance and practice good hand-washing hygiene prior to your procedure. Your pre-procedure covid test will be scheduled at the time of your procedure. Testing is available at the [facility] in [facility] and [place].        Please contact our office at [phone] to schedule this outpatient procedure.  If you would prefer not to schedule at this time, please notify our office so we may remove you from our call list.  If you have chosen to complete your procedure outside of the [facility] system, please contact our office so we can update your health records.     We look  forward to hear from you soon.     Amari Poon MD, [facility], [facility]  General/Olla-Rectal Surgery Dept  [address] [place] [address] Dr. Annelise WHEELER  [id number]  Phone:  [phone]   Hypothyroid Prophylactic measure

## 2022-02-08 NOTE — PROGRESS NOTE ADULT - PROBLEM SELECTOR PLAN 4
Unlikely that this presentation represents UC flare as it is quite different from patient's previous flare - no abdominal pain, minimal bloody BM  -Outpatient follow-up k laurita/Nurse Unlikely that this presentation represents UC flare as it is quite different from patient's previous flare - no abdominal pain, minimal bloody BM  -Outpatient follow-up to determine further treatment for UC. Nurse

## 2022-04-12 NOTE — PROGRESS NOTE ADULT - PROBLEM SELECTOR PLAN 6
-C/w crestor -Hold antihypertensives for now. Posterior Auricular Interpolation Flap Text: A decision was made to reconstruct the defect utilizing an interpolation axial flap and a staged reconstruction.  A telfa template was made of the defect.  This telfa template was then used to outline the posterior auricular interpolation flap.  The donor area for the pedicle flap was then injected with anesthesia.  The flap was excised through the skin and subcutaneous tissue down to the layer of the underlying musculature.  The pedicle flap was carefully excised within this deep plane to maintain its blood supply.  The edges of the donor site were undermined.   The donor site was closed in a primary fashion.  The pedicle was then rotated into position and sutured.  Once the tube was sutured into place, adequate blood supply was confirmed with blanching and refill.  The pedicle was then wrapped with xeroform gauze and dressed appropriately with a telfa and gauze bandage to ensure continued blood supply and protect the attached pedicle.

## 2022-10-11 NOTE — PROGRESS NOTE ADULT - ATTENDING COMMENTS
Patient seen and examined. Plan as discussed w/ Dr. Bryan: Cdiff/diarrhea appears to be improving, will discuss w/ ID plan for continued tx: PO Vancomycin +/- IV Flagyl, and duration as this is day 9 of treatment. Will discuss w/ patient/family and PMD, Dr. Quinonez, approach for continued management of pathologic diagnosis of colon adenoCa per biopsy taken during recent sigmoidoscopy. F/u GI and colorectal surgery. Continue dispo planning based on determined plan for further oncologic workup at this time; likely will continue as O/P w/ close f/u. Per nutrition, patient w/ mild protein calorie malnutrition. Orbicularis Oris Muscle Flap Text: The defect edges were debeveled with a #15 scalpel blade.  Given that the defect affected the competency of the oral sphincter an orbicularis oris muscle flap was deemed most appropriate to restore this competency and normal muscle function.  Using a sterile surgical marker, an appropriate flap was drawn incorporating the defect. The area thus outlined was incised with a #15 scalpel blade.

## 2022-11-14 NOTE — PROGRESS NOTE ADULT - ASSESSMENT
86F PMH ulcerative colitis (on asacol 1600 TID), external hemorrhoids, SVT on sotolol, hypothyroid, HTN, HLD, admitted 5/15 - 5/20/20 for UC flare treated with IV steroids and d/c on prednisone, now presenting c diff colitis vs UC flare.       Impression:  #C diff colitis - severe disease, WBC 30k, fever, pancolitis on imaging; normal creatinine.   # Ulcerative colitis - recently on steroids for flare in May, now on PO mesalamine.    #SVT on sotalol        Recommendation:  - c/w PO vanc and IV flagyl given degree of leukocytosis and degree of pancolitis on imaging  - if any decompensation, hypotension, worsening leukocytosis or creatinine, rising lactate - will need surgery consultation  - monitor BM frequency and consistency  - check hepBs Ag, hep B sAb, quant gold pending, in case of need for biologic therapy  - recommend ID consult, patient inquiring regarding fecal transplant  - if no improvement in next 24-48hrs will move forward with flex sig to assess severity of UC contributing to symptoms      Cheryl Stanley PGY-4  Gastroenterology Fellow  Pager #[phone] or [phone]  Please call [facility] [employer] service for on-call GI fellow ([phone]) after 5pm and before 8am, and on weekends. 4

## 2025-06-16 NOTE — ED ADULT NURSE NOTE - OBJECTIVE STATEMENT
You recently visited our office for care. Thank you for entrusting our team with your care. Following your appointment, you may receive a survey. We’d sincerely appreciate you taking the time to complete and return the survey, should you receive one. We value your input and closely monitor our survey results to insure we deliver the best care you, and all our patients, expect and deserve. Again, thank you for choosing our team for your care.    If we need to contact you regarding any test results, we will make 2 attempts to reach you at the number you have listed during your office visit today.  If we are unable to reach you, a letter with your results and any further instructions will be mailed to you home.    Please do not hesitate to call our office with any questions or concerns at Dept: [phone].   85y F PMHx SVT, colitis, HLD, HTN, & hypothyroidism presents to ED with bloody diarrhea x2 weeks & Lt-sided sharp abdominal pain that started this AM. A&Ox4. States that she had episode nausea & non-bloody vomiting x1 today. Denies CP, SOB, H/A, f/c, dizziness, palpitations, cough, & urinary symptoms. Lungs CTA & no respiratory distress noted at this time on RA. Cardiac monitor applied for intermittent episodes of bracycardia to the 50s. Abdomen soft & nondistened. Lt side of abdomen tender to palpation & pt states that pain radiates across abdomen to Rt side. Skin warm, dry, & intact. MAEx4. +1 BLE edema noted on exam. Pt resting comfortably with no complaints at this time. Pt's bed in the lowest position & explained POC to pt. Will continue to reassess. 85y F PMHx SVT, colitis, HLD, HTN, & hypothyroidism presents to ED with bloody, liquid diarrhea x2 weeks & Lt-sided sharp abdominal pain that started this AM. A&Ox4. Reports that pain was 9/10 this AM but has decreased to 5/10 at this time. States that she had episode nausea & non-bloody vomiting x1 today. Denies CP, SOB, H/A, f/c, dizziness, palpitations, cough, & urinary symptoms. Lungs CTA & no respiratory distress noted at this time on RA. Cardiac monitor applied for intermittent episodes of bracycardia to the 50s. Abdomen soft & nondistened. Lt side of abdomen tender to palpation & pt states that pain radiates across abdomen to Rt side. Skin warm, dry, & intact. MAEx4. +1 BLE edema noted on exam. Pt resting comfortably with no complaints at this time. Pt's bed in the lowest position & explained POC to pt. Will continue to reassess.